# Patient Record
Sex: FEMALE | Race: WHITE | Employment: OTHER | URBAN - METROPOLITAN AREA
[De-identification: names, ages, dates, MRNs, and addresses within clinical notes are randomized per-mention and may not be internally consistent; named-entity substitution may affect disease eponyms.]

---

## 2017-03-22 ENCOUNTER — OP HISTORICAL/CONVERTED ENCOUNTER (OUTPATIENT)
Dept: OTHER | Age: 67
End: 2017-03-22

## 2017-05-05 ENCOUNTER — OP HISTORICAL/CONVERTED ENCOUNTER (OUTPATIENT)
Dept: OTHER | Age: 67
End: 2017-05-05

## 2017-10-20 ENCOUNTER — OP HISTORICAL/CONVERTED ENCOUNTER (OUTPATIENT)
Dept: OTHER | Age: 67
End: 2017-10-20

## 2017-12-28 ENCOUNTER — OP HISTORICAL/CONVERTED ENCOUNTER (OUTPATIENT)
Dept: OTHER | Age: 67
End: 2017-12-28

## 2018-01-05 ENCOUNTER — OP HISTORICAL/CONVERTED ENCOUNTER (OUTPATIENT)
Dept: OTHER | Age: 68
End: 2018-01-05

## 2018-01-18 ENCOUNTER — OP HISTORICAL/CONVERTED ENCOUNTER (OUTPATIENT)
Dept: OTHER | Age: 68
End: 2018-01-18

## 2018-01-22 ENCOUNTER — OP HISTORICAL/CONVERTED ENCOUNTER (OUTPATIENT)
Dept: OTHER | Age: 68
End: 2018-01-22

## 2018-05-10 ENCOUNTER — OP HISTORICAL/CONVERTED ENCOUNTER (OUTPATIENT)
Dept: OTHER | Age: 68
End: 2018-05-10

## 2018-05-21 ENCOUNTER — OP HISTORICAL/CONVERTED ENCOUNTER (OUTPATIENT)
Dept: OTHER | Age: 68
End: 2018-05-21

## 2018-06-21 ENCOUNTER — OP HISTORICAL/CONVERTED ENCOUNTER (OUTPATIENT)
Dept: OTHER | Age: 68
End: 2018-06-21

## 2018-06-21 LAB — AMB DEXA, EXTERNAL: NORMAL

## 2018-11-12 ENCOUNTER — OP HISTORICAL/CONVERTED ENCOUNTER (OUTPATIENT)
Dept: OTHER | Age: 68
End: 2018-11-12

## 2019-03-28 ENCOUNTER — OP HISTORICAL/CONVERTED ENCOUNTER (OUTPATIENT)
Dept: OTHER | Age: 69
End: 2019-03-28

## 2019-05-13 ENCOUNTER — OP HISTORICAL/CONVERTED ENCOUNTER (OUTPATIENT)
Dept: OTHER | Age: 69
End: 2019-05-13

## 2019-05-21 LAB
A/G RATIO, EXTERNAL: 2
ALBUMIN, EXTERNAL: 4.3
ALK PHOS, EXTERNAL: 106
ANION GAP BLD CALC-SCNC: NORMAL MMOL/L
BILI DIRECT, EXTERNAL: NORMAL
BILI TOTAL, EXTERNAL: 0.3
BUN BLD-MCNC: 9 MG/DL
BUN/CREATININE RATIO, EXTERNAL: NORMAL
CALCIUM, EXTERNAL: 11.1
CALCIUM, ION, EXTERNAL: NORMAL
CHLORIDE, EXTERNAL: 99
CO2, EXTERNAL: 26
CREATININE SER, EXTERNAL: 1.06
EGFR IF AFA, EXTERNAL: 61
EGFR NOT AFA, EXTERNAL: 53
GLOBULIN, EXTERNAL: 2.2
GLUCOSE SER, EXTERNAL: 103
HBA1C MFR BLD HPLC: 6.1 %
POTASSIUM, EXTERNAL: 4.8
PROTEIN TOT, EXTERNAL: 6.5
SGOT (AST), EXTERNAL: 15
SGPT (ALT), EXTERNAL: 14
SODIUM, EXTERNAL: 141

## 2019-06-12 ENCOUNTER — OP HISTORICAL/CONVERTED ENCOUNTER (OUTPATIENT)
Dept: OTHER | Age: 69
End: 2019-06-12

## 2020-02-28 ENCOUNTER — OP HISTORICAL/CONVERTED ENCOUNTER (OUTPATIENT)
Dept: OTHER | Age: 70
End: 2020-02-28

## 2020-05-26 ENCOUNTER — OP HISTORICAL/CONVERTED ENCOUNTER (OUTPATIENT)
Dept: OTHER | Age: 70
End: 2020-05-26

## 2020-05-27 ENCOUNTER — OP HISTORICAL/CONVERTED ENCOUNTER (OUTPATIENT)
Dept: OTHER | Age: 70
End: 2020-05-27

## 2020-05-27 LAB — MAMMOGRAPHY, EXTERNAL: NORMAL

## 2020-06-23 ENCOUNTER — HOSPITAL ENCOUNTER (OUTPATIENT)
Dept: PREADMISSION TESTING | Age: 70
Discharge: HOME | End: 2020-06-23
Payer: MEDICARE

## 2020-06-23 VITALS
RESPIRATION RATE: 18 BRPM | TEMPERATURE: 98.2 F | DIASTOLIC BLOOD PRESSURE: 57 MMHG | BODY MASS INDEX: 38.95 KG/M2 | WEIGHT: 228.13 LBS | SYSTOLIC BLOOD PRESSURE: 130 MMHG | HEIGHT: 64 IN

## 2020-06-23 LAB
ABO + RH BLD: NORMAL
ANION GAP SERPL CALC-SCNC: 6 MMOL/L (ref 5–15)
APPEARANCE UR: CLEAR
ATRIAL RATE: 80 BPM
BACTERIA URNS QL MICRO: NEGATIVE /HPF
BILIRUB UR QL: NEGATIVE
BLOOD GROUP ANTIBODIES SERPL: NORMAL
BUN SERPL-MCNC: 15 MG/DL (ref 6–20)
BUN/CREAT SERPL: 13 (ref 12–20)
CALCIUM SERPL-MCNC: 10.2 MG/DL (ref 8.5–10.1)
CALCULATED P AXIS, ECG09: 49 DEGREES
CALCULATED R AXIS, ECG10: -5 DEGREES
CALCULATED T AXIS, ECG11: 32 DEGREES
CHLORIDE SERPL-SCNC: 101 MMOL/L (ref 97–108)
CO2 SERPL-SCNC: 28 MMOL/L (ref 21–32)
COLOR UR: NORMAL
CREAT SERPL-MCNC: 1.15 MG/DL (ref 0.55–1.02)
DIAGNOSIS, 93000: NORMAL
EPITH CASTS URNS QL MICRO: NORMAL /LPF
ERYTHROCYTE [DISTWIDTH] IN BLOOD BY AUTOMATED COUNT: 14.2 % (ref 11.5–14.5)
EST. AVERAGE GLUCOSE BLD GHB EST-MCNC: 134 MG/DL
GLUCOSE SERPL-MCNC: 92 MG/DL (ref 65–100)
GLUCOSE UR STRIP.AUTO-MCNC: NEGATIVE MG/DL
HBA1C MFR BLD: 6.3 % (ref 4–5.6)
HCT VFR BLD AUTO: 40.1 % (ref 35–47)
HGB BLD-MCNC: 12.9 G/DL (ref 11.5–16)
HGB UR QL STRIP: NEGATIVE
HYALINE CASTS URNS QL MICRO: NORMAL /LPF (ref 0–5)
INR PPP: 1 (ref 0.9–1.1)
KETONES UR QL STRIP.AUTO: NEGATIVE MG/DL
LEUKOCYTE ESTERASE UR QL STRIP.AUTO: NEGATIVE
MCH RBC QN AUTO: 28.8 PG (ref 26–34)
MCHC RBC AUTO-ENTMCNC: 32.2 G/DL (ref 30–36.5)
MCV RBC AUTO: 89.5 FL (ref 80–99)
NITRITE UR QL STRIP.AUTO: NEGATIVE
NRBC # BLD: 0 K/UL (ref 0–0.01)
NRBC BLD-RTO: 0 PER 100 WBC
P-R INTERVAL, ECG05: 162 MS
PH UR STRIP: 6 [PH] (ref 5–8)
PLATELET # BLD AUTO: 382 K/UL (ref 150–400)
PMV BLD AUTO: 10.3 FL (ref 8.9–12.9)
POTASSIUM SERPL-SCNC: 5.1 MMOL/L (ref 3.5–5.1)
PROT UR STRIP-MCNC: NEGATIVE MG/DL
PROTHROMBIN TIME: 10.6 SEC (ref 9–11.1)
Q-T INTERVAL, ECG07: 372 MS
QRS DURATION, ECG06: 82 MS
QTC CALCULATION (BEZET), ECG08: 429 MS
RBC # BLD AUTO: 4.48 M/UL (ref 3.8–5.2)
RBC #/AREA URNS HPF: NORMAL /HPF (ref 0–5)
SODIUM SERPL-SCNC: 135 MMOL/L (ref 136–145)
SP GR UR REFRACTOMETRY: 1 (ref 1–1.03)
SPECIMEN EXP DATE BLD: NORMAL
UA: UC IF INDICATED,UAUC: NORMAL
UROBILINOGEN UR QL STRIP.AUTO: 0.2 EU/DL (ref 0.2–1)
VENTRICULAR RATE, ECG03: 80 BPM
WBC # BLD AUTO: 10 K/UL (ref 3.6–11)
WBC URNS QL MICRO: NORMAL /HPF (ref 0–4)

## 2020-06-23 PROCEDURE — 36415 COLL VENOUS BLD VENIPUNCTURE: CPT

## 2020-06-23 PROCEDURE — 85027 COMPLETE CBC AUTOMATED: CPT

## 2020-06-23 PROCEDURE — 83036 HEMOGLOBIN GLYCOSYLATED A1C: CPT

## 2020-06-23 PROCEDURE — 86900 BLOOD TYPING SEROLOGIC ABO: CPT

## 2020-06-23 PROCEDURE — 80048 BASIC METABOLIC PNL TOTAL CA: CPT

## 2020-06-23 PROCEDURE — 81001 URINALYSIS AUTO W/SCOPE: CPT

## 2020-06-23 PROCEDURE — 85610 PROTHROMBIN TIME: CPT

## 2020-06-23 PROCEDURE — 93005 ELECTROCARDIOGRAM TRACING: CPT

## 2020-06-23 RX ORDER — TRAMADOL HYDROCHLORIDE 50 MG/1
50 TABLET ORAL
COMMUNITY
End: 2020-07-07

## 2020-06-23 RX ORDER — ZINC GLUCONATE 10 MG
250 LOZENGE ORAL
COMMUNITY
End: 2021-05-20

## 2020-06-23 RX ORDER — LEVOTHYROXINE SODIUM 100 UG/1
100 TABLET ORAL
COMMUNITY
End: 2021-05-28 | Stop reason: SDUPTHER

## 2020-06-23 RX ORDER — UMECLIDINIUM 62.5 UG/1
1 AEROSOL, POWDER ORAL DAILY
COMMUNITY
End: 2021-05-20 | Stop reason: ALTCHOICE

## 2020-06-23 RX ORDER — SITAGLIPTIN AND METFORMIN HYDROCHLORIDE 500; 50 MG/1; MG/1
1 TABLET, FILM COATED ORAL 2 TIMES DAILY WITH MEALS
COMMUNITY
End: 2020-09-24 | Stop reason: SDUPTHER

## 2020-06-23 RX ORDER — TRAZODONE HYDROCHLORIDE 100 MG/1
100 TABLET ORAL
COMMUNITY
End: 2021-04-23 | Stop reason: SDUPTHER

## 2020-06-23 RX ORDER — BUDESONIDE AND FORMOTEROL FUMARATE DIHYDRATE 160; 4.5 UG/1; UG/1
2 AEROSOL RESPIRATORY (INHALATION) 2 TIMES DAILY
COMMUNITY
End: 2021-05-20 | Stop reason: ALTCHOICE

## 2020-06-23 RX ORDER — ROSUVASTATIN CALCIUM 20 MG/1
20 TABLET, COATED ORAL
COMMUNITY
End: 2021-05-28 | Stop reason: SDUPTHER

## 2020-06-23 RX ORDER — CHOLECALCIFEROL TAB 125 MCG (5000 UNIT) 125 MCG
5000 TAB ORAL DAILY
COMMUNITY

## 2020-06-23 RX ORDER — ANASTROZOLE 1 MG/1
1 TABLET ORAL DAILY
COMMUNITY

## 2020-06-23 RX ORDER — VITAMIN E 268 MG
400 CAPSULE ORAL DAILY
COMMUNITY

## 2020-06-23 RX ORDER — PANTOPRAZOLE SODIUM 40 MG/1
40 TABLET, DELAYED RELEASE ORAL DAILY
COMMUNITY
End: 2020-08-21 | Stop reason: SDUPTHER

## 2020-06-23 RX ORDER — OXYCODONE AND ACETAMINOPHEN 5; 325 MG/1; MG/1
1 TABLET ORAL
COMMUNITY
End: 2020-07-07

## 2020-06-23 RX ORDER — MELOXICAM 15 MG/1
15 TABLET ORAL DAILY
COMMUNITY
End: 2020-08-21 | Stop reason: SDUPTHER

## 2020-06-23 RX ORDER — FUROSEMIDE 20 MG/1
20 TABLET ORAL DAILY
COMMUNITY
End: 2021-01-08 | Stop reason: SDUPTHER

## 2020-06-23 RX ORDER — VENLAFAXINE 75 MG/1
75 TABLET ORAL DAILY
COMMUNITY
End: 2022-05-18 | Stop reason: ALTCHOICE

## 2020-06-23 RX ORDER — ASPIRIN 81 MG/1
81 TABLET ORAL DAILY
COMMUNITY

## 2020-06-23 NOTE — PERIOP NOTES
PRE-OPERATIVE INSTRUCTIONS REVIEWED WITH PATIENT. GIVEN TIME TO ASK QUESTIONS     PATIENT GIVEN 2-BOTTLE OF CHG SOAP. REVIEWED   PATIENT GIVEN SSI INFECTION FAQ SHEET.   MRSA / MSSA TREATMENT INSTRUCTION SHEET        INSTRUCTIONS GIVEN AND REVIEWED ON NEW ADMISSION PROCESS AND COVID

## 2020-06-24 LAB
BACTERIA SPEC CULT: NORMAL
BACTERIA SPEC CULT: NORMAL
SERVICE CMNT-IMP: NORMAL

## 2020-06-24 NOTE — PERIOP NOTES
12/24/20 @ 11:40 am - Called Dr. Catia White (PCP office [phone]) and spoke with Raffy Figueroa. Raffy Figueroa provided fax #: [phone] to fax patient's most recent labs & EKG.  Fax complete at 11:44 am.

## 2020-07-01 PROBLEM — M75.122 NONTRAUMATIC COMPLETE TEAR OF LEFT ROTATOR CUFF: Status: ACTIVE | Noted: 2020-07-01

## 2020-07-01 NOTE — H&P
Date of Surgery Update:  Gail Montesinos was seen and examined. Past Medical History:   Diagnosis Date    Arthritis     Asthma     Cancer ([facility] [address]     BREAST CANCER RIGHT BREAST LUMPECTOMY     Chronic pain     COPD (chronic obstructive pulmonary disease) (HCC)     Diabetes (HCC)     GERD (gastroesophageal reflux disease)     Sleep apnea     Thyroid disease 1980    REMOVED      Prior to Admission Medications   Prescriptions Last Dose Informant Patient Reported? Taking? Lactobacillus rhamnosus GG (CULTURELLE PO)   Yes No   Sig: Take 1 Tab by mouth daily. SITagliptin-metFORMIN (Janumet)  mg per tablet   Yes No   Sig: Take 1 Tab by mouth two (2) times daily (with meals). anastrozole (ARIMIDEX) 1 mg tablet 7/6/2020 at Unknown time  Yes Yes   Sig: Take 1 mg by mouth daily. aspirin delayed-release 81 mg tablet 6/30/2020  Yes No   Sig: Take 81 mg by mouth daily. budesonide-formoteroL (Symbicort) 160-4.5 mcg/actuation HFAA 7/6/2020 at Unknown time  Yes Yes   Sig: Take 2 Puffs by inhalation two (2) times a day. cholecalciferol (Vitamin D3) (5000 Units/125 mcg) tab tablet 6/7/2020 at Unknown time  Yes Yes   Sig: Take 5,000 Units by mouth daily. furosemide (LASIX) 20 mg tablet 7/6/2020 at Unknown time  Yes Yes   Sig: Take 20 mg by mouth daily. levothyroxine (SYNTHROID) 100 mcg tablet 7/6/2020 at Unknown time  Yes Yes   Sig: Take 100 mcg by mouth Daily (before breakfast). magnesium 250 mg tab 6/30/2020  Yes No   Sig: Take 250 mg by mouth.   meloxicam (MOBIC) 15 mg tablet 6/30/2020  Yes No   Sig: Take 15 mg by mouth daily. omega-3 fatty acids/dha/epa (MEGARED PLANT-OMEGA-3 PO) 6/30/2020  Yes No   Sig: Take 800 mg by mouth daily. oxyCODONE-acetaminophen (PERCOCET) 5-325 mg per tablet 6/30/2020 at Unknown time  Yes Yes   Sig: Take 1 Tab by mouth every four (4) hours as needed for Pain.   pantoprazole (PROTONIX) 40 mg tablet 7/6/2020 at Unknown time  Yes Yes   Sig: Take 40 mg by mouth daily. rosuvastatin (CRESTOR) 20 mg tablet 7/6/2020 at Unknown time  Yes Yes   Sig: Take 20 mg by mouth nightly. traMADoL (ULTRAM) 50 mg tablet 7/6/2020 at Unknown time  Yes Yes   Sig: Take 50 mg by mouth every six (6) hours as needed for Pain. traZODone (DESYREL) 100 mg tablet 6/30/2020 at Unknown time  Yes Yes   Sig: Take 100 mg by mouth nightly. umeclidinium (Incruse Ellipta) 62.5 mcg/actuation inhaler 7/6/2020 at Unknown time  Yes Yes   Sig: Take 1 Puff by inhalation daily. venlafaxine (EFFEXOR) 75 mg tablet Unknown at Unknown time  Yes No   Sig: Take 75 mg by mouth daily. vitamin E (AQUA GEMS) 400 unit capsule 6/7/2020 at Unknown time  Yes Yes   Sig: Take 400 Units by mouth daily. Facility-Administered Medications: None      Allergy to: Other medication  Physical Examination: General appearance - alert, well appearing, and in no distress  Chest - clear to auscultation, no wheezes, rales or rhonchi, symmetric air entry  Heart - normal rate and regular rhythm  Abdomen - soft, nontender, nondistended, no masses or organomegaly  History and physical has been reviewed. The patient has been examined. There have been no significant clinical changes since the completion of the originally dated History and Physical.    Signed By: Katherine Howard PA-C     July 7, 2020 9:01 AM             PCP: Brian Lott, DO  There is no problem list on file for this patient. Subjective:   Chief Complaint: Pain of the Left Shoulder and Pain of the Right Shoulder    HPI: Chacha Chan is a 68 y.o. female who presents today for evaluation and treatment of her left shoulder pain. She reports lateral left shoulder pain that radiates into her left elbow. She reports a history of metastatic disease. She notes she is currently taking medication for her endocrine function which dramatically increased her shoulder pain. She notes the pain now radiates across her neck and into her right shoulder.  She reports that the pain keeps her awake at night. She says she has tried Tramadol with no significant relief and oxycodone when her pain is severe with relief. She notes she had an MRI ordered which showed osteoarthritis of her left shoulder. She denies the MRI showed a full-thickness rotator cuff tear. She denies trying steroid injections to her shoulder in the past.      Objective:      There were no vitals filed for this visit. There is no height or weight on file to calculate BMI.     Constitutional:  No acute distress. Well nourished. Well developed. Eyes:  Sclera are nonicteric. Respiratory:  No labored breathing. Cardiovascular:  No marked edema. Skin:  No marked skin ulcers. Neurological:  No marked sensory loss noted. Psychiatric: Alert and oriented x3.     Musculoskeletal : Left Shoulder: She has good range of motion of the neck. She has good passive ROM with significant pain on motion particularly past 90 degrees with forward flexion and abduction. She has good active ROM with significant pain on motion particularly past 90 degrees with forward flexion and abduction. She has no weakness with rotator cuff strength testing. She has good internal and external rotation. She has a positive impingement sign. She has a negative Spurling sign. Skin healthy and in tact. Neurovascularly in tact.          Radiographs:     Order: XR SHOULDER 2+ VW LEFT - Indication: Rotator cuff impingement   syndrome of left shoulder        X-ray Shoulder Left 2+ Views     Result Date: 6/5/2020  AP, Outlet, Gras[place].      Impression: 3-view x-rays of the left shoulder shows no significant degenerative changes of the glenohumeral joint. No high riding humeral head observed. Assessment:      1. Nontraumatic complete tear of left rotator cuff    2. Rotator cuff impingement syndrome of left shoulder    3. Primary osteoarthritis of left shoulder      Plan:      I reviewed the MRI ordered of the left shoulder with the patient.  She has moderate glenohumeral joint osteoarthritis and a 50-75% partial thickness tear of the supraspinatus. I reviewed x-rays ordered of the left shoulder with the patient today. She has no significant degenerative changes of the glenohumeral joint. I discussed the diagnosis of a near full-thickness rotator cuff tear with the patient as well as treatment options. We discussed rotator cuff repair surgery vs continued conservative management as well as the pros and cons of each. I explained to the patient there is a high likelihood her tear will progress to a full-thickness rotator cuff tear. While there is not absolute indication for surgery at this point in time, I recommend she proceed with surgery based on this. We discussed rotator cuff repair surgery at length including post-op recovery and expected outcomes. We also discussed risks and complications of the procedure including failure of the repair, DVT, PE, and infection. After a lengthy discussion, the patient wishes to proceed with surgery. We will schedule surgery at her convenience. Follow-up for scheduled surgery.         Orders Placed This Encounter    X-ray shoulder left 2+ views      Return for Scheduled surgery.      Medical documentation was entered by Woo santana, Medical Scribe for Dr. Harrison Form. 6/5/2020  10:41 AM  IRoderick MD, personally, performed the services described in this documentation, as scribed in my presence, and is accurate and complete.

## 2020-07-03 ENCOUNTER — HOSPITAL ENCOUNTER (OUTPATIENT)
Dept: PREADMISSION TESTING | Age: 70
Discharge: HOME | End: 2020-07-03
Payer: MEDICARE

## 2020-07-03 DIAGNOSIS — Z20.822 ENCOUNTER FOR LABORATORY TESTING FOR COVID-19 VIRUS: ICD-10-CM

## 2020-07-03 PROCEDURE — 87635 SARS-COV-2 COVID-19 AMP PRB: CPT

## 2020-07-04 LAB — SARS-COV-2, COV2NT: NOT DETECTED

## 2020-07-06 ENCOUNTER — ANESTHESIA EVENT (OUTPATIENT)
Dept: MEDSURG UNIT | Age: 70
End: 2020-07-06
Payer: MEDICARE

## 2020-07-07 ENCOUNTER — ANESTHESIA (OUTPATIENT)
Dept: MEDSURG UNIT | Age: 70
End: 2020-07-07
Payer: MEDICARE

## 2020-07-07 ENCOUNTER — HOSPITAL ENCOUNTER (OUTPATIENT)
Age: 70
Setting detail: OUTPATIENT SURGERY
Discharge: HOME | End: 2020-07-07
Attending: ORTHOPAEDIC SURGERY | Admitting: ORTHOPAEDIC SURGERY
Payer: MEDICARE

## 2020-07-07 VITALS
WEIGHT: 227.07 LBS | TEMPERATURE: 98.4 F | HEART RATE: 93 BPM | OXYGEN SATURATION: 98 % | RESPIRATION RATE: 19 BRPM | DIASTOLIC BLOOD PRESSURE: 72 MMHG | BODY MASS INDEX: 38.98 KG/M2 | SYSTOLIC BLOOD PRESSURE: 124 MMHG

## 2020-07-07 DIAGNOSIS — M75.122 NONTRAUMATIC COMPLETE TEAR OF LEFT ROTATOR CUFF: Primary | ICD-10-CM

## 2020-07-07 LAB
GLUCOSE BLD STRIP.AUTO-MCNC: 131 MG/DL (ref 65–100)
GLUCOSE BLD STRIP.AUTO-MCNC: 152 MG/DL (ref 65–100)
SERVICE CMNT-IMP: ABNORMAL
SERVICE CMNT-IMP: ABNORMAL

## 2020-07-07 PROCEDURE — 77030002922 HC SUT FBRWRE ARTH -B: Performed by: ORTHOPAEDIC SURGERY

## 2020-07-07 PROCEDURE — C1713 ANCHOR/SCREW BN/BN,TIS/BN: HCPCS | Performed by: ORTHOPAEDIC SURGERY

## 2020-07-07 PROCEDURE — 77030019908 HC STETH ESOPH SIMS -A: Performed by: ANESTHESIOLOGY

## 2020-07-07 PROCEDURE — 74011250636 HC RX REV CODE- 250/636: Performed by: NURSE ANESTHETIST, CERTIFIED REGISTERED

## 2020-07-07 PROCEDURE — 82962 GLUCOSE BLOOD TEST: CPT

## 2020-07-07 PROCEDURE — 74011250636 HC RX REV CODE- 250/636: Performed by: PHYSICIAN ASSISTANT

## 2020-07-07 PROCEDURE — 77030002919 HC SUT FBRTAPE ARTH -B: Performed by: ORTHOPAEDIC SURGERY

## 2020-07-07 PROCEDURE — 77030031139 HC SUT VCRL2 J&J -A: Performed by: ORTHOPAEDIC SURGERY

## 2020-07-07 PROCEDURE — 77030026438 HC STYL ET INTUB CARD -A: Performed by: ANESTHESIOLOGY

## 2020-07-07 PROCEDURE — 77030018835 HC SOL IRR LR ICUM -A: Performed by: ORTHOPAEDIC SURGERY

## 2020-07-07 PROCEDURE — 76210000036 HC AMBSU PH I REC 1.5 TO 2 HR: Performed by: ORTHOPAEDIC SURGERY

## 2020-07-07 PROCEDURE — 77030016678 HC BUR SHV4 S&N -B: Performed by: ORTHOPAEDIC SURGERY

## 2020-07-07 PROCEDURE — 76030000003 HC AMB SURG OR TIME 1.5 TO 2: Performed by: ORTHOPAEDIC SURGERY

## 2020-07-07 PROCEDURE — 74011250636 HC RX REV CODE- 250/636: Performed by: ORTHOPAEDIC SURGERY

## 2020-07-07 PROCEDURE — 77030006884 HC BLD SHV INCIS S&N -B: Performed by: ORTHOPAEDIC SURGERY

## 2020-07-07 PROCEDURE — 77030010507 HC ADH SKN DERMBND J&J -B: Performed by: ORTHOPAEDIC SURGERY

## 2020-07-07 PROCEDURE — 74011250636 HC RX REV CODE- 250/636: Performed by: ANESTHESIOLOGY

## 2020-07-07 PROCEDURE — 77030002916 HC SUT ETHLN J&J -A: Performed by: ORTHOPAEDIC SURGERY

## 2020-07-07 PROCEDURE — 76060000063 HC AMB SURG ANES 1.5 TO 2 HR: Performed by: ORTHOPAEDIC SURGERY

## 2020-07-07 PROCEDURE — 77030002933 HC SUT MCRYL J&J -A: Performed by: ORTHOPAEDIC SURGERY

## 2020-07-07 PROCEDURE — 74011250637 HC RX REV CODE- 250/637: Performed by: NURSE ANESTHETIST, CERTIFIED REGISTERED

## 2020-07-07 PROCEDURE — 77030020023 HC PRB ARTH ABLAT S&N -C: Performed by: ORTHOPAEDIC SURGERY

## 2020-07-07 PROCEDURE — 77030040361 HC SLV COMPR DVT MDII -B: Performed by: ORTHOPAEDIC SURGERY

## 2020-07-07 PROCEDURE — 77030008753 HC TU IRR IN/OUT FLO S&N -B: Performed by: ORTHOPAEDIC SURGERY

## 2020-07-07 PROCEDURE — 77030008684 HC TU ET CUF COVD -B: Performed by: ANESTHESIOLOGY

## 2020-07-07 PROCEDURE — 74011000250 HC RX REV CODE- 250: Performed by: NURSE ANESTHETIST, CERTIFIED REGISTERED

## 2020-07-07 DEVICE — BIO-COMP SWVLK C, CLD 4.75X19.1MM
Type: IMPLANTABLE DEVICE | Site: SHOULDER | Status: FUNCTIONAL
Brand: ARTHREX®

## 2020-07-07 RX ORDER — MIDAZOLAM HYDROCHLORIDE 1 MG/ML
1 INJECTION, SOLUTION INTRAMUSCULAR; INTRAVENOUS AS NEEDED
Status: DISCONTINUED | OUTPATIENT
Start: 2020-07-07 | End: 2020-07-07 | Stop reason: HOSPADM

## 2020-07-07 RX ORDER — SODIUM CHLORIDE, SODIUM LACTATE, POTASSIUM CHLORIDE, CALCIUM CHLORIDE 600; 310; 30; 20 MG/100ML; MG/100ML; MG/100ML; MG/100ML
100 INJECTION, SOLUTION INTRAVENOUS CONTINUOUS
Status: DISCONTINUED | OUTPATIENT
Start: 2020-07-07 | End: 2020-07-07 | Stop reason: HOSPADM

## 2020-07-07 RX ORDER — SODIUM CHLORIDE 0.9 % (FLUSH) 0.9 %
5-40 SYRINGE (ML) INJECTION AS NEEDED
Status: DISCONTINUED | OUTPATIENT
Start: 2020-07-07 | End: 2020-07-07 | Stop reason: HOSPADM

## 2020-07-07 RX ORDER — CEFAZOLIN SODIUM/WATER 2 G/20 ML
2 SYRINGE (ML) INTRAVENOUS ONCE
Status: COMPLETED | OUTPATIENT
Start: 2020-07-07 | End: 2020-07-07

## 2020-07-07 RX ORDER — SODIUM CHLORIDE 0.9 % (FLUSH) 0.9 %
5-40 SYRINGE (ML) INJECTION EVERY 8 HOURS
Status: DISCONTINUED | OUTPATIENT
Start: 2020-07-07 | End: 2020-07-07 | Stop reason: HOSPADM

## 2020-07-07 RX ORDER — OXYCODONE HYDROCHLORIDE 5 MG/1
5 TABLET ORAL
Qty: 40 TAB | Refills: 0 | Status: SHIPPED | OUTPATIENT
Start: 2020-07-07 | End: 2020-07-14

## 2020-07-07 RX ORDER — DEXMEDETOMIDINE HYDROCHLORIDE 100 UG/ML
INJECTION, SOLUTION INTRAVENOUS AS NEEDED
Status: DISCONTINUED | OUTPATIENT
Start: 2020-07-07 | End: 2020-07-07 | Stop reason: HOSPADM

## 2020-07-07 RX ORDER — PHENYLEPHRINE HCL IN 0.9% NACL 0.4MG/10ML
SYRINGE (ML) INTRAVENOUS AS NEEDED
Status: DISCONTINUED | OUTPATIENT
Start: 2020-07-07 | End: 2020-07-07 | Stop reason: HOSPADM

## 2020-07-07 RX ORDER — ONDANSETRON 2 MG/ML
INJECTION INTRAMUSCULAR; INTRAVENOUS AS NEEDED
Status: DISCONTINUED | OUTPATIENT
Start: 2020-07-07 | End: 2020-07-07 | Stop reason: HOSPADM

## 2020-07-07 RX ORDER — LIDOCAINE HYDROCHLORIDE 10 MG/ML
0.1 INJECTION, SOLUTION EPIDURAL; INFILTRATION; INTRACAUDAL; PERINEURAL AS NEEDED
Status: DISCONTINUED | OUTPATIENT
Start: 2020-07-07 | End: 2020-07-07 | Stop reason: HOSPADM

## 2020-07-07 RX ORDER — SUCCINYLCHOLINE CHLORIDE 20 MG/ML
INJECTION INTRAMUSCULAR; INTRAVENOUS AS NEEDED
Status: DISCONTINUED | OUTPATIENT
Start: 2020-07-07 | End: 2020-07-07 | Stop reason: HOSPADM

## 2020-07-07 RX ORDER — GLYCOPYRROLATE 0.2 MG/ML
INJECTION INTRAMUSCULAR; INTRAVENOUS AS NEEDED
Status: DISCONTINUED | OUTPATIENT
Start: 2020-07-07 | End: 2020-07-07 | Stop reason: HOSPADM

## 2020-07-07 RX ORDER — ROPIVACAINE HYDROCHLORIDE 5 MG/ML
INJECTION, SOLUTION EPIDURAL; INFILTRATION; PERINEURAL
Status: COMPLETED | OUTPATIENT
Start: 2020-07-07 | End: 2020-07-07

## 2020-07-07 RX ORDER — ROPIVACAINE HYDROCHLORIDE 5 MG/ML
150 INJECTION, SOLUTION EPIDURAL; INFILTRATION; PERINEURAL AS NEEDED
Status: DISCONTINUED | OUTPATIENT
Start: 2020-07-07 | End: 2020-07-07 | Stop reason: HOSPADM

## 2020-07-07 RX ORDER — HYDROMORPHONE HYDROCHLORIDE 1 MG/ML
0.2 INJECTION, SOLUTION INTRAMUSCULAR; INTRAVENOUS; SUBCUTANEOUS
Status: DISCONTINUED | OUTPATIENT
Start: 2020-07-07 | End: 2020-07-07 | Stop reason: HOSPADM

## 2020-07-07 RX ORDER — SODIUM CHLORIDE, SODIUM LACTATE, POTASSIUM CHLORIDE, CALCIUM CHLORIDE 600; 310; 30; 20 MG/100ML; MG/100ML; MG/100ML; MG/100ML
INJECTION, SOLUTION INTRAVENOUS
Status: DISCONTINUED | OUTPATIENT
Start: 2020-07-07 | End: 2020-07-07 | Stop reason: HOSPADM

## 2020-07-07 RX ORDER — ALBUTEROL SULFATE 90 UG/1
AEROSOL, METERED RESPIRATORY (INHALATION) AS NEEDED
Status: DISCONTINUED | OUTPATIENT
Start: 2020-07-07 | End: 2020-07-07 | Stop reason: HOSPADM

## 2020-07-07 RX ORDER — MIDAZOLAM HYDROCHLORIDE 1 MG/ML
0.5 INJECTION, SOLUTION INTRAMUSCULAR; INTRAVENOUS
Status: DISCONTINUED | OUTPATIENT
Start: 2020-07-07 | End: 2020-07-07 | Stop reason: HOSPADM

## 2020-07-07 RX ORDER — FENTANYL CITRATE 50 UG/ML
25 INJECTION, SOLUTION INTRAMUSCULAR; INTRAVENOUS
Status: DISCONTINUED | OUTPATIENT
Start: 2020-07-07 | End: 2020-07-07 | Stop reason: HOSPADM

## 2020-07-07 RX ORDER — PROPOFOL 10 MG/ML
INJECTION, EMULSION INTRAVENOUS AS NEEDED
Status: DISCONTINUED | OUTPATIENT
Start: 2020-07-07 | End: 2020-07-07 | Stop reason: HOSPADM

## 2020-07-07 RX ORDER — DIPHENHYDRAMINE HYDROCHLORIDE 50 MG/ML
12.5 INJECTION, SOLUTION INTRAMUSCULAR; INTRAVENOUS AS NEEDED
Status: DISCONTINUED | OUTPATIENT
Start: 2020-07-07 | End: 2020-07-07 | Stop reason: HOSPADM

## 2020-07-07 RX ORDER — LIDOCAINE HYDROCHLORIDE 20 MG/ML
INJECTION, SOLUTION EPIDURAL; INFILTRATION; INTRACAUDAL; PERINEURAL AS NEEDED
Status: DISCONTINUED | OUTPATIENT
Start: 2020-07-07 | End: 2020-07-07 | Stop reason: HOSPADM

## 2020-07-07 RX ORDER — DEXAMETHASONE SODIUM PHOSPHATE 4 MG/ML
INJECTION, SOLUTION INTRA-ARTICULAR; INTRALESIONAL; INTRAMUSCULAR; INTRAVENOUS; SOFT TISSUE AS NEEDED
Status: DISCONTINUED | OUTPATIENT
Start: 2020-07-07 | End: 2020-07-07 | Stop reason: HOSPADM

## 2020-07-07 RX ORDER — FENTANYL CITRATE 50 UG/ML
50 INJECTION, SOLUTION INTRAMUSCULAR; INTRAVENOUS AS NEEDED
Status: DISCONTINUED | OUTPATIENT
Start: 2020-07-07 | End: 2020-07-07 | Stop reason: HOSPADM

## 2020-07-07 RX ORDER — ROCURONIUM BROMIDE 10 MG/ML
INJECTION, SOLUTION INTRAVENOUS AS NEEDED
Status: DISCONTINUED | OUTPATIENT
Start: 2020-07-07 | End: 2020-07-07 | Stop reason: HOSPADM

## 2020-07-07 RX ORDER — FENTANYL CITRATE 50 UG/ML
INJECTION, SOLUTION INTRAMUSCULAR; INTRAVENOUS AS NEEDED
Status: DISCONTINUED | OUTPATIENT
Start: 2020-07-07 | End: 2020-07-07 | Stop reason: HOSPADM

## 2020-07-07 RX ADMIN — FENTANYL CITRATE 25 MCG: 50 INJECTION, SOLUTION INTRAMUSCULAR; INTRAVENOUS at 11:25

## 2020-07-07 RX ADMIN — ALBUTEROL SULFATE 3 PUFF: 90 AEROSOL, METERED RESPIRATORY (INHALATION) at 11:44

## 2020-07-07 RX ADMIN — FENTANYL CITRATE 25 MCG: 50 INJECTION, SOLUTION INTRAMUSCULAR; INTRAVENOUS at 10:47

## 2020-07-07 RX ADMIN — ROPIVACAINE HYDROCHLORIDE 30 ML: 5 INJECTION, SOLUTION EPIDURAL; INFILTRATION; PERINEURAL at 10:22

## 2020-07-07 RX ADMIN — ROCURONIUM BROMIDE 10 MG: 10 SOLUTION INTRAVENOUS at 09:59

## 2020-07-07 RX ADMIN — PHENYLEPHRINE HYDROCHLORIDE 50 MCG/MIN: 10 INJECTION INTRAVENOUS at 10:14

## 2020-07-07 RX ADMIN — GLYCOPYRROLATE 0.2 MG: 0.2 INJECTION, SOLUTION INTRAMUSCULAR; INTRAVENOUS at 10:34

## 2020-07-07 RX ADMIN — SODIUM CHLORIDE, POTASSIUM CHLORIDE, SODIUM LACTATE AND CALCIUM CHLORIDE: 600; 310; 30; 20 INJECTION, SOLUTION INTRAVENOUS at 09:45

## 2020-07-07 RX ADMIN — LIDOCAINE HYDROCHLORIDE 100 MG: 20 INJECTION, SOLUTION EPIDURAL; INFILTRATION; INTRACAUDAL; PERINEURAL at 09:59

## 2020-07-07 RX ADMIN — Medication 80 MCG: at 10:59

## 2020-07-07 RX ADMIN — DEXAMETHASONE SODIUM PHOSPHATE 4 MG: 4 INJECTION, SOLUTION INTRAMUSCULAR; INTRAVENOUS at 10:04

## 2020-07-07 RX ADMIN — MIDAZOLAM 1.5 MG: 1 INJECTION INTRAMUSCULAR; INTRAVENOUS at 09:26

## 2020-07-07 RX ADMIN — DEXMEDETOMIDINE HYDROCHLORIDE 15 MCG: 100 INJECTION, SOLUTION, CONCENTRATE INTRAVENOUS at 09:59

## 2020-07-07 RX ADMIN — PROPOFOL 100 MG: 10 INJECTION, EMULSION INTRAVENOUS at 09:59

## 2020-07-07 RX ADMIN — Medication 80 MCG: at 11:02

## 2020-07-07 RX ADMIN — ONDANSETRON HYDROCHLORIDE 4 MG: 2 INJECTION, SOLUTION INTRAMUSCULAR; INTRAVENOUS at 10:04

## 2020-07-07 RX ADMIN — PROPOFOL 50 MG: 10 INJECTION, EMULSION INTRAVENOUS at 10:00

## 2020-07-07 RX ADMIN — ROCURONIUM BROMIDE 10 MG: 10 SOLUTION INTRAVENOUS at 10:00

## 2020-07-07 RX ADMIN — ROPIVACAINE HYDROCHLORIDE 150 MG: 5 INJECTION, SOLUTION EPIDURAL; INFILTRATION; PERINEURAL at 09:27

## 2020-07-07 RX ADMIN — SUCCINYLCHOLINE CHLORIDE 160 MG: 20 INJECTION, SOLUTION INTRAMUSCULAR; INTRAVENOUS at 09:59

## 2020-07-07 RX ADMIN — Medication 2 G: at 10:07

## 2020-07-07 NOTE — ANESTHESIA POSTPROCEDURE EVALUATION
Procedure(s):  LEFT SHOULDER ARTHROSCOPIC ACROMIOPLASTY, BICEPS TENOTOMY, MINI OPEN ROTATOR CUFF TEAR REPAIR. general    Anesthesia Post Evaluation      Multimodal analgesia: multimodal analgesia used between 6 hours prior to anesthesia start to PACU discharge  Patient location during evaluation: bedside  Patient participation: waiting for patient participation  Level of consciousness: awake  Pain management: adequate  Airway patency: patent  Anesthetic complications: no  Cardiovascular status: acceptable  Respiratory status: unassisted  Hydration status: acceptable  Comments: Post-Anesthesia Evaluation and Assessment    I have evaluated the patient and they are ready for PACU discharge. Patient: Desire Choi MRN: [id number]  SSN: [id number]   YOB: 1950  Age: 68 y.o. Sex: female      Cardiovascular Function/Vital Signs  /62   Pulse 88   Temp 36.9 °C (98.4 °F)   Resp 14   Wt 103 kg (227 lb 1.2 oz)   SpO2 95%   BMI 38.98 kg/m²     Patient is status post General anesthesia for Procedure(s):  LEFT SHOULDER ARTHROSCOPIC ACROMIOPLASTY, BICEPS TENOTOMY, MINI OPEN ROTATOR CUFF TEAR REPAIR. Nausea/Vomiting: None    Postoperative hydration reviewed and adequate. Pain:  Pain Scale 1: Numeric (0 - 10) (07/07/20 1200)  Pain Intensity 1: 0 (07/07/20 1200)   Managed    Neurological Status:   Neuro (WDL): Within Defined Limits (07/07/20 0858)   At baseline    Mental Status, Level of Consciousness: Alert and  oriented to person, place, and time    Pulmonary Status:   O2 Device: 02 face tent (07/07/20 1210)   Adequate oxygenation and airway patent    Complications related to anesthesia: None    Post-anesthesia assessment completed.  No concerns    Signed By: Jono Luque MD    July 7, 2020                   INITIAL Post-op Vital signs:   Vitals Value Taken Time   /56 7/7/2020 12:30 PM   Temp 36.9 °C (98.4 °F) 7/7/2020 11:54 AM   Pulse 93 7/7/2020 12:40 PM   Resp 21 7/7/2020 12:40 PM SpO2 91 % 7/7/2020 12:40 PM   Vitals shown include unvalidated device data.

## 2020-07-07 NOTE — PERIOP NOTES
MD Dominga Reynaga at bedside. Per MD put patient on bipap/cpap for 2 hours prior to discharging patient.

## 2020-07-07 NOTE — PERIOP NOTES
Discharge instructions reviewed with patient's  via telephone. Opportunity for questions and clarification provided. Patient's  verbalized understanding of discharge instructions and had no additional questions or concerns. Patient's recovery status provided.

## 2020-07-07 NOTE — ANESTHESIA PROCEDURE NOTES
Peripheral Block    Performed by: Chloé Garcia MD  Authorized by: Chloé Garcia MD       Pre-procedure: Indications: at surgeon's request and post-op pain management    Preanesthetic Checklist: patient identified, risks and benefits discussed, site marked, timeout performed, anesthesia consent given and patient being monitored      Block Type:   Block Type:   Interscalene  Laterality:  Left  Monitoring:  Standard ASA monitoring, continuous pulse ox, frequent vital sign checks, heart rate, responsive to questions and oxygen  Injection Technique:  Single shot  Procedures: ultrasound guided    Patient Position: supine  Prep: chlorhexidine    Location:  Interscalene  Needle Type:  Stimuplex  Needle Gauge:  22 G  Needle Localization:  Ultrasound guidance    Assessment:  Number of attempts:  1  Injection Assessment:  Incremental injection every 5 mL, local visualized surrounding nerve on ultrasound, negative aspiration for blood, no paresthesia, negative aspiration for CSF and ultrasound image on chart  Patient tolerance:  Patient tolerated the procedure well with no immediate complications

## 2020-07-07 NOTE — ANESTHESIA PREPROCEDURE EVALUATION
Relevant Problems   No relevant active problems       Anesthetic History   No history of anesthetic complications            Review of Systems / Medical History  Patient summary reviewed, nursing notes reviewed and pertinent labs reviewed    Pulmonary    COPD    Sleep apnea    Asthma        Neuro/Psych   Within defined limits           Cardiovascular  Within defined limits                     GI/Hepatic/Renal     GERD           Endo/Other    Diabetes  Hypothyroidism  Arthritis     Other Findings              Physical Exam    Airway  Mallampati: II  TM Distance: > 6 cm  Neck ROM: normal range of motion   Mouth opening: Normal     Cardiovascular  Regular rate and rhythm,  S1 and S2 normal,  no murmur, click, rub, or gallop             Dental  No notable dental hx       Pulmonary  Breath sounds clear to auscultation               Abdominal  GI exam deferred       Other Findings            Anesthetic Plan    ASA: 3  Anesthesia type: general      Post-op pain plan if not by surgeon: peripheral nerve block single    Induction: Intravenous  Anesthetic plan and risks discussed with: Patient

## 2020-07-07 NOTE — ROUTINE PROCESS
Patient: Jose M More MRN: [id number]  SSN: [id number]   YOB: 1950  Age: 68 y.o. Sex: female     Patient is status post Procedure(s):  LEFT SHOULDER ARTHROSCOPIC ACROMIOPLASTY, BICEPS TENOTOMY, MINI OPEN ROTATOR CUFF TEAR REPAIR. Surgeon(s) and Role:     * Ze Chowdhury MD - Primary    Local/Dose/Irrigation:  SEE MAR                  Peripheral IV 07/07/20 Right Wrist (Active)   Site Assessment Clean, dry, & intact 7/7/2020  8:56 AM   Phlebitis Assessment 0 7/7/2020  8:56 AM   Dressing Status Clean, dry, & intact 7/7/2020  8:56 AM   Dressing Type Transparent 7/7/2020  8:56 AM   Hub Color/Line Status Pink; Infusing 7/7/2020  8:56 AM            Airway - Endotracheal Tube 07/07/20 Oral (Active)                   Dressing/Packing:  Wound Shoulder Left-Dressing Type: 4 x 4;ABD pad;Special tape (comment)(hypafix tape) (07/07/20 1025)    Splint/Cast: Wound Shoulder Left-Splint Type/Material: Shoulder Immobilizer]    Other:

## 2020-07-07 NOTE — BRIEF OP NOTE
Brief Postoperative Note    Patient: Karli Mckeon  YOB: 1950  MRN: [id number]    Date of Procedure: 7/7/2020     Pre-Op Diagnosis: ROTATOR CUFF TEAR, BICEPS TENDONITIS    Post-Op Diagnosis: Same as preoperative diagnosis. Procedure(s):  LEFT SHOULDER ARTHROSCOPIC ACROMIOPLASTY, BICEPS TENOTOMY, MINI OPEN ROTATOR CUFF TEAR REPAIR    Surgeon(s):  Liz Florence MD    Surgical Assistant: Physician Assistant: Evin Lui PA-C    Anesthesia: General     Estimated Blood Loss (mL): Minimal    Complications: None    Specimens: * No specimens in log *     Implants:   Implant Name Type Inv.  Item Serial No.  Lot No. LRB No. Used Action   ANCHOR C SWIVELOCK 4.75MM - SNA  ANCHOR C SWIVELOCK 4.75MM NA ARTHREX [id number] Left 1 Implanted   ANCHOR C SWIVELOCK 4.75MM - SNA  ANCHOR C SWIVELOCK 4.75MM NA ARTHREX [id number] Left 3 Implanted       Drains: * No LDAs found *    Findings: full thickness tear of supraspinatous, biceps tendonitis    Electronically Signed by Nyasia De La Cruz MD on 7/7/2020 at 11:26 AM

## 2020-07-07 NOTE — PERIOP NOTES
MD Hugo Hurtado at bedside. Patient alert and oriented, O2 sats 94% on O2 face tent 50%, breath sounds clear bilaterally.  Per MD ok to cancel bipap

## 2020-07-07 NOTE — DISCHARGE INSTRUCTIONS
[facility]    Upper Extremity Surgery  Discharge Instructions  JUNE Mckinney MD    Please take the time to review the following instructions before you leave the hospital/surgery center and use them as guidelines during your recovery from surgery. If you have any questions, you may contact my office at [phone]. Wound Care/Dressing Changes    __X___ You may change your dressing as needed beginning on the 3rd day after surgery (Friday 7/10/2020). When the dressing is removed, you may wash the area, including the sutures or staples, with soap and water. If the wound is still draining at that point, you may cover the incision with gauze and an ACE wrap. It is not necessary to apply antibiotic ointment to the incision. Sutures or staples will be removed at Dr. Donny Ferro office during your post-operative visit.    _____ Do not remove your dressings or get them wet. Showering/Bathing    __X___ Nu Slider may shower only. Your dressing may be removed for showering. You may get your incisions wet in the shower. Do not vigorously scrub the area where your incisions are. Apply a clean, dry dressing after gently drying the area of your incisions. Don't take a tub bath, get into a swimming pool or Jacuzzi until the incisions are completely healed. That is, don't soak your incisions under water.    _____ Don't get your incisions wet until the staples are removed. After the staples are removed, you may shower as instructed above.    _____ Don't remove your dressings or get them wet until you are seen at your follow-up appointment with Dr. Harley Matias. You will be given further instructions at that time. Exercises:    _____ Nu Slider may remove your sling as tolerated. Wear the sling as needed for comfort. There are no restrictions as far as movement of your joints is concerned. You may begin moving your joints as tolerated through normal range of motion.     __X___ Keep your arm in the sling at all times except when showering, changing your clothes, or performing your pendulum exercises. Keep your arm at your side. Don't move it away from your body. __X___ Perform the pendulum exercises you see below. Begin these exercises today. Perform twice daily for 5 minutes. Ice/Elevation:    __X___ Continue ice consistently for the next 48 hours as needed for pain and swelling. You should ice your shoulder at least three times each day for one week in cycles of ice on for twenty minutes, ice off for twenty minutes. Repeat for a total of two hours each time. Diet:    __X___ Ania Stagers may advance to your regular diet as tolerated. Increase your clear liquid intake for the next 2 to 3 days. Medication:    __X___ Ania Stagers will be given a prescription pain medicine when you are discharged from the hospital/surgery center. Take the medication on an as-needed basis according to the directions on the prescription bottle. Possible side effects of this medication include dizziness, headache, nausea, vomitting, constipation, and urinary retention. If you experience any of these side effects, call the office so that we may assist you in relieving them. Stop the use of pain medications if you develop excessive itching, a rash, shortness of breath, or difficulty swallowing. If these symptoms are severe or are not relieved by stopping the medication, you should seek immediate medical attention. Refills of pain medication are authorized during office hours ONLY (8am-5pm, M-F). __X___ Ania Stagers may resume the medications you were taking prior to surgery. Pain medication may potentiate the effects of any anti-depressant medication you are taking. If you have any questions about possible interactions between your regular medications and the pain medication, you should consult your medical doctor who prescribes your regular medications.     __X___ Ania Stagers may use Mobic daily after surgery. This can be used along with your pain medication. **YOUR PAIN MEDICATION HAS BEEN ELECTRONICALLY SENT TO YOUR PHARMACY ON FILE**      Follow-up with Dr. Facundo Grayson in _TWO WEEKS__, please call to schedule your appointment [phone].    ______________________________________________________________________    Anesthesia Discharge Instructions    After general anesthesia or intervenous sedation, for 24 hours or while taking prescription Narcotics:  · Limit your activities  · Do not drive or operate hazardous machinery  · If you have not urinated within 8 hours after discharge, please contact your surgeon on call. · Do not make important personal or business decisions  · Do not drink alcoholic beverages    Report the following to your surgeon:  · Excessive pain, swelling, redness or odor of or around the surgical area  · Temperature over 100.5 degrees  · Nausea and vomiting lasting longer than 4 hours or if unable to take medication  · Any signs of decreased circulation or nerve impairment to extremity:  Change in color, persistent numbness, tingling, coldness or increased pain. · Any questions  If you experience any of the following symptoms as noted above, please follow up with Dr. Facundo Grayson. What to do at Home:  Recommended Activity: See surgical instructions above from Dr. Facundo Grayson. Recommended Diet: Resume regular diet as tolerated. Nothing heavy, greasy, fatty, or fried. Please make sure you have food on your stomach prior to taking narcotic pain medication. *  Please give a list of your current medications to your Primary Care Provider. *  Please update this list whenever your medications are discontinued, doses are changed, or new medications (including over-the-counter products) are added. *  Please carry medication information at all times in case of emergency situations.     Community Education:    These are general instructions for a healthy lifestyle:    No smoking/ No tobacco products/ Avoid exposure to second hand smoke. Surgeon General's Warning:  Quitting smoking now greatly reduces serious risk to your health. Obesity, smoking, and sedentary lifestyle greatly increases your risk for illness. A healthy diet, regular physical exercise & weight monitoring are important for maintaining a healthy lifestyle    You may be retaining fluid if you have a history of heart failure or if you experience any of the following symptoms:  Weight gain of 3 pounds or more overnight or 5 pounds in a week, increased swelling in our hands or feet or shortness of breath while lying flat in bed. Please call your doctor as soon as you notice any of these symptoms; do not wait until your next office visit. The discharge information has been reviewed with the patient and caregiver. The patient and caregiver verbalized understanding. Discharge medications reviewed with the patient and caregiver and appropriate educational materials and side effects teaching were provided.   ___________________________________________________________________________________________________________________________________

## 2020-07-07 NOTE — PERIOP NOTES
Patient's vital signs within normal limits. Patient denies post-operative pain. Patient tolerating PO intake, denies nausea. Peripheral IV removed with no signs of infiltration. Patient discharged by RN via wheelchair to 's care/car and prior home environment from Phase 1 area.

## 2020-08-21 ENCOUNTER — TELEPHONE (OUTPATIENT)
Dept: FAMILY MEDICINE CLINIC | Age: 70
End: 2020-08-21

## 2020-08-21 DIAGNOSIS — K21.9 GASTROESOPHAGEAL REFLUX DISEASE WITHOUT ESOPHAGITIS: Primary | ICD-10-CM

## 2020-08-21 DIAGNOSIS — M19.90 ARTHRITIS: ICD-10-CM

## 2020-08-21 RX ORDER — MELOXICAM 15 MG/1
15 TABLET ORAL DAILY
Qty: 90 TAB | Refills: 3 | Status: SHIPPED | OUTPATIENT
Start: 2020-08-21 | End: 2020-09-24 | Stop reason: SDUPTHER

## 2020-08-21 RX ORDER — PANTOPRAZOLE SODIUM 40 MG/1
40 TABLET, DELAYED RELEASE ORAL DAILY
Qty: 90 TAB | Refills: 3 | Status: SHIPPED | OUTPATIENT
Start: 2020-08-21 | End: 2021-09-13

## 2020-08-21 NOTE — TELEPHONE ENCOUNTER
1. Gastroesophageal reflux disease without esophagitis    - pantoprazole (PROTONIX) 40 mg tablet; Take 1 Tab by mouth daily. Dispense: 90 Tab; Refill: 3    2. Arthritis    - meloxicam (MOBIC) 15 mg tablet; Take 1 Tab by mouth daily. Dispense: 90 Tab;  Refill: 3

## 2020-08-21 NOTE — TELEPHONE ENCOUNTER
Pt left message. States that she is not able to get an appt until later September. Needs refills for Pantoprazole 40 mg and Meloxicam 15 mg.

## 2020-09-22 VITALS
WEIGHT: 228 LBS | BODY MASS INDEX: 38.93 KG/M2 | TEMPERATURE: 98.6 F | SYSTOLIC BLOOD PRESSURE: 144 MMHG | DIASTOLIC BLOOD PRESSURE: 62 MMHG | HEART RATE: 97 BPM | HEIGHT: 64 IN | OXYGEN SATURATION: 94 %

## 2020-09-22 PROBLEM — F32.A DEPRESSIVE DISORDER: Status: ACTIVE | Noted: 2020-09-22

## 2020-09-22 PROBLEM — E11.9 TYPE 2 DIABETES MELLITUS WITHOUT COMPLICATION (HCC): Status: ACTIVE | Noted: 2020-09-22

## 2020-09-22 PROBLEM — M19.90 OSTEOARTHRITIS: Status: ACTIVE | Noted: 2020-09-22

## 2020-09-22 PROBLEM — C50.919 MALIGNANT TUMOR OF BREAST (HCC): Status: ACTIVE | Noted: 2020-09-22

## 2020-09-22 PROBLEM — E03.9 HYPOTHYROIDISM: Status: ACTIVE | Noted: 2020-09-22

## 2020-09-22 PROBLEM — I10 ESSENTIAL HYPERTENSION: Status: ACTIVE | Noted: 2020-09-22

## 2020-09-22 PROBLEM — E78.00 PURE HYPERCHOLESTEROLEMIA: Status: ACTIVE | Noted: 2020-09-22

## 2020-09-22 RX ORDER — FAMOTIDINE 20 MG/1
20 TABLET, FILM COATED ORAL 2 TIMES DAILY
COMMUNITY
End: 2021-05-20

## 2020-09-22 RX ORDER — PAROXETINE 10 MG/1
TABLET, FILM COATED ORAL DAILY
COMMUNITY
End: 2021-05-20

## 2020-09-22 RX ORDER — DEXTROMETHORPHAN HYDROBROMIDE, GUAIFENESIN 5; 100 MG/5ML; MG/5ML
650 LIQUID ORAL EVERY 8 HOURS
COMMUNITY
End: 2021-05-20 | Stop reason: ALTCHOICE

## 2020-09-22 RX ORDER — ALBUTEROL SULFATE 90 UG/1
2 AEROSOL, METERED RESPIRATORY (INHALATION)
COMMUNITY
End: 2021-05-20 | Stop reason: ALTCHOICE

## 2020-09-22 RX ORDER — TRAMADOL HYDROCHLORIDE 50 MG/1
50 TABLET ORAL
COMMUNITY
End: 2020-09-24 | Stop reason: SDUPTHER

## 2020-09-24 ENCOUNTER — OFFICE VISIT (OUTPATIENT)
Dept: FAMILY MEDICINE CLINIC | Age: 70
End: 2020-09-24
Payer: MEDICARE

## 2020-09-24 VITALS
TEMPERATURE: 96 F | BODY MASS INDEX: 38.58 KG/M2 | HEART RATE: 87 BPM | SYSTOLIC BLOOD PRESSURE: 153 MMHG | WEIGHT: 226 LBS | HEIGHT: 64 IN | OXYGEN SATURATION: 97 % | DIASTOLIC BLOOD PRESSURE: 76 MMHG

## 2020-09-24 DIAGNOSIS — Z13.31 SCREENING FOR DEPRESSION: ICD-10-CM

## 2020-09-24 DIAGNOSIS — E03.9 ACQUIRED HYPOTHYROIDISM: ICD-10-CM

## 2020-09-24 DIAGNOSIS — J44.9 CHRONIC OBSTRUCTIVE PULMONARY DISEASE, UNSPECIFIED COPD TYPE (HCC): ICD-10-CM

## 2020-09-24 DIAGNOSIS — K21.9 GASTROESOPHAGEAL REFLUX DISEASE WITHOUT ESOPHAGITIS: ICD-10-CM

## 2020-09-24 DIAGNOSIS — E11.21 TYPE 2 DIABETES WITH NEPHROPATHY (HCC): ICD-10-CM

## 2020-09-24 DIAGNOSIS — Z13.39 SCREENING FOR ALCOHOLISM: ICD-10-CM

## 2020-09-24 DIAGNOSIS — M75.122 NONTRAUMATIC COMPLETE TEAR OF LEFT ROTATOR CUFF: ICD-10-CM

## 2020-09-24 DIAGNOSIS — C50.911 MALIGNANT NEOPLASM OF RIGHT FEMALE BREAST, UNSPECIFIED ESTROGEN RECEPTOR STATUS, UNSPECIFIED SITE OF BREAST (HCC): ICD-10-CM

## 2020-09-24 DIAGNOSIS — M19.90 ARTHRITIS: ICD-10-CM

## 2020-09-24 DIAGNOSIS — I10 ESSENTIAL HYPERTENSION: Primary | ICD-10-CM

## 2020-09-24 DIAGNOSIS — Z00.00 MEDICARE ANNUAL WELLNESS VISIT, SUBSEQUENT: ICD-10-CM

## 2020-09-24 DIAGNOSIS — E78.00 PURE HYPERCHOLESTEROLEMIA: ICD-10-CM

## 2020-09-24 DIAGNOSIS — M15.9 PRIMARY OSTEOARTHRITIS INVOLVING MULTIPLE JOINTS: ICD-10-CM

## 2020-09-24 PROBLEM — E66.01 SEVERE OBESITY (HCC): Status: ACTIVE | Noted: 2020-09-24

## 2020-09-24 PROCEDURE — G9717 DOC PT DX DEP/BP F/U NT REQ: HCPCS | Performed by: FAMILY MEDICINE

## 2020-09-24 PROCEDURE — G0439 PPPS, SUBSEQ VISIT: HCPCS | Performed by: FAMILY MEDICINE

## 2020-09-24 PROCEDURE — G8427 DOCREV CUR MEDS BY ELIG CLIN: HCPCS | Performed by: FAMILY MEDICINE

## 2020-09-24 PROCEDURE — G8399 PT W/DXA RESULTS DOCUMENT: HCPCS | Performed by: FAMILY MEDICINE

## 2020-09-24 PROCEDURE — G8536 NO DOC ELDER MAL SCRN: HCPCS | Performed by: FAMILY MEDICINE

## 2020-09-24 PROCEDURE — G0444 DEPRESSION SCREEN ANNUAL: HCPCS | Performed by: FAMILY MEDICINE

## 2020-09-24 PROCEDURE — G8419 CALC BMI OUT NRM PARAM NOF/U: HCPCS | Performed by: FAMILY MEDICINE

## 2020-09-24 PROCEDURE — G9899 SCRN MAM PERF RSLTS DOC: HCPCS | Performed by: FAMILY MEDICINE

## 2020-09-24 PROCEDURE — 99213 OFFICE O/P EST LOW 20 MIN: CPT | Performed by: FAMILY MEDICINE

## 2020-09-24 PROCEDURE — 1101F PT FALLS ASSESS-DOCD LE1/YR: CPT | Performed by: FAMILY MEDICINE

## 2020-09-24 PROCEDURE — 3044F HG A1C LEVEL LT 7.0%: CPT | Performed by: FAMILY MEDICINE

## 2020-09-24 RX ORDER — MELOXICAM 15 MG/1
15 TABLET ORAL DAILY
Qty: 90 TAB | Refills: 3 | Status: SHIPPED | OUTPATIENT
Start: 2020-09-24 | End: 2021-06-14

## 2020-09-24 RX ORDER — SITAGLIPTIN AND METFORMIN HYDROCHLORIDE 500; 50 MG/1; MG/1
1 TABLET, FILM COATED ORAL 2 TIMES DAILY WITH MEALS
Qty: 180 TAB | Refills: 3 | Status: SHIPPED | OUTPATIENT
Start: 2020-09-24 | End: 2021-05-20 | Stop reason: ALTCHOICE

## 2020-09-24 RX ORDER — TRAMADOL HYDROCHLORIDE 50 MG/1
50 TABLET ORAL
Qty: 120 TAB | Refills: 4 | Status: SHIPPED | OUTPATIENT
Start: 2020-09-24 | End: 2020-10-24

## 2020-09-24 NOTE — PATIENT INSTRUCTIONS
Medicare Wellness Visit, Female     The best way to live healthy is to have a lifestyle where you eat a well-balanced diet, exercise regularly, limit alcohol use, and quit all forms of tobacco/nicotine, if applicable. Regular preventive services are another way to keep healthy. Preventive services (vaccines, screening tests, monitoring & exams) can help personalize your care plan, which helps you manage your own care. Screening tests can find health problems at the earliest stages, when they are easiest to treat. [facility] follows the current, evidence-based guidelines published by the [facility] ([facility]) when recommending preventive services for our patients. Because we follow these guidelines, sometimes recommendations change over time as research supports it. (For example, mammograms used to be recommended annually. Even though Medicare will still pay for an annual mammogram, the newer guidelines recommend a mammogram every two years for women of average risk). Of course, you and your doctor may decide to screen more often for some diseases, based on your risk and your co-morbidities (chronic disease you are already diagnosed with). Preventive services for you include:  - Medicare offers their members a free annual wellness visit, which is time for you and your primary care provider to discuss and plan for your preventive service needs. Take advantage of this benefit every year!  -All adults over the age of 72 should receive the recommended pneumonia vaccines. Current USPSTF guidelines recommend a series of two vaccines for the best pneumonia protection.   -All adults should have a flu vaccine yearly and a tetanus vaccine every 10 years.   -All adults age 48 and older should receive the shingles vaccines (series of two vaccines).       -All adults age 38-68 who are overweight should have a diabetes screening test once every three years.   -All adults born between [birth year] and 1967 should be screened once for Hepatitis C.  -Other screening tests and preventive services for persons with diabetes include: an eye exam to screen for diabetic retinopathy, a kidney function test, a foot exam, and stricter control over your cholesterol.   -Cardiovascular screening for adults with routine risk involves an electrocardiogram (ECG) at intervals determined by your doctor.   -Colorectal cancer screenings should be done for adults age 54-65 with no increased risk factors for colorectal cancer. There are a number of acceptable methods of screening for this type of cancer. Each test has its own benefits and drawbacks. Discuss with your doctor what is most appropriate for you during your annual wellness visit. The different tests include: colonoscopy (considered the best screening method), a fecal occult blood test, a fecal DNA test, and sigmoidoscopy.    -A bone mass density test is recommended when a woman turns 65 to screen for osteoporosis. This test is only recommended one time, as a screening. Some providers will use this same test as a disease monitoring tool if you already have osteoporosis. -Breast cancer screenings are recommended every other year for women of normal risk, age 54-69.  -Cervical cancer screenings for women over age 72 are only recommended with certain risk factors.      Here is a list of your current Health Maintenance items (your personalized list of preventive services) with a due date:  Health Maintenance Due   Topic Date Due    Hepatitis C Test  1950    Diabetic Foot Care  12/08/1958    Albumin Urine Test  12/08/1958    Eye Exam  12/08/1958    Cholesterol Test   12/08/1958    DTaP/Tdap/Td  (1 - Tdap) 12/08/1969    Shingles Vaccine (1 of 2) 12/08/1998    Mammogram  12/08/1998    Colon Cancer Stool Test  12/08/1998    Glaucoma Screening   12/08/2013    Bone Mineral Density   12/08/2013    Pneumococcal Vaccine (1 of 1 - PPSV23) 12/08/2013    Annual Well Visit  02/17/2020    Yearly Flu Vaccine (1) 09/01/2020     General Health and concerns:  HEART HEALTHY DIET:  A heart healthy diet is one that is low in cholesterol (less than 300 mg daily), fat (less than 80 g daily) . You should also minimize carbohydrates / sugars (less amounts of breads, pastas, potato and potato products and sugary foods/snacks, cookies, cakes, etc) . Try to eat whole wheat/multigrain breads and pastas and eat more vegetables. Cook with olive oil (or no oil) and grill, bake, broil or boil foods. Less red meat and more chicken , fish and lean cuts of beef (limited). 9566-3935 calories per day is sufficient [id number] is acceptable for weight loss. EXERCISE:  You should do exercise 3-5 days per week (minimum) to include increasing your heart rate for 30 to 45 minutes. At least a pace of a brisk walk should do that. This build up your heart and lung endurance and muscles and helps many function of the body. OTHER:      Routine Health maintenance: You need to get a yearly follow up/physical exam to review, discuss age and gender appropriate exams, labs, vaccines and screening tests. This includes cardiovascular health risk, cancer screens and other tim related topics. Medications-take all medications as directed. Please do not stop unless you talk to your doctor or health care provider first. Report any problems immediately. Referrals: if you have been given a referral, please call the office if you do not hear from provider in one week. You may make the appointment yourself. Please keep all appointments with specialists and ask them to send their notes, thoughts, recommendations to us , as your PCP. Imaging/Labs:  Be sure to get these images in a timely manner. IF your test must be scheduled, let us know if you need help getting this done and if you do not hear from that provider in a week , call us or them.   BE SURE to call the office if you do not hear regarding the results in one week after the test is performed Image or lab). It is our intention to inform you of the results ALWAYS, even if normal you should get a notification (Call, portal message). PLEASE juliana if you do not get the results. PLEASE follow all recommendations and call/come in /ask questions if you do not understand of if problems develop after or in between visits. Failure to comply with recommended health care advise could result in serious health consequences. Thank you for choosing our practice and please let us know how we can help you feel better and stay well!

## 2020-09-24 NOTE — PROGRESS NOTES
ID:  Susan Newell , 68 y.o., female  CC:   Follow up diabetes, copd, htn and wellness visit    HPI:   Eloisa Chapman comes in for a follow up and wellness visit. She has had a lot going on since her last visit in February. She had a rotator cuff repair that fortunately has went smoothly. Saw her ortho for the last time with a good report. She also had a colonoscopy in June and showed 2 small polyps and they were benign. She is not sure of her next one but will follow up with  Surgeon for that. Mammo done in May was good, has history of breast cancer. Needs pantoprazole refilled and has no hematochezia or melena. Sugars are \"good\". Tramadol she still takes for shoulder pain, knee pain, back pain. No dizziness, drowsiness some constipation. PMHX:      Patient Active Problem List   Diagnosis Code    Nontraumatic complete tear of left rotator cuff M75.122    GERD (gastroesophageal reflux disease) K21.9    Arthritis M19.90    Depressive disorder F32.9    Essential hypertension I10    Hypothyroidism E03.9    Malignant tumor of breast ([facility] [address] C50.919    Osteoarthritis M19.90    Pure hypercholesterolemia E78.00    Type 2 diabetes mellitus without complication ([facility] [address] M76.8    Severe obesity (HCC) E66.01       ALLERGIES:     Allergies   Allergen Reactions    Ampicillin Unknown (comments)    Erythromycin Base Unknown (comments)    Other Medication Other (comments)     TUBERCULOSIS  PT GETS CELLULITIS            Parafon Forte Dsc [Chlorzoxazone] Unknown (comments)       MEDICATIONS:     Current Outpatient Medications:     traMADoL (ULTRAM) 50 mg tablet, Take 50 mg by mouth every six (6) hours as needed for Pain., Disp: , Rfl:     albuterol (Ventolin HFA) 90 mcg/actuation inhaler, Take 2 Puffs by inhalation every four (4) hours as needed for Wheezing., Disp: , Rfl:     meloxicam (MOBIC) 15 mg tablet, Take 1 Tab by mouth daily. , Disp: 90 Tab, Rfl: 3    pantoprazole (PROTONIX) 40 mg tablet, Take 1 Tab by mouth daily., Disp: 90 Tab, Rfl: 3    traZODone (DESYREL) 100 mg tablet, Take 100 mg by mouth four (4) times daily as needed. , Disp: , Rfl:     venlafaxine (EFFEXOR) 75 mg tablet, Take 75 mg by mouth daily. , Disp: , Rfl:     anastrozole (ARIMIDEX) 1 mg tablet, Take 1 mg by mouth daily. , Disp: , Rfl:     SITagliptin-metFORMIN (Janumet)  mg per tablet, Take 1 Tab by mouth two (2) times daily (with meals). , Disp: , Rfl:     rosuvastatin (CRESTOR) 20 mg tablet, Take 20 mg by mouth nightly., Disp: , Rfl:     levothyroxine (SYNTHROID) 100 mcg tablet, Take 100 mcg by mouth Daily (before breakfast). , Disp: , Rfl:     furosemide (LASIX) 20 mg tablet, Take 20 mg by mouth daily. , Disp: , Rfl:     aspirin delayed-release 81 mg tablet, Take 81 mg by mouth daily. , Disp: , Rfl:     omega-3 fatty acids/dha/epa (MEGARED PLANT-OMEGA-3 PO), Take 800 mg by mouth daily. , Disp: , Rfl:     vitamin E (AQUA GEMS) 400 unit capsule, Take 400 Units by mouth daily. , Disp: , Rfl:     cholecalciferol (Vitamin D3) (5000 Units/125 mcg) tab tablet, Take 5,000 Units by mouth daily. , Disp: , Rfl:     budesonide-formoteroL (Symbicort) 160-4.5 mcg/actuation HFAA, Take 2 Puffs by inhalation two (2) times a day., Disp: , Rfl:     umeclidinium (Incruse Ellipta) 62.5 mcg/actuation inhaler, Take 1 Puff by inhalation daily. , Disp: , Rfl:     famotidine (PEPCID) 20 mg tablet, Take 20 mg by mouth two (2) times a day., Disp: , Rfl:     MULTIVITAMIN PO, Take  by mouth., Disp: , Rfl:     PARoxetine (PAXIL) 10 mg tablet, Take  by mouth daily. , Disp: , Rfl:     tiotropium bromide (Spiriva Respimat) 1.25 mcg/actuation inhaler, Take 1 Puff by inhalation daily. , Disp: , Rfl:     acetaminophen (Tylenol Arthritis Pain) 650 mg TbER, Take 650 mg by mouth every eight (8) hours. 2 tablets daily, Disp: , Rfl:     magnesium 250 mg tab, Take 250 mg by mouth., Disp: , Rfl:     Lactobacillus rhamnosus GG (CULTURELLE PO), Take 1 Tab by mouth daily. , Disp: , Rfl: SOCIAL:   Social History     Tobacco Use    Smoking status: Current Every Day Smoker     Packs/day: 1.00     Years: 50.00     Pack years: 50.00    Smokeless tobacco: Never Used   Substance Use Topics    Alcohol use: Not Currently    Drug use: Never       SURGERIES:     Past Surgical History:   Procedure Laterality Date    BREAST SURGERY PROCEDURE UNLISTED Right 02/2016    LUMPECTOMY     HX CHOLECYSTECTOMY  2013    HX COLONOSCOPY  10/20/2017    HX COLONOSCOPY  2014    HX COLONOSCOPY  2007    HX HEENT      HX HYMENECTOMY      HX ORTHOPAEDIC Left 07/07/2020    left rotator cuff    NM MASTECTOMY, PARTIAL Right 2016        FAMILY HX:       Family History   Problem Relation Age of Onset    Thyroid Disease Mother     Heart Disease Mother     Hypertension Mother     Heart Attack Mother     Cancer Father         SPINE     Other Father         COMMITTED SUICIDE     Diabetes Brother     Heart Disease Brother     Diabetes Sister     Cancer Sister     Diabetes Brother     Heart Disease Brother     Diabetes Brother     Cancer Brother     Lung Disease Brother     Anesth Problems Neg Hx        Review of systems:     I personally collected this information from the patient , available records and family members present-[email or web address]  Review of Systems   Constitutional: Positive for malaise/fatigue. Negative for chills, diaphoresis, fever and weight loss. HENT: Negative for congestion, ear pain, hearing loss, nosebleeds, sinus pain, sore throat and tinnitus. Eyes: Negative for blurred vision, double vision, photophobia and pain. Respiratory: Negative for cough, sputum production and shortness of breath. Cardiovascular: Negative for chest pain, palpitations, orthopnea, claudication, leg swelling and PND. Gastrointestinal: Negative for abdominal pain, blood in stool, constipation, diarrhea, heartburn, melena, nausea and vomiting. Genitourinary: Negative for dysuria, frequency and urgency. Musculoskeletal: Positive for back pain, joint pain and myalgias. Negative for falls and neck pain. Skin: Negative for itching and rash. Neurological: Positive for dizziness and headaches. Negative for tingling, tremors, sensory change and focal weakness. Psychiatric/Behavioral: Positive for depression. Negative for suicidal ideas. The patient is nervous/anxious and has insomnia. Vitals:     Visit Vitals  BP (!) 153/76 (BP 1 Location: Left arm, BP Patient Position: Sitting)   Pulse 87   Temp (!) 96 °F (35.6 °C) (Temporal)   Ht 5' 4\" (1.626 m)   Wt 226 lb (102.5 kg)   SpO2 97%   BMI 38.79 kg/m²           PHYSICAL:     Physical Exam  Vitals signs reviewed. Constitutional:       General: She is not in acute distress. Appearance: Normal appearance. She is obese. She is not ill-appearing. HENT:      Right Ear: Tympanic membrane, ear canal and external ear normal.      Left Ear: Tympanic membrane and ear canal normal.      Nose: No congestion. Mouth/Throat:      Mouth: Mucous membranes are moist.      Pharynx: No oropharyngeal exudate or posterior oropharyngeal erythema. Eyes:      General: No scleral icterus. Extraocular Movements: Extraocular movements intact. Pupils: Pupils are equal, round, and reactive to light. Neck:      Musculoskeletal: No neck rigidity or muscular tenderness. Cardiovascular:      Rate and Rhythm: Normal rate and regular rhythm. Heart sounds: No friction rub. No gallop. Pulmonary:      Effort: Pulmonary effort is normal.      Breath sounds: No wheezing, rhonchi or rales. Abdominal:      General: Bowel sounds are normal. There is no distension. Palpations: Abdomen is soft. There is no mass. Musculoskeletal:         General: Swelling, tenderness and deformity present. Right lower leg: No edema. Left lower leg: No edema. Lymphadenopathy:      Cervical: No cervical adenopathy. Skin:     Coloration: Skin is not jaundiced. Findings: No erythema or rash. Neurological:      General: No focal deficit present. Mental Status: She is alert and oriented to person, place, and time. Cranial Nerves: No cranial nerve deficit. Sensory: No sensory deficit. Gait: Gait (Ambulation status: ) normal.   Psychiatric:         Mood and Affect: Mood normal.         Behavior: Behavior normal.         Thought Content: Thought content normal.         Judgment: Judgment normal.         ASSESSMENT/PLAN:      1. Medicare annual wellness visit, subsequent    Nursing staff wellness visit note reviewed. Advanced directives were discussed with the patient and information was given if appropriate. Tobacco use, alcohol screening, weight and body mass index, level of physical activity, nutrition, fall risk screenings were done. We also reviewed and discussed vaccinations. Those were ordered if indicated and patient requested. We discussed mammography, Pap smear and pelvic exam as well as bone density testing. Those were ordered if indicated. We discussed colon cancer screening, eye exam, depression screening, mental status/cognition and pain levels. Those items addressed with the patient were ordered this visit if indicated. Mentation is in medical nursing staff note and my office visit. Reviewed all information as noted. 2. Arthritis    - meloxicam (MOBIC) 15 mg tablet; Take 1 Tab by mouth daily. Dispense: 90 Tab; Refill: 3  - traMADoL (ULTRAM) 50 mg tablet; Take 1 Tab by mouth every six (6) hours as needed for Pain for up to 30 days. Max Daily Amount: 200 mg. Dispense: 120 Tab; Refill: 4    3. Screening for depression    - DEPRESSION SCREEN ANNUAL    4. Screening for alcoholism    5. Essential hypertension    - CBC WITH AUTOMATED DIFF  - URINALYSIS W/ RFLX MICROSCOPIC  - METABOLIC PANEL, COMPREHENSIVE    6. Type 2 diabetes with nephropathy (HCC)    - SITagliptin-metFORMIN (Janumet)  mg per tablet;  Take 1 Tab by mouth two (2) times daily (with meals). Dispense: 180 Tab; Refill: 3  - LIPID PANEL  - HEMOGLOBIN A1C WITH EAG  - MICROALBUMIN, UR, RAND W/ MICROALB/CREAT RATIO    7. Chronic obstructive pulmonary disease, unspecified COPD type ([facility] [address]      8. Gastroesophageal reflux disease without esophagitis  Needed a prescription for the pantoprazole which was done also. 9. Primary osteoarthritis involving multiple joints    Anti-inflammatories as indicated. She tries to use sparingly to avoid gastrointestinal and renal issues. 10. Malignant neoplasm of right female breast, unspecified estrogen receptor status, unspecified site of breast [facility])    New see oncologist/surgeon and routine mammography. 11. Pure hypercholesterolemia      12. 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 13. Acquired hypothyroidism    - TSH 3RD GENERATION          Discussion:    The patient and I discussed the following items/issues; Each diagnosis listed for today's visit including medications, treatment, testing such as labs, imagine, referrals and when to call regarding results and appointments. Reminded patient to keep any and all appointments with specialists, labs, imaging. Reminded patient to make sure we get copies of any specialists care, labs and imaging. Reminded patient to call of come by the office if there are any concerns, questions , comments or problems. The patient verbalized understanding of the care plan and all questions were answered to the patient's satisfaction prior to leaving the office. The patient was told that failure to comply with recommended testing could result in abnormal health consequences. The patient was instructed to have yearly routine health maintenance including but not limited to age appropriate vaccines, testing, screening exams. The patient actively participated in medical decision making.         FOLLOW UP:   Patient knows to keep any and all future visits scheduled unless told otherwise. Patient knows to call, come back if any concerns, questions, comments or problems arise. Follow-up and Dispositions    · Return for Follow up blood pressure, diabetes, thyroid, lipids, oa. This visit may have been completed , in part, with voice recognition software as well as typing and may have syntax errors despite editing.       Thomas Yung, DO

## 2020-09-24 NOTE — PROGRESS NOTES
This is the Subsequent Medicare Annual Wellness Exam, performed 12 months or more after the Initial AWV or the last Subsequent AWV    I have reviewed the patient's medical history in detail and updated the computerized patient record. History     Patient Active Problem List   Diagnosis Code    Nontraumatic complete tear of left rotator cuff M75.122    GERD (gastroesophageal reflux disease) K21.9    Arthritis M19.90    Depressive disorder F32.9    Essential hypertension I10    Hypothyroidism E03.9    Malignant tumor of breast ([address] C50.919    Osteoarthritis M19.90    Pure hypercholesterolemia E78.00    Type 2 diabetes mellitus without complication ([address] J53.4     Past Medical History:   Diagnosis Date    Arthritis     Asthma     Cancer ([address]     BREAST CANCER RIGHT BREAST LUMPECTOMY     Chronic pain     COPD (chronic obstructive pulmonary disease) ([address]     Depressive disorder 9/22/2020    Diabetes ([address]     Essential hypertension 9/22/2020    GERD (gastroesophageal reflux disease)     Hypothyroidism 9/22/2020    Malignant tumor of breast ([address] 9/22/2020    Osteoarthritis 9/22/2020    Pure hypercholesterolemia 9/22/2020    Sleep apnea     Thyroid disease 1980    REMOVED     Type 2 diabetes mellitus without complication ([address] 4/90/7494      Past Surgical History:   Procedure Laterality Date    BREAST SURGERY PROCEDURE UNLISTED Right 02/2016    LUMPECTOMY     HX CHOLECYSTECTOMY  2013    HX COLONOSCOPY  10/20/2017    HX COLONOSCOPY  2014    HX COLONOSCOPY  2007    HX HEENT      HX HYMENECTOMY      HX ORTHOPAEDIC Left 07/07/2020    left rotator cuff    [place] MASTECTOMY, PARTIAL Right 2016     Current Outpatient Medications   Medication Sig Dispense Refill    traMADoL (ULTRAM) 50 mg tablet Take 50 mg by mouth every six (6) hours as needed for Pain.  albuterol (Ventolin HFA) 90 mcg/actuation inhaler Take 2 Puffs by inhalation every four (4) hours as needed for Wheezing.       meloxicam (MOBIC) 15 mg tablet Take 1 Tab by mouth daily. 90 Tab 3    pantoprazole (PROTONIX) 40 mg tablet Take 1 Tab by mouth daily. 90 Tab 3    traZODone (DESYREL) 100 mg tablet Take 100 mg by mouth four (4) times daily as needed.  venlafaxine (EFFEXOR) 75 mg tablet Take 75 mg by mouth daily.  anastrozole (ARIMIDEX) 1 mg tablet Take 1 mg by mouth daily.  SITagliptin-metFORMIN (Janumet)  mg per tablet Take 1 Tab by mouth two (2) times daily (with meals).  rosuvastatin (CRESTOR) 20 mg tablet Take 20 mg by mouth nightly.  levothyroxine (SYNTHROID) 100 mcg tablet Take 100 mcg by mouth Daily (before breakfast).  furosemide (LASIX) 20 mg tablet Take 20 mg by mouth daily.  aspirin delayed-release 81 mg tablet Take 81 mg by mouth daily.  omega-3 fatty acids/dha/epa (MEGARED PLANT-OMEGA-3 PO) Take 800 mg by mouth daily.  vitamin E (AQUA GEMS) 400 unit capsule Take 400 Units by mouth daily.  cholecalciferol (Vitamin D3) (5000 Units/125 mcg) tab tablet Take 5,000 Units by mouth daily.  budesonide-formoteroL (Symbicort) 160-4.5 mcg/actuation HFAA Take 2 Puffs by inhalation two (2) times a day.  umeclidinium (Incruse Ellipta) 62.5 mcg/actuation inhaler Take 1 Puff by inhalation daily.  famotidine (PEPCID) 20 mg tablet Take 20 mg by mouth two (2) times a day.  MULTIVITAMIN PO Take  by mouth.  PARoxetine (PAXIL) 10 mg tablet Take  by mouth daily.  tiotropium bromide (Spiriva Respimat) 1.25 mcg/actuation inhaler Take 1 Puff by inhalation daily.  acetaminophen (Tylenol Arthritis Pain) 650 mg TbER Take 650 mg by mouth every eight (8) hours. 2 tablets daily      magnesium 250 mg tab Take 250 mg by mouth.  Lactobacillus rhamnosus GG (CULTURELLE PO) Take 1 Tab by mouth daily.        Allergies   Allergen Reactions    Ampicillin Unknown (comments)    Erythromycin Base Unknown (comments)    Other Medication Other (comments) TUBERCULOSIS  PT GETS CELLULITIS            Parafon Forte Dsc [Chlorzoxazone] Unknown (comments)       Family History   Problem Relation Age of Onset    Thyroid Disease Mother     Heart Disease Mother     Hypertension Mother     Heart Attack Mother     Cancer Father         SPINE     Other Father         COMMITTED SUICIDE     Diabetes Brother     Heart Disease Brother     Diabetes Sister     Cancer Sister     Diabetes Brother     Heart Disease Brother     Diabetes Brother     Cancer Brother     Lung Disease Brother     Anesth Problems Neg Hx      Social History     Tobacco Use    Smoking status: Current Every Day Smoker     Packs/day: 1.00     Years: 50.00     Pack years: 50.00    Smokeless tobacco: Never Used   Substance Use Topics    Alcohol use: Not Currently       Depression Risk Factor Screening:   No flowsheet data found. Alcohol Risk Screen   Do you average more than 1 drink per night or more than 7 drinks a week:  No    On any one occasion in the past three months have you have had more than 3 drinks containing alcohol:  No        Functional Ability and Level of Safety:   Hearing: Whisper Test done with normal results. Activities of Daily Living: The home contains: handrails  Patient does total self care     Ambulation: with no difficulty     Fall Risk:  Fall Risk Assessment, last 12 mths 9/24/2020   Able to walk? Yes   Fall in past 12 months? No     Abuse Screen:  Patient is not abused       Cognitive Screening   Has your family/caregiver stated any concerns about your memory: no     Cognitive Screening: Normal - Clock Drawing Test    Patient Care Team   Patient Care Team:  Jacinda Mcintyre DO as PCP - General (Family Medicine)  Jacinda Mcintyre DO as PCP - RE[facility]IL[facility]ATION [facility]    Assessment/Plan   Education and counseling provided:  Are appropriate based on today's review and evaluation    Diagnoses and all orders for this visit:    1. Arthritis    2.  Medicare annual wellness visit, subsequent    3. Screening for depression  -     DEPRESSION SCREEN ANNUAL    4. Screening for alcoholism    NO alcohol use    Health Maintenance Due   Topic Date Due    Hepatitis C Screening  1950    Foot Exam Q1  12/08/1958    MICROALBUMIN Q1  12/08/1958    Eye Exam Retinal or Dilated  12/08/1958    Lipid Screen  12/08/1958    DTaP/Tdap/Td series (1 - Tdap) 12/08/1969    Shingrix Vaccine Age 50> (1 of 2) 12/08/1998    Breast Cancer Screen Mammogram  12/08/1998    FOBT Q1Y Age 50-75  12/08/1998    GLAUCOMA SCREENING Q2Y  12/08/2013    Bone Densitometry (Dexa) Screening  12/08/2013    Pneumococcal 65+ years (1 of 1 - PPSV23) 12/08/2013    Medicare Yearly Exam  02/17/2020    Flu Vaccine (1) 09/01/2020       This has been reviewed and discussed with the patient, face to face by me personally. Please see my note for full exam and details.      Brant Pineda,

## 2020-10-02 ENCOUNTER — HOSPITAL ENCOUNTER (OUTPATIENT)
Dept: CT IMAGING | Age: 70
Discharge: HOME | End: 2020-10-02
Attending: INTERNAL MEDICINE
Payer: MEDICARE

## 2020-10-02 DIAGNOSIS — R91.1 LUNG NODULE: ICD-10-CM

## 2020-10-02 PROCEDURE — 71250 CT THORAX DX C-: CPT

## 2020-10-16 LAB
ALBUMIN SERPL-MCNC: 4.5 G/DL (ref 3.7–4.7)
ALBUMIN/CREAT UR: 22 MG/G CREAT (ref 0–29)
ALBUMIN/GLOB SERPL: 2 {RATIO} (ref 1.2–2.2)
ALP SERPL-CCNC: 132 IU/L (ref 39–117)
ALT SERPL-CCNC: 8 IU/L (ref 0–32)
APPEARANCE UR: CLEAR
AST SERPL-CCNC: 11 IU/L (ref 0–40)
BACTERIA #/AREA URNS HPF: NORMAL /[HPF]
BASOPHILS # BLD AUTO: 0.1 X10E3/UL (ref 0–0.2)
BASOPHILS NFR BLD AUTO: 1 %
BILIRUB SERPL-MCNC: 0.4 MG/DL (ref 0–1.2)
BILIRUB UR QL STRIP: NEGATIVE
BUN SERPL-MCNC: 13 MG/DL (ref 8–27)
BUN/CREAT SERPL: 11 (ref 12–28)
CALCIUM SERPL-MCNC: 11 MG/DL (ref 8.7–10.3)
CASTS URNS QL MICRO: NORMAL /LPF
CHLORIDE SERPL-SCNC: 103 MMOL/L (ref 96–106)
CHOLEST SERPL-MCNC: 169 MG/DL (ref 100–199)
CO2 SERPL-SCNC: 25 MMOL/L (ref 20–29)
COLOR UR: YELLOW
CREAT SERPL-MCNC: 1.14 MG/DL (ref 0.57–1)
CREAT UR-MCNC: 42 MG/DL
EOSINOPHIL # BLD AUTO: 0.2 X10E3/UL (ref 0–0.4)
EOSINOPHIL NFR BLD AUTO: 2 %
EPI CELLS #/AREA URNS HPF: NORMAL /HPF (ref 0–10)
ERYTHROCYTE [DISTWIDTH] IN BLOOD BY AUTOMATED COUNT: 14.4 % (ref 11.7–15.4)
EST. AVERAGE GLUCOSE BLD GHB EST-MCNC: 137 MG/DL
GLOBULIN SER CALC-MCNC: 2.3 G/DL (ref 1.5–4.5)
GLUCOSE SERPL-MCNC: 117 MG/DL (ref 65–99)
GLUCOSE UR QL: NEGATIVE
HBA1C MFR BLD: 6.4 % (ref 4.8–5.6)
HCT VFR BLD AUTO: 41.7 % (ref 34–46.6)
HDLC SERPL-MCNC: 44 MG/DL
HGB BLD-MCNC: 13.9 G/DL (ref 11.1–15.9)
HGB UR QL STRIP: NEGATIVE
IMM GRANULOCYTES # BLD AUTO: 0 X10E3/UL (ref 0–0.1)
IMM GRANULOCYTES NFR BLD AUTO: 0 %
KETONES UR QL STRIP: NEGATIVE
LDLC SERPL CALC-MCNC: 87 MG/DL (ref 0–99)
LEUKOCYTE ESTERASE UR QL STRIP: ABNORMAL
LYMPHOCYTES # BLD AUTO: 2.1 X10E3/UL (ref 0.7–3.1)
LYMPHOCYTES NFR BLD AUTO: 22 %
MCH RBC QN AUTO: 29.2 PG (ref 26.6–33)
MCHC RBC AUTO-ENTMCNC: 33.3 G/DL (ref 31.5–35.7)
MCV RBC AUTO: 88 FL (ref 79–97)
MICRO URNS: ABNORMAL
MICROALBUMIN UR-MCNC: 9.2 UG/ML
MONOCYTES # BLD AUTO: 1 X10E3/UL (ref 0.1–0.9)
MONOCYTES NFR BLD AUTO: 11 %
MUCOUS THREADS URNS QL MICRO: PRESENT
NEUTROPHILS # BLD AUTO: 6.2 X10E3/UL (ref 1.4–7)
NEUTROPHILS NFR BLD AUTO: 64 %
NITRITE UR QL STRIP: NEGATIVE
PH UR STRIP: 6 [PH] (ref 5–7.5)
PLATELET # BLD AUTO: 376 X10E3/UL (ref 150–450)
POTASSIUM SERPL-SCNC: 4.9 MMOL/L (ref 3.5–5.2)
PROT SERPL-MCNC: 6.8 G/DL (ref 6–8.5)
PROT UR QL STRIP: NEGATIVE
RBC # BLD AUTO: 4.76 X10E6/UL (ref 3.77–5.28)
RBC #/AREA URNS HPF: NORMAL /HPF (ref 0–2)
SODIUM SERPL-SCNC: 141 MMOL/L (ref 134–144)
SP GR UR: 1.01 (ref 1–1.03)
TRIGL SERPL-MCNC: 228 MG/DL (ref 0–149)
TSH SERPL DL<=0.005 MIU/L-ACNC: 4.38 UIU/ML (ref 0.45–4.5)
UROBILINOGEN UR STRIP-MCNC: 0.2 MG/DL (ref 0.2–1)
VLDLC SERPL CALC-MCNC: 38 MG/DL (ref 5–40)
WBC # BLD AUTO: 9.6 X10E3/UL (ref 3.4–10.8)
WBC #/AREA URNS HPF: NORMAL /HPF (ref 0–5)

## 2020-10-19 NOTE — PROGRESS NOTES
Thyroid is normal  Count is normal without anemia or infection  Cholesterol is good. The triglycerides (fat) is a little elevated so just watch the amount of fat intake in diet  Hemoglobin A1c is not bad at 6.4%, stable  Urinalysis is normal  Microalbumin is normal  Liver Function is normal.  Kidney function is stable with a very slightly elevated creatinine. Continue to stay hydrated.

## 2020-10-24 PROBLEM — Z00.00 MEDICARE ANNUAL WELLNESS VISIT, SUBSEQUENT: Status: RESOLVED | Noted: 2020-09-24 | Resolved: 2020-10-24

## 2020-12-30 RX ORDER — TRAZODONE HYDROCHLORIDE 100 MG/1
100 TABLET ORAL
OUTPATIENT
Start: 2020-12-30

## 2021-01-04 NOTE — TELEPHONE ENCOUNTER
Dose is likley one at hs not 4 tiomes daily Follow-up with your doctor. Increase your dose of Metformin to 500 mg 3 times a day for 2 weeks. Then go to 1000 mg in the morning and 500 mg in the evening for 2 weeks. Then increase the dose to 1000 mg twice a day. Return the emergency department if your symptoms worsen.

## 2021-01-08 DIAGNOSIS — I10 ESSENTIAL HYPERTENSION: Primary | ICD-10-CM

## 2021-01-11 RX ORDER — FUROSEMIDE 20 MG/1
20 TABLET ORAL DAILY
Qty: 90 TAB | Refills: 2 | Status: SHIPPED | OUTPATIENT
Start: 2021-01-11 | End: 2021-10-12

## 2021-03-30 RX ORDER — TRIAMCINOLONE ACETONIDE 1 MG/G
CREAM TOPICAL 2 TIMES DAILY
Qty: 45 G | Refills: 5 | Status: SHIPPED | OUTPATIENT
Start: 2021-03-30 | End: 2021-05-20 | Stop reason: ALTCHOICE

## 2021-03-30 RX ORDER — TRIAMCINOLONE ACETONIDE 1 MG/G
CREAM TOPICAL 2 TIMES DAILY
COMMUNITY
End: 2021-03-30 | Stop reason: SDUPTHER

## 2021-04-23 ENCOUNTER — VIRTUAL VISIT (OUTPATIENT)
Dept: FAMILY MEDICINE CLINIC | Age: 71
End: 2021-04-23
Payer: MEDICARE

## 2021-04-23 DIAGNOSIS — E11.21 TYPE 2 DIABETES WITH NEPHROPATHY (HCC): ICD-10-CM

## 2021-04-23 DIAGNOSIS — R19.7 DIARRHEA, UNSPECIFIED TYPE: ICD-10-CM

## 2021-04-23 DIAGNOSIS — M19.90 ARTHRITIS: ICD-10-CM

## 2021-04-23 DIAGNOSIS — C50.911 MALIGNANT NEOPLASM OF RIGHT FEMALE BREAST, UNSPECIFIED ESTROGEN RECEPTOR STATUS, UNSPECIFIED SITE OF BREAST (HCC): ICD-10-CM

## 2021-04-23 DIAGNOSIS — K21.9 GASTROESOPHAGEAL REFLUX DISEASE WITHOUT ESOPHAGITIS: ICD-10-CM

## 2021-04-23 DIAGNOSIS — F51.01 PRIMARY INSOMNIA: Primary | ICD-10-CM

## 2021-04-23 DIAGNOSIS — E03.9 ACQUIRED HYPOTHYROIDISM: ICD-10-CM

## 2021-04-23 DIAGNOSIS — E66.01 SEVERE OBESITY (HCC): ICD-10-CM

## 2021-04-23 DIAGNOSIS — J44.9 CHRONIC OBSTRUCTIVE PULMONARY DISEASE, UNSPECIFIED COPD TYPE (HCC): ICD-10-CM

## 2021-04-23 PROCEDURE — 99442 PR PHYS/QHP TELEPHONE EVALUATION 11-20 MIN: CPT | Performed by: FAMILY MEDICINE

## 2021-04-23 RX ORDER — TRAZODONE HYDROCHLORIDE 100 MG/1
100 TABLET ORAL
Qty: 90 TAB | Refills: 3 | Status: SHIPPED | OUTPATIENT
Start: 2021-04-23 | End: 2022-04-09 | Stop reason: SDUPTHER

## 2021-04-23 NOTE — PATIENT INSTRUCTIONS
     
Routine Health maintenance: You need to get a yearly follow up/physical exam to review, discuss age and gender appropriate exams, labs, vaccines and screening tests. This includes cardiovascular health risk, cancer screens and other tim related topics. Medications-take all medications as directed. Please do not stop unless you talk to your doctor or health care provider first. Report any problems immediately. Referrals: if you have been given a referral, please call the office if you do not hear from provider in one week. You may make the appointment yourself. Please keep all appointments with specialists and ask them to send their notes, thoughts, recommendations to us , as your PCP. Imaging/Labs:  Be sure to get these images in a timely manner. IF your test must be scheduled, let us know if you need help getting this done and if you do not hear from that provider in a week , call us or them.   BE SURE to call the office if you do not hear regarding the results in one week after the test is performed Image or lab). It is our intention to inform you of the results ALWAYS, even if normal you should get a notification (Call, portal message). PLEASE juliana if you do not get the results. PLEASE follow all recommendations and call/come in /ask questions if you do not understand of if problems develop after or in between visits. Failure to comply with recommended health care advise could result in serious health consequences. You have had a virtual visit today using technology that allows real-time interaction between you and the physician. It does NOT replace personal, face to face , in person visits with a health care provider. Please note that certain diagnoses and advised comes from knowledge of medical conditions and depends HEAVILY upon information you provide the physician. IF there are any concerns or you do not improve you should be seen in person, face to face by a healthcare provider. Thank you for choosing our practice and please let us know how we can help you feel better and stay well!

## 2021-04-23 NOTE — PROGRESS NOTES
Identifying Data:  Niko Unger  65 y.o. female     Due to current [facility] [address] asks for a telephone assessment today,  04/23/21    she understands this is a billable charge and that they may receive a bill depending on type of insurance. Patient agrees to proceed. CC:  Chief Complaint   Patient presents with    Diabetes        HPI:   has a past medical history of Arthritis, Asthma, Cancer ([address] Chronic pain, COPD (chronic obstructive pulmonary disease) ([address] Depressive disorder (9/22/2020), Diabetes ([address] Essential hypertension (9/22/2020), GERD (gastroesophageal reflux disease), Hypothyroidism (9/22/2020), Malignant tumor of breast ([facility] (9/22/2020), Osteoarthritis (9/22/2020), Pure hypercholesterolemia (9/22/2020), Sleep apnea, Thyroid disease (1980), and Type 2 diabetes mellitus without complication ([facility] [address] (9/15/5451). she asks for a call regarding diarrhea. She has had it for about 4 she says that is is the same thing that happened when she had her thyroid out of parameter last year. She was sent to a gastroenterologist who agreed and they did not do a colonoscopy. Up to that point she had diarrhea and afterwards it resolved and it has been about a year and a half since resolution. She has no blood, mucus or pus. She does have a little bit of abdominal pain and cramping that is relieved by defecation. She does have diabetes type 2 and is on Janumet twice daily. She says her sugars are Demarcus Megan has had surgery on her knees and rarely takes the tramadol now. Interestingly, she has knots on her arms bilaterally about the size of a pea in her upper shoulder area. Certainly, they are not painful but she is wondering what they are. Unfortunately, we cannot see her today and we have to do this telephonic assessment at her request.  Also, she is concerned because she has not had her tramadol in about a year.   She says that about the time Covid started last year she was trying to get refills and that she did not could not get through and she thought that I had stopped that because the pharmacy told her that there is several request to's which were denied. We did somehow send in a 50 mg dose stating 1 or 2 tablets orally at bedtime. I do not even see that result and it certainly could be on the old medical record. Interestingly, there is a prescription for tramadol on this program that says trazodone 100 mg 4 times daily as needed which is certainly not proper or appropriate and is incorrect. My surmise is that the pharmacy sent that back and perhaps I or medical staff thought it was a duplicate. Nonetheless she says she tried several times to get through and even told nursing staff. There is no record of such conversations. It does allow her to rest and improves her quality of life. I have personally reviewed the patients PAST MEDICAL HISTORY, MEDICATIONS, ALLERGIES, SOCIAL HISTORY, SURGICAL HISTORY and FAMILY HISTORY which is listed in Epic and updated as indicated. REVIEW OF SYSTEMS:  Review of Systems   Constitutional: Positive for malaise/fatigue. Negative for chills, diaphoresis, fever and weight loss. HENT: Negative for congestion, ear pain, hearing loss, nosebleeds, sinus pain, sore throat and tinnitus. Eyes: Negative for blurred vision, double vision, photophobia and pain. Respiratory: Negative for cough, sputum production and shortness of breath. Cardiovascular: Negative for chest pain, palpitations, orthopnea, claudication, leg swelling and PND. Gastrointestinal: Positive for diarrhea. Negative for abdominal pain, blood in stool, constipation, heartburn, melena, nausea and vomiting. Genitourinary: Negative for dysuria, frequency and urgency. Musculoskeletal: Positive for back pain, joint pain and myalgias. Negative for falls and neck pain. Skin: Negative for itching and rash.    Neurological: Negative for dizziness, tingling, tremors, sensory change, focal weakness and headaches. Psychiatric/Behavioral: Positive for depression. Negative for suicidal ideas. The patient has insomnia. The patient is not nervous/anxious. PHYSICAL:  NO physical today as this is a telephonic assessment. ASSESSMENT AND PLAN:   Discussion (regarding today's visit with Peggy Cherry); Misunderstanding about the trazodone will be investigated. However, I will send her in 100 mg to take every day at bedtime. It is not a controlled substance and there is no reason why she cannot take it. I can see no mention of why it was stopped if it was stopped I see no mention of. Also I will get some blood work checking her thyroid as well as her routine lab work for diabetes hypertension and dyslipidemia. I would like to see her in the office in 4 weeks to see how she is doing with everything. If his diarrhea continues past that point should there be any changes we will try to get her thyroid assessed and in the correct parameters but a gastroenterology consultation may be appropriate. She did not want anything for the diarrhea presently because it was not so bad but she did not want to keep her going. Should this change she will let me know. Regarding the knots I told her I would just have to see and examine them face-to-face. ICD-10-CM ICD-9-CM    1. Primary insomnia  F51.01 307.42 traZODone (DESYREL) 100 mg tablet   2. Chronic obstructive pulmonary disease, unspecified COPD type ([facility]  J44.9 496    3. Type 2 diabetes with nephropathy (HCC)  E11.21 250.40 LIPID PANEL     516.58 METABOLIC PANEL, COMPREHENSIVE      HEMOGLOBIN A1C WITH EAG   4. Severe obesity ([facility]  E66.01 278.01    5. Malignant neoplasm of right female breast, unspecified estrogen receptor status, unspecified site of breast ([facility]  C50.911 174.9    6. Arthritis  M19.90 716.90    7. Acquired hypothyroidism  E03.9 244.9 TSH 3RD GENERATION   8.  Gastroesophageal reflux disease without esophagitis K21.9 530.81 CBC WITH AUTOMATED DIFF   9. Diarrhea, unspecified type  R19.7 787.91      WE reviewed each diagnosis listed for today's visit including medications, treatment, testing such as labs, imagine, referrals and when to call regarding results and appointments. Reminded patient to keep any and all appointments with specialists, labs, imaging. Reminded patient to make sure we get copies of any specialists care, labs and imaging. Reminded patient to call of come by the office if there are any concerns, questions , comments or problems. The patient verbalized understanding of the care plan and all questions were answered to the patient's satisfaction prior to leaving the office. The patient was told that failure to comply with recommended testing could result in abnormal health consequences. The patient was instructed to have yearly routine health maintenance including but not limited to age appropriate vaccines, testing, screening exams. ALL questions were answered to her satisfaction before leaving the office. Follow-up and Dispositions    · Return in about 4 weeks (around 5/21/2021), or Follow up diarrhea. This assessment has been completed telephonically Due to the COVID-19 Pandemic and the patient realizes it does NOT replace in persons, face to face visit with a healthcare provider.  Time spent on assessment was : 16 minutes      Mary Campbell, DO

## 2021-04-29 LAB
ALBUMIN SERPL-MCNC: 4.1 G/DL (ref 3.7–4.7)
ALBUMIN/GLOB SERPL: 2.1 {RATIO} (ref 1.2–2.2)
ALP SERPL-CCNC: 109 IU/L (ref 39–117)
ALT SERPL-CCNC: 14 IU/L (ref 0–32)
AST SERPL-CCNC: 20 IU/L (ref 0–40)
BASOPHILS # BLD AUTO: 0.1 X10E3/UL (ref 0–0.2)
BASOPHILS NFR BLD AUTO: 1 %
BILIRUB SERPL-MCNC: <0.2 MG/DL (ref 0–1.2)
BUN SERPL-MCNC: 12 MG/DL (ref 8–27)
BUN/CREAT SERPL: 10 (ref 12–28)
CALCIUM SERPL-MCNC: 10.4 MG/DL (ref 8.7–10.3)
CHLORIDE SERPL-SCNC: 102 MMOL/L (ref 96–106)
CHOLEST SERPL-MCNC: 122 MG/DL (ref 100–199)
CO2 SERPL-SCNC: 20 MMOL/L (ref 20–29)
CREAT SERPL-MCNC: 1.26 MG/DL (ref 0.57–1)
EOSINOPHIL # BLD AUTO: 0.1 X10E3/UL (ref 0–0.4)
EOSINOPHIL NFR BLD AUTO: 2 %
ERYTHROCYTE [DISTWIDTH] IN BLOOD BY AUTOMATED COUNT: 13 % (ref 11.7–15.4)
EST. AVERAGE GLUCOSE BLD GHB EST-MCNC: 120 MG/DL
GLOBULIN SER CALC-MCNC: 2 G/DL (ref 1.5–4.5)
GLUCOSE SERPL-MCNC: 78 MG/DL (ref 65–99)
HBA1C MFR BLD: 5.8 % (ref 4.8–5.6)
HCT VFR BLD AUTO: 38 % (ref 34–46.6)
HDLC SERPL-MCNC: 39 MG/DL
HGB BLD-MCNC: 12.3 G/DL (ref 11.1–15.9)
IMM GRANULOCYTES # BLD AUTO: 0 X10E3/UL (ref 0–0.1)
IMM GRANULOCYTES NFR BLD AUTO: 0 %
LDLC SERPL CALC-MCNC: 43 MG/DL (ref 0–99)
LYMPHOCYTES # BLD AUTO: 2.6 X10E3/UL (ref 0.7–3.1)
LYMPHOCYTES NFR BLD AUTO: 33 %
MCH RBC QN AUTO: 30.2 PG (ref 26.6–33)
MCHC RBC AUTO-ENTMCNC: 32.4 G/DL (ref 31.5–35.7)
MCV RBC AUTO: 93 FL (ref 79–97)
MONOCYTES # BLD AUTO: 0.9 X10E3/UL (ref 0.1–0.9)
MONOCYTES NFR BLD AUTO: 11 %
NEUTROPHILS # BLD AUTO: 4.2 X10E3/UL (ref 1.4–7)
NEUTROPHILS NFR BLD AUTO: 53 %
PLATELET # BLD AUTO: 321 X10E3/UL (ref 150–450)
POTASSIUM SERPL-SCNC: 4.2 MMOL/L (ref 3.5–5.2)
PROT SERPL-MCNC: 6.1 G/DL (ref 6–8.5)
RBC # BLD AUTO: 4.07 X10E6/UL (ref 3.77–5.28)
SODIUM SERPL-SCNC: 137 MMOL/L (ref 134–144)
TRIGL SERPL-MCNC: 262 MG/DL (ref 0–149)
TSH SERPL DL<=0.005 MIU/L-ACNC: 3.14 UIU/ML (ref 0.45–4.5)
VLDLC SERPL CALC-MCNC: 40 MG/DL (ref 5–40)
WBC # BLD AUTO: 7.9 X10E3/UL (ref 3.4–10.8)

## 2021-05-03 NOTE — PROGRESS NOTES
Blood count is normal without anemia or infection. Total cholesterol is great. The triglycerides are 262 and should be less than 150 and the HDL or good cholesterol is low so limiting the fried, greasy, breaded food should help. The kidney function is indicating a little progression in the chronic kidney disease range. May be time to consult with the nephrologist, not severe but do not want to be. A1c is actually great. Thyroid is totally normal so that may not be the cause of the diarrhea. Not sure if it could be the Januvia met. However, do we need to go back to gastroenterology? Let me know if you decide to go we will send you.

## 2021-05-20 ENCOUNTER — OFFICE VISIT (OUTPATIENT)
Dept: FAMILY MEDICINE CLINIC | Age: 71
End: 2021-05-20
Payer: MEDICARE

## 2021-05-20 ENCOUNTER — TRANSCRIBE ORDER (OUTPATIENT)
Dept: SCHEDULING | Age: 71
End: 2021-05-20

## 2021-05-20 VITALS
OXYGEN SATURATION: 97 % | BODY MASS INDEX: 36.37 KG/M2 | HEIGHT: 64 IN | HEART RATE: 78 BPM | SYSTOLIC BLOOD PRESSURE: 153 MMHG | WEIGHT: 213 LBS | TEMPERATURE: 97.7 F | DIASTOLIC BLOOD PRESSURE: 98 MMHG

## 2021-05-20 DIAGNOSIS — I10 ESSENTIAL HYPERTENSION: ICD-10-CM

## 2021-05-20 DIAGNOSIS — R19.7 DIARRHEA, UNSPECIFIED TYPE: ICD-10-CM

## 2021-05-20 DIAGNOSIS — Z85.3 HISTORY OF RIGHT BREAST CANCER: Primary | ICD-10-CM

## 2021-05-20 DIAGNOSIS — E11.21 TYPE 2 DIABETES WITH NEPHROPATHY (HCC): ICD-10-CM

## 2021-05-20 DIAGNOSIS — E03.9 ACQUIRED HYPOTHYROIDISM: ICD-10-CM

## 2021-05-20 DIAGNOSIS — J44.9 CHRONIC OBSTRUCTIVE PULMONARY DISEASE, UNSPECIFIED COPD TYPE (HCC): Primary | ICD-10-CM

## 2021-05-20 DIAGNOSIS — Z98.890 HISTORY OF LUMPECTOMY: ICD-10-CM

## 2021-05-20 DIAGNOSIS — M19.90 ARTHRITIS: ICD-10-CM

## 2021-05-20 PROCEDURE — 2022F DILAT RTA XM EVC RTNOPTHY: CPT | Performed by: FAMILY MEDICINE

## 2021-05-20 PROCEDURE — 3017F COLORECTAL CA SCREEN DOC REV: CPT | Performed by: FAMILY MEDICINE

## 2021-05-20 PROCEDURE — G8427 DOCREV CUR MEDS BY ELIG CLIN: HCPCS | Performed by: FAMILY MEDICINE

## 2021-05-20 PROCEDURE — 1101F PT FALLS ASSESS-DOCD LE1/YR: CPT | Performed by: FAMILY MEDICINE

## 2021-05-20 PROCEDURE — G9717 DOC PT DX DEP/BP F/U NT REQ: HCPCS | Performed by: FAMILY MEDICINE

## 2021-05-20 PROCEDURE — G8753 SYS BP > OR = 140: HCPCS | Performed by: FAMILY MEDICINE

## 2021-05-20 PROCEDURE — 99214 OFFICE O/P EST MOD 30 MIN: CPT | Performed by: FAMILY MEDICINE

## 2021-05-20 PROCEDURE — G9899 SCRN MAM PERF RSLTS DOC: HCPCS | Performed by: FAMILY MEDICINE

## 2021-05-20 PROCEDURE — 3044F HG A1C LEVEL LT 7.0%: CPT | Performed by: FAMILY MEDICINE

## 2021-05-20 PROCEDURE — G8399 PT W/DXA RESULTS DOCUMENT: HCPCS | Performed by: FAMILY MEDICINE

## 2021-05-20 PROCEDURE — 1090F PRES/ABSN URINE INCON ASSESS: CPT | Performed by: FAMILY MEDICINE

## 2021-05-20 PROCEDURE — G8754 DIAS BP LESS 90: HCPCS | Performed by: FAMILY MEDICINE

## 2021-05-20 PROCEDURE — G8417 CALC BMI ABV UP PARAM F/U: HCPCS | Performed by: FAMILY MEDICINE

## 2021-05-20 PROCEDURE — G8536 NO DOC ELDER MAL SCRN: HCPCS | Performed by: FAMILY MEDICINE

## 2021-05-20 RX ORDER — TRAMADOL HYDROCHLORIDE 50 MG/1
50 TABLET ORAL
COMMUNITY
End: 2022-05-18 | Stop reason: ALTCHOICE

## 2021-05-20 RX ORDER — BUDESONIDE, GLYCOPYRROLATE, AND FORMOTEROL FUMARATE 160; 9; 4.8 UG/1; UG/1; UG/1
AEROSOL, METERED RESPIRATORY (INHALATION)
COMMUNITY
Start: 2021-04-21 | End: 2021-11-18 | Stop reason: ALTCHOICE

## 2021-05-20 RX ORDER — OXYCODONE AND ACETAMINOPHEN 5; 325 MG/1; MG/1
TABLET ORAL
COMMUNITY
End: 2021-05-20 | Stop reason: ALTCHOICE

## 2021-05-20 NOTE — PROGRESS NOTES
IDENTIFYING INFORMATION:      Serafin Velasco , 65 y.o., female  [address],  [address] [facility]     Medical Record Number: [id number]          CHIEF COMPLAINT:     Chief Complaint   Patient presents with    Follow-up    Diarrhea     c/o loose stools for months.  Other     c/o knots that come and go on bilateral upper arms. HISTORY OF PRESENT ILLNESS:    Serafin Velasco is a 65 y.o. female  has a past medical history of Arthritis, Asthma, Cancer ([address] Chronic pain, COPD (chronic obstructive pulmonary disease) ([address] Depressive disorder (9/22/2020), Diabetes ([address] Essential hypertension (9/22/2020), GERD (gastroesophageal reflux disease), Hypothyroidism (9/22/2020), Malignant tumor of breast ([address] (9/22/2020), Osteoarthritis (9/22/2020), Pure hypercholesterolemia (9/22/2020), Sleep apnea, Thyroid disease (1980), and Type 2 diabetes mellitus without complication ([address] (2/60/3622). .  she comes in for follow up  Still having loose stools. Thought thyroid may be out of parameter and she has had normal TSH this time last time it was hyper thyroid. Colonoscopy in June 2020 was normal and has LDCT lings yearly and mammograms are up to date.        PAST MEDICAL HISTORY:     Past Medical History:   Diagnosis Date    Arthritis     Asthma     Cancer ([address]     BREAST CANCER RIGHT BREAST LUMPECTOMY     Chronic pain     COPD (chronic obstructive pulmonary disease) ([address]     Depressive disorder 9/22/2020    Diabetes ([address]     Essential hypertension 9/22/2020    GERD (gastroesophageal reflux disease)     Hypothyroidism 9/22/2020    Malignant tumor of breast ([address] 9/22/2020    Osteoarthritis 9/22/2020    Pure hypercholesterolemia 9/22/2020    Sleep apnea     Thyroid disease 1980    REMOVED     Type 2 diabetes mellitus without complication ([address] 0/54/6436       MEDICATIONS:     Current Outpatient Medications on File Prior to Visit   Medication Sig Dispense Refill    Xer[facility] Document [facility] Hospital[facility] 160-9-4.8 mcg/actuation HFAA INHALE 2 PUFFS BY MOUTH TWICE DAILY.  traMADoL (ULTRAM) 50 mg tablet Take 50 mg by mouth every six (6) hours as needed for Pain.  oxyCODONE-acetaminophen (PERCOCET) 5-325 mg per tablet Take  by mouth every six (6) hours as needed for Pain.  traZODone (DESYREL) 100 mg tablet Take 1 Tab by mouth nightly. 90 Tab 3    furosemide (LASIX) 20 mg tablet Take 1 Tab by mouth daily. 90 Tab 2    meloxicam (MOBIC) 15 mg tablet Take 1 Tab by mouth daily. 90 Tab 3    SITagliptin-metFORMIN (Janumet)  mg per tablet Take 1 Tab by mouth two (2) times daily (with meals). 180 Tab 3    pantoprazole (PROTONIX) 40 mg tablet Take 1 Tab by mouth daily. 90 Tab 3    venlafaxine (EFFEXOR) 75 mg tablet Take 75 mg by mouth daily.  anastrozole (ARIMIDEX) 1 mg tablet Take 1 mg by mouth daily.  rosuvastatin (CRESTOR) 20 mg tablet Take 20 mg by mouth nightly.  levothyroxine (SYNTHROID) 100 mcg tablet Take 100 mcg by mouth Daily (before breakfast).  aspirin delayed-release 81 mg tablet Take 81 mg by mouth daily.  omega-3 fatty acids/dha/epa (MEGARED PLANT-OMEGA-3 PO) Take 800 mg by mouth daily.  vitamin E (AQUA GEMS) 400 unit capsule Take 400 Units by mouth daily.  cholecalciferol (Vitamin D3) (5000 Units/125 mcg) tab tablet Take 5,000 Units by mouth daily.  Lactobacillus rhamnosus GG (CULTURELLE PO) Take 1 Tab by mouth daily.  [DISCONTINUED] triamcinolone acetonide (KENALOG) 0.1 % topical cream Apply  to affected area two (2) times a day. use thin layer (Patient not taking: Reported on 5/20/2021) 45 g 5    [DISCONTINUED] famotidine (PEPCID) 20 mg tablet Take 20 mg by mouth two (2) times a day.  [DISCONTINUED] MULTIVITAMIN PO Take  by mouth. (Patient not taking: Reported on 5/20/2021)      [DISCONTINUED] PARoxetine (PAXIL) 10 mg tablet Take  by mouth daily.       [DISCONTINUED] tiotropium bromide (Spiriva Respimat) 1.25 mcg/actuation inhaler Take 1 Puff by inhalation daily. (Patient not taking: Reported on 5/20/2021)      [DISCONTINUED] acetaminophen (Tylenol Arthritis Pain) 650 mg TbER Take 650 mg by mouth every eight (8) hours. 2 tablets daily (Patient not taking: Reported on 5/20/2021)      [DISCONTINUED] albuterol (Ventolin HFA) 90 mcg/actuation inhaler Take 2 Puffs by inhalation every four (4) hours as needed for Wheezing. (Patient not taking: Reported on 5/20/2021)      [DISCONTINUED] magnesium 250 mg tab Take 250 mg by mouth.  [DISCONTINUED] budesonide-formoteroL (Symbicort) 160-4.5 mcg/actuation HFAA Take 2 Puffs by inhalation two (2) times a day. (Patient not taking: Reported on 5/20/2021)      [DISCONTINUED] umeclidinium (Incruse Ellipta) 62.5 mcg/actuation inhaler Take 1 Puff by inhalation daily. (Patient not taking: Reported on 5/20/2021)       No current facility-administered medications on file prior to visit.        ALLERGIES:    Allergies   Allergen Reactions    Ampicillin Unknown (comments)    Erythromycin Base Unknown (comments)    Other Medication Other (comments)     TUBERCULOSIS  PT GETS CELLULITIS            Parafon Forte Dsc [Chlorzoxazone] Unknown (comments)         SOCIAL HISTORY:     Social History     Tobacco Use    Smoking status: Current Every Day Smoker     Packs/day: 1.00     Years: 50.00     Pack years: 50.00    Smokeless tobacco: Never Used   Substance Use Topics    Alcohol use: Not Currently    Drug use: Never       SURGICAL HISTORY:    Past Surgical History:   Procedure Laterality Date    HX CHOLECYSTECTOMY  2013    HX COLONOSCOPY  10/20/2017    HX COLONOSCOPY  2014    HX COLONOSCOPY  2007    HX HEENT      HX HYMENECTOMY      HX ORTHOPAEDIC Left 07/07/2020    left rotator cuff    NE BREAST SURGERY PROCEDURE UNLISTED Right 02/2016    LUMPECTOMY     NE MASTECTOMY, PARTIAL Right 2016        FAMILY HISTORY:    Family History   Problem Relation Age of Onset    Thyroid Disease Mother     Heart Disease Mother     Hypertension Mother     Heart Attack Mother     Cancer Father         SPINE     Other Father         COMMITTED SUICIDE     Diabetes Brother     Heart Disease Brother     Diabetes Sister     Cancer Sister     Diabetes Brother     Heart Disease Brother     Diabetes Brother     Cancer Brother     Lung Disease Brother     Anesth Problems Neg Hx          REVIEW OF SYSTEMS:    I personally collected this information from all available source present (patient/others in room and records available) -[email or web address]    Review of Systems   Constitutional: Negative for chills, diaphoresis, fever, malaise/fatigue and weight loss. HENT: Negative for congestion, ear pain, hearing loss, nosebleeds, sinus pain, sore throat and tinnitus. Eyes: Negative for blurred vision, double vision, photophobia and pain. Respiratory: Negative for cough, sputum production and shortness of breath. Cardiovascular: Negative for chest pain, palpitations, orthopnea, claudication, leg swelling and PND. Gastrointestinal: Positive for diarrhea. Negative for abdominal pain, blood in stool, constipation, heartburn, melena, nausea and vomiting. Genitourinary: Negative for dysuria, frequency and urgency. Musculoskeletal: Positive for back pain and joint pain. Negative for falls, myalgias and neck pain. Skin: Negative for itching and rash. Neurological: Negative for dizziness, tingling, tremors, sensory change, focal weakness and headaches. Psychiatric/Behavioral: Negative for depression and suicidal ideas. The patient is not nervous/anxious and does not have insomnia.           PHYSICAL EXAMINATION:    Vital Signs:    Visit Vitals  BP (!) 153/98 (BP 1 Location: Left upper arm, BP Patient Position: Sitting)   Pulse 78   Temp 97.7 °F (36.5 °C) (Temporal)   Ht 5' 4\" (1.626 m)   Wt 213 lb (96.6 kg)   SpO2 97%   BMI 36.56 kg/m²         Wt Readings from Last 3 Encounters:   05/20/21 213 lb (96.6 kg)   09/24/20 226 lb (102.5 kg)   02/25/20 228 lb (103.4 kg)     BP Readings from Last 3 Encounters:   05/20/21 (!) 153/98   09/24/20 (!) 153/76   02/25/20 (!) 144/62         Physical Exam  Vitals reviewed. Constitutional:       General: She is not in acute distress. Appearance: She is obese. She is not ill-appearing. Eyes:      General: No scleral icterus. Extraocular Movements: Extraocular movements intact. Conjunctiva/sclera: Conjunctivae normal.      Pupils: Pupils are equal, round, and reactive to light. Neck:      Vascular: No carotid bruit. Cardiovascular:      Rate and Rhythm: Normal rate and regular rhythm. Pulses: Normal pulses. Heart sounds: No murmur heard. No friction rub. No gallop. Pulmonary:      Effort: Pulmonary effort is normal.      Breath sounds: Normal breath sounds. No wheezing, rhonchi or rales. Abdominal:      General: There is no distension. Palpations: Abdomen is soft. There is no mass. Tenderness: There is no abdominal tenderness. There is no guarding. Musculoskeletal:         General: Tenderness and deformity present. No swelling or signs of injury. Cervical back: No rigidity. No muscular tenderness. Right lower leg: No edema. Left lower leg: No edema. Comments: Arthritic hands and knees and hips and shoulders   Lymphadenopathy:      Cervical: No cervical adenopathy. Skin:     General: Skin is warm and dry. Capillary Refill: Capillary refill takes less than 2 seconds. Coloration: Skin is not jaundiced. Findings: No bruising, erythema or rash. Neurological:      General: No focal deficit present. Mental Status: She is alert and oriented to person, place, and time. Cranial Nerves: No cranial nerve deficit. Sensory: No sensory deficit. Motor: No weakness.       Coordination: Coordination normal.      Gait: Gait normal.      Deep Tendon Reflexes: Reflexes normal.   Psychiatric:         Mood and Affect: Mood normal.         Behavior: Behavior normal.         Thought Content: Thought content normal.         Judgment: Judgment normal.           ASSESSMENT/PLAN:      Discussion (regarding today's visit with Lee Ann Huang); Patient did well and stopped the Metformin altogether. Since her sugars have been so good and her A1c was 5.8 we will do that. We will just do the Januvia 100 mg with supper  We will see her in 3 months for follow-up and lab work. ICD-10-CM ICD-9-CM    1. Chronic obstructive pulmonary disease, unspecified COPD type ([facility]  J44.9 496    2. Type 2 diabetes with nephropathy (HCC)  E11.21 250.40 SITagliptin (JANUVIA) 100 mg tablet     583.81    3. Arthritis  M19.90 716.90    4. Acquired hypothyroidism  E03.9 244.9    5. Diarrhea, unspecified type  R19.7 787.91    6. Essential hypertension  I10 401.9        WE reviewed each diagnosis listed for today's visit including medications, treatment, testing such as labs, imagine, referrals and when to call regarding results and appointments. Reminded patient to keep any and all appointments with specialists, labs, imaging. Reminded patient to make sure we get copies of any specialists care, labs and imaging. Reminded patient to call of come by the office if there are any concerns, questions , comments or problems. The patient verbalized understanding of the care plan and all questions were answered to the patient's satisfaction prior to leaving the office. The patient was told that failure to comply with recommended testing could result in abnormal health consequences. The patient was instructed to have yearly routine health maintenance including but not limited to age appropriate vaccines, testing, screening exams. ALL questions were answered to her satisfaction before leaving the office. The patient actively participated in medical decision making.         FOLLOW UP:     Patient knows to keep any and all future visits scheduled unless told otherwise. Patient knows to call, come back if any concerns, questions, comments or problems arise. Follow-up and Dispositions    · Return in about 3 months (around 8/20/2021) for follow up diabetes bp, weight loose stools. Noel Morales,     This visit was reviewed and signed electronically. It was been completed with voice recognition software and hand typing. It may have syntax and spelling errors despite editing.

## 2021-05-20 NOTE — PROGRESS NOTES
1. Have you been to the ER, urgent care clinic since your last visit? Hospitalized since your last visit? No    2. Have you seen or consulted any other health care providers outside of the [address] [place] [address] since your last visit? Include any pap smears or colon screening. Dr. Georgia Reilly, Dr. Richelle Griffiths, and cancer dr(Dr. Farzaneh Frank)    Chief Complaint   Patient presents with    Follow-up    Diarrhea     c/o loose stools for months.  Other     c/o knots that come and go on bilateral upper arms.       Visit Vitals  BP (!) 150/73 (BP 1 Location: Left upper arm, BP Patient Position: Sitting)   Pulse 74   Temp 97.7 °F (36.5 °C) (Temporal)   Ht 5' 4\" (1.626 m)   Wt 213 lb (96.6 kg)   SpO2 97%   BMI 36.56 kg/m²

## 2021-05-20 NOTE — PATIENT INSTRUCTIONS
General Health and concerns:  HEART HEALTHY DIET:  A heart healthy diet is one that is low in cholesterol (less than 300 mg daily), fat (less than 80 g daily) . You should also minimize carbohydrates / sugars (less amounts of breads, pastas, potato and potato products and sugary foods/snacks, cookies, cakes, etc) . Try to eat whole wheat/multigrain breads and pastas and eat more vegetables. Cook with olive oil (or no oil) and grill, bake, broil or boil foods. Less red meat and more chicken , fish and lean cuts of beef (limited). 6738-9726 calories per day is sufficient 4668-3612 is acceptable for weight loss. EXERCISE:  You should do exercise 3-5 days per week (minimum) to include increasing your heart rate for 30 to 45 minutes. At least a pace of a brisk walk should do that. This build up your heart and lung endurance and muscles and helps many function of the body. OTHER:        Routine Health maintenance: You need to get a yearly follow up/physical exam to review, discuss age and gender appropriate exams, labs, vaccines and screening tests. This includes cardiovascular health risk, cancer screens and other tim related topics. Medications-Take all medications as directed. Please do not stop unless you talk to your doctor or health care provider first. Report any problems immediately. Referrals: if you have been given a referral, please call the office if you do not hear from provider in one week. You may make the appointment yourself. Please keep all appointments with specialists and ask them to send their notes, thoughts, recommendations to us , as your PCP. Imaging/Labs:  Be sure to get these images in a timely manner. IF your test must be scheduled, let us know if you need help getting this done and if you do not hear from that provider in a week , call us or them.   BE SURE to call the office if you do not hear regarding the results in one week after the test is performed Image or lab). It is our intention to inform you of the results ALWAYS, even if normal you should get a notification (Call, portal message). PLEASE juliana if you do not get the results. PLEASE follow all recommendations and call/come in /ask questions if you do not understand of if problems develop after or in between visits. Failure to comply with recommended health care advise could result in serious health consequences. Thank you for choosing our practice and please let us know how we can help you feel better and stay well!

## 2021-06-03 RX ORDER — LEVOTHYROXINE SODIUM 100 UG/1
100 TABLET ORAL
Qty: 90 TABLET | Refills: 1 | Status: SHIPPED | OUTPATIENT
Start: 2021-06-03 | End: 2021-12-20

## 2021-06-03 RX ORDER — ROSUVASTATIN CALCIUM 20 MG/1
20 TABLET, COATED ORAL
Qty: 90 TABLET | Refills: 1 | Status: SHIPPED | OUTPATIENT
Start: 2021-06-03 | End: 2021-12-13

## 2021-06-14 DIAGNOSIS — M19.90 ARTHRITIS: ICD-10-CM

## 2021-06-14 RX ORDER — MELOXICAM 15 MG/1
TABLET ORAL
Qty: 90 TABLET | Refills: 3 | Status: SHIPPED | OUTPATIENT
Start: 2021-06-14 | End: 2022-08-02

## 2021-06-28 ENCOUNTER — OFFICE VISIT (OUTPATIENT)
Dept: FAMILY MEDICINE CLINIC | Age: 71
End: 2021-06-28
Payer: MEDICARE

## 2021-06-28 VITALS
BODY MASS INDEX: 37.22 KG/M2 | HEART RATE: 87 BPM | WEIGHT: 218 LBS | SYSTOLIC BLOOD PRESSURE: 134 MMHG | DIASTOLIC BLOOD PRESSURE: 81 MMHG | OXYGEN SATURATION: 97 % | HEIGHT: 64 IN | TEMPERATURE: 96.9 F

## 2021-06-28 DIAGNOSIS — E03.9 ACQUIRED HYPOTHYROIDISM: ICD-10-CM

## 2021-06-28 DIAGNOSIS — E11.21 TYPE 2 DIABETES WITH NEPHROPATHY (HCC): ICD-10-CM

## 2021-06-28 DIAGNOSIS — R22.33 MASS OF BOTH UPPER EXTREMITIES: ICD-10-CM

## 2021-06-28 DIAGNOSIS — J44.9 CHRONIC OBSTRUCTIVE PULMONARY DISEASE, UNSPECIFIED COPD TYPE (HCC): ICD-10-CM

## 2021-06-28 DIAGNOSIS — M19.90 ARTHRITIS: ICD-10-CM

## 2021-06-28 DIAGNOSIS — M70.21 OLECRANON BURSITIS OF RIGHT ELBOW: Primary | ICD-10-CM

## 2021-06-28 PROCEDURE — 2022F DILAT RTA XM EVC RTNOPTHY: CPT | Performed by: FAMILY MEDICINE

## 2021-06-28 PROCEDURE — G8536 NO DOC ELDER MAL SCRN: HCPCS | Performed by: FAMILY MEDICINE

## 2021-06-28 PROCEDURE — G9717 DOC PT DX DEP/BP F/U NT REQ: HCPCS | Performed by: FAMILY MEDICINE

## 2021-06-28 PROCEDURE — 3017F COLORECTAL CA SCREEN DOC REV: CPT | Performed by: FAMILY MEDICINE

## 2021-06-28 PROCEDURE — 99214 OFFICE O/P EST MOD 30 MIN: CPT | Performed by: FAMILY MEDICINE

## 2021-06-28 PROCEDURE — G8754 DIAS BP LESS 90: HCPCS | Performed by: FAMILY MEDICINE

## 2021-06-28 PROCEDURE — 3044F HG A1C LEVEL LT 7.0%: CPT | Performed by: FAMILY MEDICINE

## 2021-06-28 PROCEDURE — G9899 SCRN MAM PERF RSLTS DOC: HCPCS | Performed by: FAMILY MEDICINE

## 2021-06-28 PROCEDURE — 1090F PRES/ABSN URINE INCON ASSESS: CPT | Performed by: FAMILY MEDICINE

## 2021-06-28 PROCEDURE — 1101F PT FALLS ASSESS-DOCD LE1/YR: CPT | Performed by: FAMILY MEDICINE

## 2021-06-28 PROCEDURE — G8427 DOCREV CUR MEDS BY ELIG CLIN: HCPCS | Performed by: FAMILY MEDICINE

## 2021-06-28 PROCEDURE — G8752 SYS BP LESS 140: HCPCS | Performed by: FAMILY MEDICINE

## 2021-06-28 PROCEDURE — G8417 CALC BMI ABV UP PARAM F/U: HCPCS | Performed by: FAMILY MEDICINE

## 2021-06-28 PROCEDURE — G8399 PT W/DXA RESULTS DOCUMENT: HCPCS | Performed by: FAMILY MEDICINE

## 2021-06-28 RX ORDER — METHYLPREDNISOLONE 4 MG/1
TABLET ORAL
Qty: 1 DOSE PACK | Refills: 0 | Status: SHIPPED | OUTPATIENT
Start: 2021-06-28 | End: 2021-11-18 | Stop reason: ALTCHOICE

## 2021-06-28 NOTE — PATIENT INSTRUCTIONS
General Health and concerns:  HEART HEALTHY DIET:  A heart healthy diet is one that is low in cholesterol (less than 300 mg daily), fat (less than 80 g daily) . You should also minimize carbohydrates / sugars (less amounts of breads, pastas, potato and potato products and sugary foods/snacks, cookies, cakes, etc) . Try to eat whole wheat/multigrain breads and pastas and eat more vegetables. Cook with olive oil (or no oil) and grill, bake, broil or boil foods. Less red meat and more chicken , fish and lean cuts of beef (limited). 0343-6224 calories per day is sufficient 8930-7471 is acceptable for weight loss. EXERCISE:  You should do exercise 3-5 days per week (minimum) to include increasing your heart rate for 30 to 45 minutes. At least a pace of a brisk walk should do that. This build up your heart and lung endurance and muscles and helps many function of the body. OTHER:        Routine Health maintenance: You need to get a yearly follow up/physical exam to review, discuss age and gender appropriate exams, labs, vaccines and screening tests. This includes cardiovascular health risk, cancer screens and other tim related topics. Medications-Take all medications as directed. Please do not stop unless you talk to your doctor or health care provider first. Report any problems immediately. Referrals: if you have been given a referral, please call the office if you do not hear from provider in one week. You may make the appointment yourself. Please keep all appointments with specialists and ask them to send their notes, thoughts, recommendations to us , as your PCP. Imaging/Labs:  Be sure to get these images in a timely manner. IF your test must be scheduled, let us know if you need help getting this done and if you do not hear from that provider in a week , call us or them.   BE SURE to call the office if you do not hear regarding the results in one week after the test is performed Image or lab). It is our intention to inform you of the results ALWAYS, even if normal you should get a notification (Call, portal message). PLEASE juliana if you do not get the results. PLEASE follow all recommendations and call/come in /ask questions if you do not understand of if problems develop after or in between visits. Failure to comply with recommended health care advise could result in serious health consequences. Thank you for choosing our practice and please let us know how we can help you feel better and stay well!

## 2021-06-28 NOTE — PROGRESS NOTES
IDENTIFYING INFORMATION:      Niko Unger , 65 y.o., female  [address] ,  [address] [facility]     Medical Record Number: [id number]          CHIEF COMPLAINT:     Chief Complaint   Patient presents with    Elbow Swelling     c/o fluid build up in right arm. Only has pain when she hits it on something. HISTORY OF PRESENT ILLNESS:    Niko Unger is a 65 y.o. female  has a past medical history of Arthritis, Asthma, Cancer ([facility] Chronic pain, COPD (chronic obstructive pulmonary disease) ([address] Depressive disorder (9/22/2020), Diabetes ([facility] Essential hypertension (9/22/2020), GERD (gastroesophageal reflux disease), Hypothyroidism (9/22/2020), Malignant tumor of breast ([facility] (9/22/2020), Osteoarthritis (9/22/2020), Pure hypercholesterolemia (9/22/2020), Sleep apnea, Thyroid disease (1980), and Type 2 diabetes mellitus without complication ([facility] (6/70/5659). .  she comes in for plaint of right elbow swelling. She has for about 2 weeks. Is no pain. Is no injury per se. No discoloration. She denies fever, chills, sweats, nausea, vomiting. She also has chronic intermittent back pain and arthritis. She takes tramadol for. This provider is currently not allowed to prescribe it. Otherwise, she has no major complaints. Labs have been reviewed from last visit. She had to move her elbow but she keeps bumping it though it does not hurt severely she is afraid she will damage. She has no numbness, burning, pain, weakness in the extremity. She also has these masses arms that come and go. They are firm sore but not severe she is not sure what they are. She says she forgot to mention last time there just above her elbows on both arms.     PAST MEDICAL HISTORY:     Past Medical History:   Diagnosis Date    Arthritis     Asthma     Cancer ([address]     BREAST CANCER RIGHT BREAST LUMPECTOMY     Chronic pain     COPD (chronic obstructive pulmonary disease) (HCC)     Depressive disorder 9/22/2020    Diabetes ([facility]     Essential hypertension 9/22/2020    GERD (gastroesophageal reflux disease)     Hypothyroidism 9/22/2020    Malignant tumor of breast ([facility] 9/22/2020    Osteoarthritis 9/22/2020    Pure hypercholesterolemia 9/22/2020    Sleep apnea     Thyroid disease 1980    REMOVED     Type 2 diabetes mellitus without complication ([facility] 4/67/1793       MEDICATIONS:     Current Outpatient Medications on File Prior to Visit   Medication Sig Dispense Refill    meloxicam (MOBIC) 15 mg tablet TAKE 1 TABLET BY MOUTH EVERY DAY 90 Tablet 3    levothyroxine (SYNTHROID) 100 mcg tablet Take 1 Tablet by mouth Daily (before breakfast). 90 Tablet 1    rosuvastatin (CRESTOR) 20 mg tablet Take 1 Tablet by mouth nightly. 90 Tablet 1    Breztri Aerosphere 160-9-4.8 mcg/actuation HFAA INHALE 2 PUFFS BY MOUTH TWICE DAILY.  traMADoL (ULTRAM) 50 mg tablet Take 50 mg by mouth every six (6) hours as needed for Pain.  SITagliptin (JANUVIA) 100 mg tablet Take 1 Tablet by mouth daily. Stop janumet-we are stopping metformin altogether 90 Tablet 3    traZODone (DESYREL) 100 mg tablet Take 1 Tab by mouth nightly. 90 Tab 3    furosemide (LASIX) 20 mg tablet Take 1 Tab by mouth daily. 90 Tab 2    pantoprazole (PROTONIX) 40 mg tablet Take 1 Tab by mouth daily. 90 Tab 3    venlafaxine (EFFEXOR) 75 mg tablet Take 75 mg by mouth daily.  anastrozole (ARIMIDEX) 1 mg tablet Take 1 mg by mouth daily.  aspirin delayed-release 81 mg tablet Take 81 mg by mouth daily.  omega-3 fatty acids/dha/epa (MEGARED PLANT-OMEGA-3 PO) Take 800 mg by mouth daily.  vitamin E (AQUA GEMS) 400 unit capsule Take 400 Units by mouth daily.  cholecalciferol (Vitamin D3) (5000 Units/125 mcg) tab tablet Take 5,000 Units by mouth daily.  Lactobacillus rhamnosus GG (CULTURELLE PO) Take 1 Tab by mouth daily. No current facility-administered medications on file prior to visit.        ALLERGIES:    Allergies Allergen Reactions    Ampicillin Unknown (comments)    Erythromycin Base Unknown (comments)    Other Medication Other (comments)     TUBERCULOSIS  PT GETS CELLULITIS            Parafon Forte Dsc [Chlorzoxazone] Unknown (comments)         SOCIAL HISTORY:     Social History     Tobacco Use    Smoking status: Current Every Day Smoker     Packs/day: 1.00     Years: 50.00     Pack years: 50.00    Smokeless tobacco: Never Used   Substance Use Topics    Alcohol use: Not Currently    Drug use: Never       SURGICAL HISTORY:    Past Surgical History:   Procedure Laterality Date    HX CHOLECYSTECTOMY  2013    HX COLONOSCOPY  10/20/2017    HX COLONOSCOPY  2014    HX COLONOSCOPY  2007    HX HEENT      HX HYMENECTOMY      HX ORTHOPAEDIC Left 07/07/2020    left rotator cuff    MS BREAST SURGERY PROCEDURE UNLISTED Right 02/2016    LUMPECTOMY     MS MASTECTOMY, PARTIAL Right 2016        FAMILY HISTORY:    Family History   Problem Relation Age of Onset    Thyroid Disease Mother     Heart Disease Mother     Hypertension Mother     Heart Attack Mother     Cancer Father         SPINE     Other Father         COMMITTED SUICIDE     Diabetes Brother     Heart Disease Brother     Diabetes Sister     Cancer Sister     Diabetes Brother     Heart Disease Brother     Diabetes Brother     Cancer Brother     Lung Disease Brother     Anesth Problems Neg Hx          REVIEW OF SYSTEMS:    I personally collected this information from all available source present (patient/others in room and records available) -JLEWISDO    Review of Systems   Constitutional: Positive for malaise/fatigue. Negative for chills, diaphoresis and fever. HENT: Positive for hearing loss. Negative for congestion and sore throat. Respiratory: Positive for shortness of breath. Negative for cough, sputum production, wheezing and stridor. Cardiovascular: Negative for chest pain, palpitations, orthopnea and leg swelling.    Gastrointestinal: Negative for abdominal pain, constipation, diarrhea, nausea and vomiting. Genitourinary: Negative for dysuria, frequency and urgency. Musculoskeletal: Positive for back pain, joint pain and myalgias. Knots on arm and now a large one on right elbow. No injury per se. The knots on arms comes and goes. From elbow up. Wants to know why they are there. Skin: Negative for itching and rash. Neurological: Negative for dizziness, tingling, sensory change, focal weakness and headaches. Endo/Heme/Allergies: Does not bruise/bleed easily. Psychiatric/Behavioral: Positive for depression (off and on with problems). Negative for suicidal ideas. The patient is not nervous/anxious. PHYSICAL EXAMINATION:    Vital Signs:    Visit Vitals  /81 (BP 1 Location: Left upper arm, BP Patient Position: Sitting)   Pulse 87   Temp 96.9 °F (36.1 °C) (Temporal)   Ht 5' 4\" (1.626 m)   Wt 218 lb (98.9 kg)   SpO2 97%   BMI 37.42 kg/m²         Wt Readings from Last 3 Encounters:   06/28/21 218 lb (98.9 kg)   05/20/21 213 lb (96.6 kg)   09/24/20 226 lb (102.5 kg)     BP Readings from Last 3 Encounters:   06/28/21 134/81   05/20/21 (!) 153/98   09/24/20 (!) 153/76         Physical Exam  Constitutional:       General: She is not in acute distress. Appearance: She is obese. She is not ill-appearing or toxic-appearing. Eyes:      General: No scleral icterus. Extraocular Movements: Extraocular movements intact. Conjunctiva/sclera: Conjunctivae normal.   Cardiovascular:      Rate and Rhythm: Normal rate and regular rhythm. Heart sounds: No murmur heard. No friction rub. No gallop. Pulmonary:      Effort: Pulmonary effort is normal.      Breath sounds: Normal breath sounds. No wheezing, rhonchi or rales. Abdominal:      Palpations: Abdomen is soft. Tenderness: There is no abdominal tenderness. There is no guarding.    Musculoskeletal:         General: Swelling (Right olecranon bursa is approximately golf ball size,  nontender no erythema.) present. No tenderness or deformity. Cervical back: No tenderness. Right lower leg: No edema. Left lower leg: No edema. Comments: Normal range of motion joint. Slightly crepitant knees but no tenderness today. Tight lower lumbars but again no tenderness today. In the area of the biceps triceps division there is a deeper soft tissue mass that is nontender it feels slightly flat but round about dime size. There is a slightly smaller on the left. It is firm and nonmobile. Lymphadenopathy:      Cervical: No cervical adenopathy. Skin:     Coloration: Skin is not jaundiced. Findings: No erythema or rash. Neurological:      General: No focal deficit present. Mental Status: She is alert and oriented to person, place, and time. Mental status is at baseline. Psychiatric:         Mood and Affect: Mood normal.         Behavior: Behavior normal.         Thought Content: Thought content normal.         Judgment: Judgment normal.           ASSESSMENT/PLAN:      Discussion (regarding today's visit with Chikis Winter);  · I think the patient needs some prednisone to help with the bursitis and her arthritis  · We will go ahead and refer to a dermatologist for these masses in her upper arms bilaterally. · They do not feel like lipomas or cysts but soft tissue masses. · There is no lymphadenopathy axillary, cervical, submandibular or supraclavicular. ICD-10-CM ICD-9-CM    1. Olecranon bursitis of right elbow  M70.21 726.33 methylPREDNISolone (MEDROL DOSEPACK) 4 mg tablet   2. Arthritis  M19.90 716.90 methylPREDNISolone (MEDROL DOSEPACK) 4 mg tablet   3. Chronic obstructive pulmonary disease, unspecified COPD type ([facility]  J44.9 496    4. Type 2 diabetes with nephropathy (HCC)  E11.21 250.40      583.81    5. Acquired hypothyroidism  E03.9 244.9    6.  Mass of both upper extremities  R22.33 782.2 REFERRAL TO DERMATOLOGY         WE reviewed each diagnosis listed for today's visit including medications, treatment, testing such as labs, imagine, referrals and when to call regarding results and appointments. Reminded patient to keep any and all appointments with specialists, labs, imaging. Reminded patient to make sure we get copies of any specialists care, labs and imaging. Reminded patient to call of come by the office if there are any concerns, questions , comments or problems. The patient verbalized understanding of the care plan and all questions were answered to the patient's satisfaction prior to leaving the office. The patient was told that failure to comply with recommended testing could result in abnormal health consequences. The patient was instructed to have yearly routine health maintenance including but not limited to age appropriate vaccines, testing, screening exams. ALL questions were answered to her satisfaction before leaving the office. The patient actively participated in medical decision making. FOLLOW UP:     Patient knows to keep any and all future visits scheduled unless told otherwise. Patient knows to call, come back if any concerns, questions, comments or problems arise. Follow-up and Dispositions    · Return if symptoms worsen or fail to improve. Liset Bedolla, DO    This visit was reviewed and signed electronically. It was been completed with voice recognition software and hand typing. It may have syntax and spelling errors despite editing.

## 2021-06-28 NOTE — PROGRESS NOTES
1. Have you been to the ER, urgent care clinic since your last visit? Hospitalized since your last visit? No    2. Have you seen or consulted any other health care providers outside of the [address] [place] [address] since your last visit? Include any pap smears or colon screening. No    Chief Complaint   Patient presents with    Elbow Swelling     c/o fluid build up in right arm. Only has pain when she hits it on something.       Visit Vitals  BP (!) 152/66 (BP 1 Location: Left upper arm, BP Patient Position: Sitting)   Pulse 89   Temp 96.9 °F (36.1 °C) (Temporal)   Ht 5' 4\" (1.626 m)   Wt 218 lb (98.9 kg)   SpO2 97%   BMI 37.42 kg/m²

## 2021-07-05 ENCOUNTER — TRANSCRIBE ORDER (OUTPATIENT)
Dept: SCHEDULING | Age: 71
End: 2021-07-05

## 2021-07-05 DIAGNOSIS — R91.8 LUNG MASS: ICD-10-CM

## 2021-07-05 DIAGNOSIS — R91.1 LUNG NODULE: Primary | ICD-10-CM

## 2021-07-06 ENCOUNTER — HOSPITAL ENCOUNTER (OUTPATIENT)
Dept: MAMMOGRAPHY | Age: 71
Discharge: HOME | End: 2021-07-06
Attending: SURGERY
Payer: MEDICARE

## 2021-07-06 DIAGNOSIS — Z85.3 HISTORY OF RIGHT BREAST CANCER: ICD-10-CM

## 2021-07-06 DIAGNOSIS — Z98.890 HISTORY OF LUMPECTOMY: ICD-10-CM

## 2021-07-06 PROCEDURE — 77062 BREAST TOMOSYNTHESIS BI: CPT

## 2021-08-02 ENCOUNTER — HOSPITAL ENCOUNTER (OUTPATIENT)
Dept: CT IMAGING | Age: 71
Discharge: HOME | End: 2021-08-02
Attending: INTERNAL MEDICINE
Payer: MEDICARE

## 2021-08-02 DIAGNOSIS — R91.8 LUNG MASS: ICD-10-CM

## 2021-08-02 DIAGNOSIS — R91.1 LUNG NODULE: ICD-10-CM

## 2021-08-02 PROCEDURE — 71250 CT THORAX DX C-: CPT

## 2021-08-03 PROBLEM — J44.9 COPD (CHRONIC OBSTRUCTIVE PULMONARY DISEASE) (HCC): Status: RESOLVED | Noted: 2020-09-24 | Resolved: 2021-08-03

## 2021-08-03 PROBLEM — M19.90 OSTEOARTHRITIS: Status: RESOLVED | Noted: 2020-09-22 | Resolved: 2021-08-03

## 2021-08-11 DIAGNOSIS — E11.21 TYPE 2 DIABETES WITH NEPHROPATHY (HCC): ICD-10-CM

## 2021-08-19 ENCOUNTER — OFFICE VISIT (OUTPATIENT)
Dept: FAMILY MEDICINE CLINIC | Age: 71
End: 2021-08-19
Payer: MEDICARE

## 2021-08-19 VITALS
WEIGHT: 236 LBS | SYSTOLIC BLOOD PRESSURE: 132 MMHG | HEART RATE: 90 BPM | HEIGHT: 64 IN | BODY MASS INDEX: 40.29 KG/M2 | DIASTOLIC BLOOD PRESSURE: 80 MMHG | OXYGEN SATURATION: 97 % | TEMPERATURE: 97.7 F

## 2021-08-19 DIAGNOSIS — J44.9 CHRONIC OBSTRUCTIVE PULMONARY DISEASE, UNSPECIFIED COPD TYPE (HCC): ICD-10-CM

## 2021-08-19 DIAGNOSIS — M70.21 OLECRANON BURSITIS OF RIGHT ELBOW: ICD-10-CM

## 2021-08-19 DIAGNOSIS — E11.21 TYPE 2 DIABETES WITH NEPHROPATHY (HCC): Primary | ICD-10-CM

## 2021-08-19 DIAGNOSIS — E03.9 ACQUIRED HYPOTHYROIDISM: ICD-10-CM

## 2021-08-19 DIAGNOSIS — M19.90 ARTHRITIS: ICD-10-CM

## 2021-08-19 PROCEDURE — 2022F DILAT RTA XM EVC RTNOPTHY: CPT | Performed by: FAMILY MEDICINE

## 2021-08-19 PROCEDURE — G8427 DOCREV CUR MEDS BY ELIG CLIN: HCPCS | Performed by: FAMILY MEDICINE

## 2021-08-19 PROCEDURE — G8536 NO DOC ELDER MAL SCRN: HCPCS | Performed by: FAMILY MEDICINE

## 2021-08-19 PROCEDURE — G8752 SYS BP LESS 140: HCPCS | Performed by: FAMILY MEDICINE

## 2021-08-19 PROCEDURE — 1101F PT FALLS ASSESS-DOCD LE1/YR: CPT | Performed by: FAMILY MEDICINE

## 2021-08-19 PROCEDURE — G9899 SCRN MAM PERF RSLTS DOC: HCPCS | Performed by: FAMILY MEDICINE

## 2021-08-19 PROCEDURE — 3017F COLORECTAL CA SCREEN DOC REV: CPT | Performed by: FAMILY MEDICINE

## 2021-08-19 PROCEDURE — G9717 DOC PT DX DEP/BP F/U NT REQ: HCPCS | Performed by: FAMILY MEDICINE

## 2021-08-19 PROCEDURE — 99213 OFFICE O/P EST LOW 20 MIN: CPT | Performed by: FAMILY MEDICINE

## 2021-08-19 PROCEDURE — G8754 DIAS BP LESS 90: HCPCS | Performed by: FAMILY MEDICINE

## 2021-08-19 PROCEDURE — 3044F HG A1C LEVEL LT 7.0%: CPT | Performed by: FAMILY MEDICINE

## 2021-08-19 PROCEDURE — 1090F PRES/ABSN URINE INCON ASSESS: CPT | Performed by: FAMILY MEDICINE

## 2021-08-19 PROCEDURE — G8417 CALC BMI ABV UP PARAM F/U: HCPCS | Performed by: FAMILY MEDICINE

## 2021-08-19 PROCEDURE — G8399 PT W/DXA RESULTS DOCUMENT: HCPCS | Performed by: FAMILY MEDICINE

## 2021-08-19 RX ORDER — FLUTICASONE PROPIONATE AND SALMETEROL 50; 250 UG/1; UG/1
POWDER RESPIRATORY (INHALATION)
COMMUNITY
Start: 2021-08-13 | End: 2021-11-18 | Stop reason: ALTCHOICE

## 2021-08-19 RX ORDER — METHYLPREDNISOLONE 4 MG/1
TABLET ORAL
Qty: 1 DOSE PACK | Refills: 0 | Status: SHIPPED | OUTPATIENT
Start: 2021-08-19 | End: 2021-11-18 | Stop reason: ALTCHOICE

## 2021-08-19 NOTE — PROGRESS NOTES
IDENTIFYING INFORMATION:      Gilma Tijerina , 65 y.o., female  [address],  [address] [facility]     Medical Record Number: [id number]          CHIEF COMPLAINT:     Chief Complaint   Patient presents with    Diabetes    Hypertension         HISTORY OF PRESENT ILLNESS:    Gilma Tijerina is a 65 y.o. female  has a past medical history of Arthritis, Asthma, Cancer ([address] Chronic pain, COPD (chronic obstructive pulmonary disease) ([address] Depressive disorder (9/22/2020), Diabetes ([address] Essential hypertension (9/22/2020), GERD (gastroesophageal reflux disease), Hypothyroidism (9/22/2020), Malignant tumor of breast ([address] (9/22/2020), Osteoarthritis (9/22/2020), Pure hypercholesterolemia (9/22/2020), Sleep apnea, Thyroid disease (1980), and Type 2 diabetes mellitus without complication ([address] (2/08/1779). .  she comes in for 1 month follow up and has been doing better but not best.  Has noted some shrinking with first course of prednisone, a medrol dose pack. Has diabetes and Gin Roberts is helping stopped metformin due to diarrhea. Still has her regular arthritis pain, knees, right mostly. NOchest pain, short of breath, edema. NO fevers, chills,s weats.       PAST MEDICAL HISTORY:     Past Medical History:   Diagnosis Date    Arthritis     Asthma     Cancer ([address]     BREAST CANCER RIGHT BREAST LUMPECTOMY     Chronic pain     COPD (chronic obstructive pulmonary disease) ([address]     Depressive disorder 9/22/2020    Diabetes ([address]     Essential hypertension 9/22/2020    GERD (gastroesophageal reflux disease)     Hypothyroidism 9/22/2020    Malignant tumor of breast ([address] 9/22/2020    Osteoarthritis 9/22/2020    Pure hypercholesterolemia 9/22/2020    Sleep apnea     Thyroid disease 1980    REMOVED     Type 2 diabetes mellitus without complication ([address] 7/88/9793       MEDICATIONS:     Current Outpatient Medications on File Prior to Visit   Medication Sig Dispense Refill    Advair Diskus 250-50 mcg/dose diskus inhaler INHALE 1 PUFF BY MOUTH TWICE DAILY      SITagliptin (JANUVIA) 100 mg tablet Take 1 Tablet by mouth daily. Stop janumet-we are stopping metformin altogether 90 Tablet 3    meloxicam (MOBIC) 15 mg tablet TAKE 1 TABLET BY MOUTH EVERY DAY 90 Tablet 3    levothyroxine (SYNTHROID) 100 mcg tablet Take 1 Tablet by mouth Daily (before breakfast). 90 Tablet 1    rosuvastatin (CRESTOR) 20 mg tablet Take 1 Tablet by mouth nightly. 90 Tablet 1    Breztri Aerosphere 160-9-4.8 mcg/actuation HFAA INHALE 2 PUFFS BY MOUTH TWICE DAILY.  traMADoL (ULTRAM) 50 mg tablet Take 50 mg by mouth every six (6) hours as needed for Pain.  traZODone (DESYREL) 100 mg tablet Take 1 Tab by mouth nightly. 90 Tab 3    furosemide (LASIX) 20 mg tablet Take 1 Tab by mouth daily. 90 Tab 2    pantoprazole (PROTONIX) 40 mg tablet Take 1 Tab by mouth daily. 90 Tab 3    venlafaxine (EFFEXOR) 75 mg tablet Take 75 mg by mouth daily.  anastrozole (ARIMIDEX) 1 mg tablet Take 1 mg by mouth daily.  aspirin delayed-release 81 mg tablet Take 81 mg by mouth daily.  omega-3 fatty acids/dha/epa (MEGARED PLANT-OMEGA-3 PO) Take 800 mg by mouth daily.  vitamin E (AQUA GEMS) 400 unit capsule Take 400 Units by mouth daily.  cholecalciferol (Vitamin D3) (5000 Units/125 mcg) tab tablet Take 5,000 Units by mouth daily.  Lactobacillus rhamnosus GG (CULTURELLE PO) Take 1 Tab by mouth daily.  methylPREDNISolone (MEDROL DOSEPACK) 4 mg tablet One dose pack orally as directed until gone (Patient not taking: Reported on 8/19/2021) 1 Dose Pack 0     No current facility-administered medications on file prior to visit.        ALLERGIES:    Allergies   Allergen Reactions    Ampicillin Unknown (comments)    Erythromycin Base Unknown (comments)    Other Medication Other (comments)     TUBERCULOSIS  PT GETS CELLULITIS            Parafon Forte Dsc [Chlorzoxazone] Unknown (comments)         SOCIAL HISTORY: Social History     Tobacco Use    Smoking status: Current Every Day Smoker     Packs/day: 1.00     Years: 50.00     Pack years: 50.00    Smokeless tobacco: Never Used   Substance Use Topics    Alcohol use: Not Currently    Drug use: Never       SURGICAL HISTORY:    Past Surgical History:   Procedure Laterality Date    HX CHOLECYSTECTOMY  2013    HX COLONOSCOPY  10/20/2017    HX COLONOSCOPY  2014    HX COLONOSCOPY  2007    HX HEENT      HX HYMENECTOMY      HX ORTHOPAEDIC Left 07/07/2020    left rotator cuff    [place] BREAST SURGERY PROCEDURE UNLISTED Right 02/2016    LUMPECTOMY     VT MASTECTOMY, PARTIAL Right 2016        FAMILY HISTORY:    Family History   Problem Relation Age of Onset    Thyroid Disease Mother     Heart Disease Mother     Hypertension Mother     Heart Attack Mother     Cancer Father         SPINE     Other Father         COMMITTED SUICIDE     Diabetes Brother     Heart Disease Brother     Diabetes Sister     Cancer Sister     Diabetes Brother     Heart Disease Brother     Diabetes Brother     Cancer Brother     Lung Disease Brother     Anesth Problems Neg Hx          REVIEW OF SYSTEMS:    I personally collected this information from all available source present (patient/others in room and records available) -JLEWISDO    Review of Systems   Constitutional: Negative for chills, diaphoresis and fever. Respiratory: Positive for cough (off and on). Negative for shortness of breath and wheezing. Cardiovascular: Negative for chest pain and palpitations. Gastrointestinal: Negative for abdominal pain, constipation, diarrhea, heartburn, nausea and vomiting. Genitourinary: Negative for dysuria and urgency. Musculoskeletal: Positive for back pain, joint pain and myalgias. Negative for neck pain. Skin: Negative for itching and rash.            PHYSICAL EXAMINATION:    Vital Signs:    Visit Vitals  /80 (BP 1 Location: Left upper arm, BP Patient Position: Sitting) Pulse 90   Temp 97.7 °F (36.5 °C) (Temporal)   Ht 5' 4\" (1.626 m)   Wt 236 lb (107 kg)   SpO2 97%   BMI 40.51 kg/m²         Wt Readings from Last 3 Encounters:   08/19/21 236 lb (107 kg)   06/28/21 218 lb (98.9 kg)   05/20/21 213 lb (96.6 kg)     BP Readings from Last 3 Encounters:   08/19/21 132/80   06/28/21 134/81   05/20/21 (!) 153/98         Physical Exam  Constitutional:       Appearance: She is obese. She is not ill-appearing or toxic-appearing. Eyes:      General: No scleral icterus. Extraocular Movements: Extraocular movements intact. Conjunctiva/sclera: Conjunctivae normal.   Cardiovascular:      Rate and Rhythm: Normal rate and regular rhythm. Heart sounds: No murmur heard. No friction rub. No gallop. Pulmonary:      Effort: Pulmonary effort is normal.      Breath sounds: Normal breath sounds. No wheezing, rhonchi or rales. Abdominal:      Palpations: Abdomen is soft. Tenderness: There is no abdominal tenderness. There is no guarding. Musculoskeletal:         General: Swelling and deformity present. Cervical back: No tenderness. Right lower leg: No edema. Left lower leg: No edema. Comments: Right olecrenon bursa is swollen, looks swollen, NO erythema and no tenderness  Knee swollen right, painful to palpation laterally and crepitant ROM   Lymphadenopathy:      Cervical: No cervical adenopathy. Skin:     Coloration: Skin is not jaundiced. Findings: No erythema or rash. Neurological:      General: No focal deficit present. Mental Status: She is alert and oriented to person, place, and time. Mental status is at baseline. Psychiatric:         Mood and Affect: Mood normal.         Behavior: Behavior normal.         Thought Content: Thought content normal.         Judgment: Judgment normal.           ASSESSMENT/PLAN:      Discussion (regarding today's visit with Ned Alvares);    Bursitis may respond to another course of methylprednisolone   Could continue to monitor and see orthopedics   Could actually drain with a larger gauge needle   Patient opted to do the prednisone Dosepak and follow-up and treat as indicated   Routine health maintenance/labs in 3 months. ICD-10-CM ICD-9-CM    1. Type 2 diabetes with nephropathy (HCC)  E11.21 250.40      583.81    2. Olecranon bursitis of right elbow  M70.21 726.33 methylPREDNISolone (MEDROL DOSEPACK) 4 mg tablet   3. Arthritis  M19.90 716.90 methylPREDNISolone (MEDROL DOSEPACK) 4 mg tablet   4. Chronic obstructive pulmonary disease, unspecified COPD type ([facility]  J44.9 496    5. Acquired hypothyroidism  E03.9 244.9         WE reviewed medications, treatment, testing such as labs, imagine, referrals and when to call regarding results and appointments.  Reminded patient to keep any and all appointments with specialists, labs, imaging.  Reminded patient to make sure we get copies of any specialists care, labs and imaging.  Reminded patient to call of come by the office if there are any concerns, questions , comments or problems.  The patient verbalized understanding of the care plan and all questions were answered to the patient's satisfaction prior to leaving the office.  The patient was told that failure to comply with recommended testing could result in abnormal health consequences.  The patient was instructed to have yearly routine health maintenance including but not limited to age appropriate vaccines, testing, screening exams.  ALL questions were answered to her satisfaction before leaving the office. The patient actively participated in medical decision making. FOLLOW UP:     Patient knows to keep any and all future visits scheduled unless told otherwise. Patient knows to call, come back if any concerns, questions, comments or problems arise.      Follow-up and Dispositions    · Return in about 3 months (around 11/19/2021) for follow up arthritis, olecrenon bursitis, arhtritis, copd, diabetes and needs labs. Fareed Harrell DO    This visit was reviewed and signed electronically. It was been completed with voice recognition software and hand typing. It may have syntax and spelling errors despite editing.

## 2021-08-19 NOTE — PROGRESS NOTES
1. Have you been to the ER, urgent care clinic since your last visit? Hospitalized since your last visit? No    2. Have you seen or consulted any other health care providers outside of the [address] [place] [address] since your last visit? Include any pap smears or colon screening.  Dermatologist    Chief Complaint   Patient presents with    Diabetes    Hypertension     Visit Vitals  /80 (BP 1 Location: Left upper arm, BP Patient Position: Sitting)   Pulse 90   Temp 97.7 °F (36.5 °C) (Temporal)   Ht 5' 4\" (1.626 m)   Wt 236 lb (107 kg)   SpO2 97%   BMI 40.51 kg/m²

## 2021-08-19 NOTE — PATIENT INSTRUCTIONS
General Health and concerns:  HEART HEALTHY DIET:  A heart healthy diet is one that is low in cholesterol (less than 300 mg daily), fat (less than 80 g daily) . You should also minimize carbohydrates / sugars (less amounts of breads, pastas, potato and potato products and sugary foods/snacks, cookies, cakes, etc) . Try to eat whole wheat/multigrain breads and pastas and eat more vegetables. Cook with olive oil (or no oil) and grill, bake, broil or boil foods. Less red meat and more chicken , fish and lean cuts of beef (limited). 8364-5485 calories per day is sufficient 9477-3687 is acceptable for weight loss. EXERCISE:  You should do exercise 3-5 days per week (minimum) to include increasing your heart rate for 30 to 45 minutes. At least a pace of a brisk walk should do that. This build up your heart and lung endurance and muscles and helps many function of the body. OTHER:    IF your condition(s) do not improve, get worse and/or if any concerns arise, please call or come by the office. Routine Health maintenance: You need to get a yearly follow up/physical exam to review, discuss age and gender appropriate exams, labs, vaccines and screening tests. This includes cardiovascular health risk, cancer screens and other tim related topics. Medications-Take all medications as directed. Please do not stop unless you talk to your doctor or health care provider first. Report any problems immediately. Referrals: if you have been given a referral, please call the office if you do not hear from provider in one week. You may make the appointment yourself. Please keep all appointments with specialists and ask them to send their notes, thoughts, recommendations to us , as your PCP. KEEP all upcoming appointments with our office UNLESS otherwise and specifically told not to.     CHECK your diagnosis/problem list for today and that orders and prescriptions are what we discussed as well as MAKE sure all information is accurate and has been discussed to your satisfaction. PLEASE make sure all your questions have been answered and feel free to call or come back should any concerns arise. Imaging/Labs:  Be sure to get these images in a timely manner. IF your test must be scheduled, let us know if you need help getting this done and if you do not hear from that provider in a week , call us or them. BE SURE to call the office if you do not hear regarding the results in one week after the test is performed Image or lab). It is our intention to inform you of the results ALWAYS, even if normal you should get a notification (Call, portal message). PLEASE juliana if you do not get the results. PLEASE follow all recommendations and call/come in /ask questions if you do not understand of if problems develop after or in between visits. Failure to comply with recommended health care advise could result in serious health consequences. Thank you for choosing our practice and please let us know how we can help you feel better and stay well!

## 2021-09-12 DIAGNOSIS — K21.9 GASTROESOPHAGEAL REFLUX DISEASE WITHOUT ESOPHAGITIS: ICD-10-CM

## 2021-09-13 RX ORDER — PANTOPRAZOLE SODIUM 40 MG/1
TABLET, DELAYED RELEASE ORAL
Qty: 90 TABLET | Refills: 3 | Status: SHIPPED | OUTPATIENT
Start: 2021-09-13 | End: 2022-09-07 | Stop reason: SDUPTHER

## 2021-10-12 DIAGNOSIS — I10 ESSENTIAL HYPERTENSION: ICD-10-CM

## 2021-10-12 RX ORDER — FUROSEMIDE 20 MG/1
TABLET ORAL
Qty: 90 TABLET | Refills: 2 | Status: SHIPPED | OUTPATIENT
Start: 2021-10-12 | End: 2021-11-18 | Stop reason: ALTCHOICE

## 2021-11-18 ENCOUNTER — OFFICE VISIT (OUTPATIENT)
Dept: FAMILY MEDICINE CLINIC | Age: 71
End: 2021-11-18
Payer: MEDICARE

## 2021-11-18 VITALS
DIASTOLIC BLOOD PRESSURE: 70 MMHG | WEIGHT: 250.4 LBS | HEART RATE: 91 BPM | SYSTOLIC BLOOD PRESSURE: 142 MMHG | TEMPERATURE: 98.2 F | HEIGHT: 64 IN | OXYGEN SATURATION: 95 % | BODY MASS INDEX: 42.75 KG/M2

## 2021-11-18 DIAGNOSIS — J44.9 CHRONIC OBSTRUCTIVE PULMONARY DISEASE, UNSPECIFIED COPD TYPE (HCC): ICD-10-CM

## 2021-11-18 DIAGNOSIS — E03.9 ACQUIRED HYPOTHYROIDISM: ICD-10-CM

## 2021-11-18 DIAGNOSIS — I10 ESSENTIAL HYPERTENSION: Primary | ICD-10-CM

## 2021-11-18 DIAGNOSIS — M19.90 ARTHRITIS: ICD-10-CM

## 2021-11-18 DIAGNOSIS — E11.21 TYPE 2 DIABETES WITH NEPHROPATHY (HCC): ICD-10-CM

## 2021-11-18 DIAGNOSIS — K21.9 GASTROESOPHAGEAL REFLUX DISEASE WITHOUT ESOPHAGITIS: ICD-10-CM

## 2021-11-18 PROCEDURE — G8417 CALC BMI ABV UP PARAM F/U: HCPCS | Performed by: FAMILY MEDICINE

## 2021-11-18 PROCEDURE — 3017F COLORECTAL CA SCREEN DOC REV: CPT | Performed by: FAMILY MEDICINE

## 2021-11-18 PROCEDURE — 99214 OFFICE O/P EST MOD 30 MIN: CPT | Performed by: FAMILY MEDICINE

## 2021-11-18 PROCEDURE — G9899 SCRN MAM PERF RSLTS DOC: HCPCS | Performed by: FAMILY MEDICINE

## 2021-11-18 PROCEDURE — 2022F DILAT RTA XM EVC RTNOPTHY: CPT | Performed by: FAMILY MEDICINE

## 2021-11-18 PROCEDURE — 1090F PRES/ABSN URINE INCON ASSESS: CPT | Performed by: FAMILY MEDICINE

## 2021-11-18 PROCEDURE — G8536 NO DOC ELDER MAL SCRN: HCPCS | Performed by: FAMILY MEDICINE

## 2021-11-18 PROCEDURE — G8427 DOCREV CUR MEDS BY ELIG CLIN: HCPCS | Performed by: FAMILY MEDICINE

## 2021-11-18 PROCEDURE — G8754 DIAS BP LESS 90: HCPCS | Performed by: FAMILY MEDICINE

## 2021-11-18 PROCEDURE — G9717 DOC PT DX DEP/BP F/U NT REQ: HCPCS | Performed by: FAMILY MEDICINE

## 2021-11-18 PROCEDURE — G8399 PT W/DXA RESULTS DOCUMENT: HCPCS | Performed by: FAMILY MEDICINE

## 2021-11-18 PROCEDURE — G8753 SYS BP > OR = 140: HCPCS | Performed by: FAMILY MEDICINE

## 2021-11-18 PROCEDURE — 1101F PT FALLS ASSESS-DOCD LE1/YR: CPT | Performed by: FAMILY MEDICINE

## 2021-11-18 PROCEDURE — 3044F HG A1C LEVEL LT 7.0%: CPT | Performed by: FAMILY MEDICINE

## 2021-11-18 RX ORDER — FUROSEMIDE 40 MG/1
TABLET ORAL
Qty: 90 TABLET | Refills: 1 | Status: SHIPPED | OUTPATIENT
Start: 2021-11-18 | End: 2022-05-17 | Stop reason: SDUPTHER

## 2021-11-18 RX ORDER — FLUTICASONE FUROATE, UMECLIDINIUM BROMIDE AND VILANTEROL TRIFENATATE 100; 62.5; 25 UG/1; UG/1; UG/1
POWDER RESPIRATORY (INHALATION)
COMMUNITY
Start: 2021-10-28

## 2021-11-18 NOTE — PATIENT INSTRUCTIONS
General Health and concerns:  HEART HEALTHY DIET:  A heart healthy diet is one that is low in cholesterol (less than 300 mg daily), fat (less than 80 g daily) . You should also minimize carbohydrates / sugars (less amounts of breads, pastas, potato and potato products and sugary foods/snacks, cookies, cakes, etc) . Try to eat whole wheat/multigrain breads and pastas and eat more vegetables. Cook with olive oil (or no oil) and grill, bake, broil or boil foods. Less red meat and more chicken , fish and lean cuts of beef (limited). 2396-8493 calories per day is sufficient 6222-2714 is acceptable for weight loss. EXERCISE:  You should do exercise 3-5 days per week (minimum) to include increasing your heart rate for 30 to 45 minutes. At least a pace of a brisk walk should do that. This build up your heart and lung endurance and muscles and helps many function of the body. OTHER:    IF your condition(s) do not improve, get worse and/or if any concerns arise, please call or come by the office. Routine Health maintenance: You need to get a yearly follow up/physical exam to review, discuss age and gender appropriate exams, labs, vaccines and screening tests. This includes cardiovascular health risk, cancer screens and other tim related topics. Medications-Take all medications as directed. Please do not stop unless you talk to your doctor or health care provider first. Report any problems immediately. PLEASE CHECK THE LIST FROM TODAY's VISIT and make SURE IT IS ACCURATE-Please bring any issues to our concern IMMEDIATELY!! Referrals: if you have been given a referral, please call the office if you do not hear from provider in one week. You may make the appointment yourself. Please keep all appointments with specialists and ask them to send their notes, thoughts, recommendations to us , as your PCP.     KEEP all upcoming appointments with our office UNLESS otherwise and specifically told not to. CHECK your diagnosis/problem list for today and that orders and prescriptions are what we discussed as well as MAKE sure all information is accurate and has been discussed to your satisfaction. PLEASE make sure all your questions have been answered and feel free to call or come back should any concerns arise. Imaging/Labs:  Be sure to get these images in a timely manner. IF your test must be scheduled, let us know if you need help getting this done and if you do not hear from that provider in a week , call us or them. BE SURE to call the office if you do not hear regarding the results in one week after the test is performed Image or lab). It is our intention to inform you of the results ALWAYS, even if normal you should get a notification (Call, portal message). PLEASE juliana if you do not get the results. PLEASE follow all recommendations and call/come in /ask questions if you do not understand of if problems develop after or in between visits. Failure to comply with recommended health care advise could result in serious health consequences. Thank you for choosing our practice and please let us know how we can help you feel better and stay well!

## 2021-11-18 NOTE — PROGRESS NOTES
1. Have you been to the ER, urgent care clinic since your last visit? Hospitalized since your last visit? No    2. Have you seen or consulted any other health care providers outside of the [address] [place] [address] since your last visit? Include any pap smears or colon screening.  Dr. Yolanda Ortiz    Chief Complaint   Patient presents with    COPD    Diabetes    Arthritis    Other     has upcoming appt for cataract surgery     Visit Vitals  BP (!) 162/74 (BP 1 Location: Left upper arm, BP Patient Position: Sitting)   Pulse 92   Temp 98.2 °F (36.8 °C) (Temporal)   Ht 5' 4\" (1.626 m)   Wt 250 lb 6.4 oz (113.6 kg)   SpO2 95%   BMI 42.98 kg/m²

## 2021-11-18 NOTE — PROGRESS NOTES
IDENTIFYING INFORMATION:      Demi Sims , 65 y.o., female  [address],  [address] [facility]     Medical Record Number: [id number]    E and M CODING:  ESTABLISH      Patient Active Problem List   Diagnosis Code    Nontraumatic complete tear of left rotator cuff M75.122    GERD (gastroesophageal reflux disease) K21.9    Arthritis M19.90    Depressive disorder F32.9    Essential hypertension I10    Hypothyroidism E03.9    Malignant tumor of breast ([address] C50.919    Pure hypercholesterolemia E78.00    Type 2 diabetes mellitus without complication ([address] Y46.2    Severe obesity ([address] E66.01    Type 2 diabetes with nephropathy ([address] E11.21    Screening for alcoholism Z13.39    Screening for depression Z13.31    Chronic obstructive pulmonary disease ([address] J44.9           CHIEF COMPLAINT:   Chief Complaint   Patient presents with    COPD    Diabetes    Arthritis    Other     has upcoming appt for cataract surgery         HISTORY OF PRESENT ILLNESS:    Demi Sims is a 65 y.o. female . she comes in for 3 month follow up. Has some cataract surgery, left then right starting in January. Only drives during day. Has olecrenon bursitis, still swollen, no pain, no erythema. Slowly shrinking.       PAST MEDICAL HISTORY:     Past Medical History:   Diagnosis Date    Arthritis     Asthma     Cancer ([address]     BREAST CANCER RIGHT BREAST LUMPECTOMY     Chronic pain     COPD (chronic obstructive pulmonary disease) ([address]     Depressive disorder 9/22/2020    Diabetes ([address]     Essential hypertension 9/22/2020    GERD (gastroesophageal reflux disease)     Hypothyroidism 9/22/2020    Malignant tumor of breast ([address] 9/22/2020    Osteoarthritis 9/22/2020    Pure hypercholesterolemia 9/22/2020    Sleep apnea     Thyroid disease 1980    REMOVED     Type 2 diabetes mellitus without complication ([address] 5/67/0611       MEDICATIONS:     Current Outpatient Medications on File Prior to Visit Medication Sig Dispense Refill    Trelegy Ellipta 100-62.5-25 mcg inhaler INHALE 1 PUFF BY MOUTH EVERY DAY      furosemide (LASIX) 20 mg tablet TAKE 1 TABLET BY MOUTH DAILY 90 Tablet 2    pantoprazole (PROTONIX) 40 mg tablet TAKE 1 TABLET BY MOUTH EVERY DAY 90 Tablet 3    SITagliptin (JANUVIA) 100 mg tablet Take 1 Tablet by mouth daily. Stop janumet-we are stopping metformin altogether 90 Tablet 3    meloxicam (MOBIC) 15 mg tablet TAKE 1 TABLET BY MOUTH EVERY DAY 90 Tablet 3    levothyroxine (SYNTHROID) 100 mcg tablet Take 1 Tablet by mouth Daily (before breakfast). 90 Tablet 1    rosuvastatin (CRESTOR) 20 mg tablet Take 1 Tablet by mouth nightly. 90 Tablet 1    traMADoL (ULTRAM) 50 mg tablet Take 50 mg by mouth every six (6) hours as needed for Pain.  traZODone (DESYREL) 100 mg tablet Take 1 Tab by mouth nightly. 90 Tab 3    venlafaxine (EFFEXOR) 75 mg tablet Take 75 mg by mouth daily.  anastrozole (ARIMIDEX) 1 mg tablet Take 1 mg by mouth daily.  aspirin delayed-release 81 mg tablet Take 81 mg by mouth daily.  omega-3 fatty acids/dha/epa (MEGARED PLANT-OMEGA-3 PO) Take 800 mg by mouth daily.  vitamin E (AQUA GEMS) 400 unit capsule Take 400 Units by mouth daily.  cholecalciferol (Vitamin D3) (5000 Units/125 mcg) tab tablet Take 5,000 Units by mouth daily.  Lactobacillus rhamnosus GG (CULTURELLE PO) Take 1 Tab by mouth daily.  Advair Diskus 250-50 mcg/dose diskus inhaler INHALE 1 PUFF BY MOUTH TWICE DAILY (Patient not taking: Reported on 11/18/2021)      methylPREDNISolone (MEDROL DOSEPACK) 4 mg tablet Take one dose pack orally as directed until gone (Patient not taking: Reported on 11/18/2021) 1 Dose Pack 0    methylPREDNISolone (MEDROL DOSEPACK) 4 mg tablet One dose pack orally as directed until gone (Patient not taking: Reported on 8/19/2021) 1 Dose Pack 0    Breztri Aerosphere 160-9-4.8 mcg/actuation HFAA INHALE 2 PUFFS BY MOUTH TWICE DAILY.  (Patient not taking: Reported on 11/18/2021)       No current facility-administered medications on file prior to visit. ALLERGIES:    Allergies   Allergen Reactions    Ampicillin Unknown (comments)    Erythromycin Base Unknown (comments)    Other Medication Other (comments)     TUBERCULOSIS  PT GETS CELLULITIS            Parafon Forte Dsc [Chlorzoxazone] Unknown (comments)         SOCIAL HISTORY:     Social History     Socioeconomic History    Marital status:    Tobacco Use    Smoking status: Current Every Day Smoker     Packs/day: 1.00     Years: 50.00     Pack years: 50.00    Smokeless tobacco: Never Used   Substance and Sexual Activity    Alcohol use: Not Currently    Drug use: Never    Sexual activity: Yes     Partners: Male         SURGICAL HISTORY:    Past Surgical History:   Procedure Laterality Date    HX CHOLECYSTECTOMY  2013    HX COLONOSCOPY  10/20/2017    HX COLONOSCOPY  2014    HX COLONOSCOPY  2007    HX HEENT      HX HYMENECTOMY      HX ORTHOPAEDIC Left 07/07/2020    left rotator cuff    [place] BREAST SURGERY PROCEDURE UNLISTED Right 02/2016    LUMPECTOMY     [place] MASTECTOMY, PARTIAL Right 2016        FAMILY HISTORY:    Family History   Problem Relation Age of Onset    Thyroid Disease Mother     Heart Disease Mother     Hypertension Mother     Heart Attack Mother     Cancer Father         SPINE     Other Father         COMMITTED SUICIDE     Diabetes Brother     Heart Disease Brother     Diabetes Sister     Cancer Sister     Diabetes Brother     Heart Disease Brother     Diabetes Brother     Cancer Brother     Lung Disease Brother     Anesth Problems Neg Hx          REVIEW OF SYSTEMS:    I personally collected this information from all available source present (patient/others in room and records available) -[email or web address]  Review of Systems   Constitutional: Positive for chills (hot flashes from anti hormone due to breast cancer). Negative for diaphoresis and fever.    HENT: Positive for congestion (some in morning). Negative for sinus pain, sore throat and tinnitus. Eyes: Negative for blurred vision, double vision and photophobia. Respiratory: Positive for cough (some in morning. ). Negative for shortness of breath and wheezing. Cardiovascular: Negative for chest pain, palpitations, orthopnea, leg swelling and PND. Gastrointestinal: Negative for abdominal pain, constipation, diarrhea, nausea and vomiting. Genitourinary: Negative for dysuria, frequency and urgency. Musculoskeletal: Positive for back pain and joint pain. Negative for myalgias and neck pain. Neurological: Negative for dizziness, tingling, sensory change, weakness and headaches. PHYSICAL EXAMINATION:    Vital Signs:    Visit Vitals  BP (!) 142/70 (BP 1 Location: Left upper arm, BP Patient Position: Sitting)   Pulse 91   Temp 98.2 °F (36.8 °C) (Temporal)   Ht 5' 4\" (1.626 m)   Wt 250 lb 6.4 oz (113.6 kg)   SpO2 95%   BMI 42.98 kg/m²         Wt Readings from Last 3 Encounters:   11/18/21 250 lb 6.4 oz (113.6 kg)   08/19/21 236 lb (107 kg)   06/28/21 218 lb (98.9 kg)     BP Readings from Last 3 Encounters:   11/18/21 (!) 142/70   08/19/21 132/80   06/28/21 134/81         Physical Exam  Nursing note reviewed. Constitutional:       General: She is not in acute distress. Appearance: Normal appearance. She is obese. She is not ill-appearing or toxic-appearing. HENT:      Right Ear: Tympanic membrane, ear canal and external ear normal.      Left Ear: Tympanic membrane, ear canal and external ear normal.      Nose: No congestion or rhinorrhea. Mouth/Throat:      Pharynx: No oropharyngeal exudate or posterior oropharyngeal erythema. Eyes:      General: No scleral icterus. Extraocular Movements: Extraocular movements intact. Conjunctiva/sclera: Conjunctivae normal.      Pupils: Pupils are equal, round, and reactive to light. Neck:      Vascular: No carotid bruit.    Cardiovascular: Rate and Rhythm: Normal rate and regular rhythm. Heart sounds: No murmur heard. No friction rub. No gallop. Pulmonary:      Effort: Pulmonary effort is normal.      Breath sounds: Normal breath sounds. No wheezing, rhonchi or rales. Musculoskeletal:         General: Deformity (right olecrenon bursa is swollen, mobile, non tender) present. Cervical back: No rigidity. No muscular tenderness. Right lower leg: No edema. Left lower leg: No edema. Lymphadenopathy:      Cervical: No cervical adenopathy. Skin:     Coloration: Skin is not jaundiced. Findings: No erythema or rash. Neurological:      General: No focal deficit present. Mental Status: She is alert and oriented to person, place, and time. Mental status is at baseline. Psychiatric:         Mood and Affect: Mood normal.         Behavior: Behavior normal.         Thought Content: Thought content normal.         Judgment: Judgment normal.           ASSESSMENT/PLAN:       Patient I discussed her situations and concerns.  She will continue take Tylenol for her pain.  Did increase her furosemide to 40 mg daily.  She will need to come back in spring and get some lab work.  Otherwise, she may continue medications as prescribed.  Reminded to be active as all time as possible    ICD-10-CM ICD-9-CM    1. Essential hypertension  I10 401.9    2. Type 2 diabetes with nephropathy (HCC)  E11.21 250.40      583.81    3. Arthritis  M19.90 716.90    4. Chronic obstructive pulmonary disease, unspecified COPD type ([facility]  J44.9 496    5. Acquired hypothyroidism  E03.9 244.9    6. Gastroesophageal reflux disease without esophagitis  K21.9 530.81      Discussion (regarding today's visit with Verner England);   WE gave the patient a review of medications, treatment, testing such as labs, imagine, referrals and when to call regarding results and appointments.     Reminded patient to keep any and all appointments with specialists, labs, imaging.  Reminded patient to make sure we get copies of any specialists care, labs and imaging.  Reminded patient to call of come by the office if there are any concerns, questions , comments or problems.  The patient verbalized understanding of the care plan and all questions were answered to the patient's satisfaction prior to leaving the office.  The patient was told that failure to comply with recommended testing could result in abnormal health consequences.  The patient was instructed to have yearly routine health maintenance including but not limited to age appropriate vaccines, testing, screening exams.  ALL questions were answered to her satisfaction before leaving the office. The patient actively participated in medical decision making. This date I spent  16 minutes reviewing chart, previous notes, tests and interviewing and examining patients answering questions providing follow up as well as ordering tests. FOLLOW UP:     Patient knows to keep any and all future visits scheduled unless told otherwise. Patient knows to call, come back if any concerns, questions, comments or problems arise. Signed By: Catia White DO     November 18, 2021     TIME: 5:31 pm      This visit was reviewed and signed electronically. It was been completed with voice recognition software and hand typing. It may have syntax and spelling errors despite editing.

## 2021-12-13 ENCOUNTER — OFFICE VISIT (OUTPATIENT)
Dept: FAMILY MEDICINE CLINIC | Age: 71
End: 2021-12-13
Payer: MEDICARE

## 2021-12-13 VITALS
HEIGHT: 64 IN | SYSTOLIC BLOOD PRESSURE: 163 MMHG | TEMPERATURE: 97.5 F | DIASTOLIC BLOOD PRESSURE: 79 MMHG | WEIGHT: 249 LBS | OXYGEN SATURATION: 95 % | BODY MASS INDEX: 42.51 KG/M2 | HEART RATE: 98 BPM

## 2021-12-13 DIAGNOSIS — I10 ESSENTIAL HYPERTENSION: ICD-10-CM

## 2021-12-13 DIAGNOSIS — H26.9 CATARACT, UNSPECIFIED CATARACT TYPE, UNSPECIFIED LATERALITY: ICD-10-CM

## 2021-12-13 DIAGNOSIS — Z01.818 PRE-OP EVALUATION: Primary | ICD-10-CM

## 2021-12-13 DIAGNOSIS — E03.9 ACQUIRED HYPOTHYROIDISM: ICD-10-CM

## 2021-12-13 DIAGNOSIS — J44.9 CHRONIC OBSTRUCTIVE PULMONARY DISEASE, UNSPECIFIED COPD TYPE (HCC): ICD-10-CM

## 2021-12-13 DIAGNOSIS — K21.9 GASTROESOPHAGEAL REFLUX DISEASE WITHOUT ESOPHAGITIS: ICD-10-CM

## 2021-12-13 DIAGNOSIS — E11.21 TYPE 2 DIABETES WITH NEPHROPATHY (HCC): ICD-10-CM

## 2021-12-13 PROCEDURE — G9717 DOC PT DX DEP/BP F/U NT REQ: HCPCS | Performed by: FAMILY MEDICINE

## 2021-12-13 PROCEDURE — G9899 SCRN MAM PERF RSLTS DOC: HCPCS | Performed by: FAMILY MEDICINE

## 2021-12-13 PROCEDURE — 1101F PT FALLS ASSESS-DOCD LE1/YR: CPT | Performed by: FAMILY MEDICINE

## 2021-12-13 PROCEDURE — 2022F DILAT RTA XM EVC RTNOPTHY: CPT | Performed by: FAMILY MEDICINE

## 2021-12-13 PROCEDURE — G8753 SYS BP > OR = 140: HCPCS | Performed by: FAMILY MEDICINE

## 2021-12-13 PROCEDURE — 99214 OFFICE O/P EST MOD 30 MIN: CPT | Performed by: FAMILY MEDICINE

## 2021-12-13 PROCEDURE — G8754 DIAS BP LESS 90: HCPCS | Performed by: FAMILY MEDICINE

## 2021-12-13 PROCEDURE — G8427 DOCREV CUR MEDS BY ELIG CLIN: HCPCS | Performed by: FAMILY MEDICINE

## 2021-12-13 PROCEDURE — 1090F PRES/ABSN URINE INCON ASSESS: CPT | Performed by: FAMILY MEDICINE

## 2021-12-13 PROCEDURE — G8399 PT W/DXA RESULTS DOCUMENT: HCPCS | Performed by: FAMILY MEDICINE

## 2021-12-13 PROCEDURE — 3044F HG A1C LEVEL LT 7.0%: CPT | Performed by: FAMILY MEDICINE

## 2021-12-13 PROCEDURE — 3017F COLORECTAL CA SCREEN DOC REV: CPT | Performed by: FAMILY MEDICINE

## 2021-12-13 PROCEDURE — G8536 NO DOC ELDER MAL SCRN: HCPCS | Performed by: FAMILY MEDICINE

## 2021-12-13 PROCEDURE — G8417 CALC BMI ABV UP PARAM F/U: HCPCS | Performed by: FAMILY MEDICINE

## 2021-12-13 RX ORDER — ROSUVASTATIN CALCIUM 20 MG/1
TABLET, COATED ORAL
Qty: 90 TABLET | Refills: 1 | Status: SHIPPED | OUTPATIENT
Start: 2021-12-13 | End: 2022-06-10 | Stop reason: SDUPTHER

## 2021-12-13 NOTE — PROGRESS NOTES
1. Have you been to the ER, urgent care clinic since your last visit? Hospitalized since your last visit? No    2. Have you seen or consulted any other health care providers outside of the [address] [place] [address] since your last visit? Include any pap smears or colon screening. No    Chief Complaint   Patient presents with    Pre-op Exam     has upcoming cataract surgery 12/16/21 and 1/6/22.      Visit Vitals  BP (!) 164/81 (BP 1 Location: Left upper arm, BP Patient Position: Sitting)   Pulse 96   Temp 97.5 °F (36.4 °C) (Temporal)   Ht 5' 4\" (1.626 m)   Wt 249 lb (112.9 kg)   SpO2 95%   BMI 42.74 kg/m²

## 2021-12-13 NOTE — PROGRESS NOTES
IDENTIFYING INFORMATION:      Celina Vila , 66 y.o., female  [address],  [address] [facility]     Medical Record Number: [id number]    E and M CODING:  Established      Patient Active Problem List   Diagnosis Code    Nontraumatic complete tear of left rotator cuff M75.122    GERD (gastroesophageal reflux disease) K21.9    Arthritis M19.90    Depressive disorder F32.9    Essential hypertension I10    Hypothyroidism E03.9    Malignant tumor of breast ([address] C50.919    Pure hypercholesterolemia E78.00    Type 2 diabetes mellitus without complication ([address] X56.0    Severe obesity ([address] E66.01    Type 2 diabetes with nephropathy ([address] E11.21    Screening for alcoholism Z13.39    Screening for depression Z13.31    Chronic obstructive pulmonary disease ([address] J44.9           CHIEF COMPLAINT:   Chief Complaint   Patient presents with    Pre-op Exam     has upcoming cataract surgery 12/16/21 and 1/6/22. HISTORY OF PRESENT ILLNESS:    Celina Vila is a 66 y.o. female . she comes in for Pre op for cataract surgery on 12-16 and 1-6 and she is feeling ok  Denies chest pain short of breath, edema, orthopnea, PND. Is coughing some, has copd, nothing new. CAN lie down without dyspnea or cough . Has no LO or angina.       PAST MEDICAL HISTORY:     Past Medical History:   Diagnosis Date    Arthritis     Asthma     Cancer ([address]     BREAST CANCER RIGHT BREAST LUMPECTOMY     Chronic pain     COPD (chronic obstructive pulmonary disease) ([address]     Depressive disorder 9/22/2020    Diabetes ([address]     Essential hypertension 9/22/2020    GERD (gastroesophageal reflux disease)     Hypothyroidism 9/22/2020    Malignant tumor of breast ([address] 9/22/2020    Osteoarthritis 9/22/2020    Pure hypercholesterolemia 9/22/2020    Sleep apnea     Thyroid disease 1980    REMOVED     Type 2 diabetes mellitus without complication ([address] 6/51/1735       MEDICATIONS:     Current Outpatient Medications on File Prior to Visit   Medication Sig Dispense Refill    rosuvastatin (CRESTOR) 20 mg tablet TAKE 1 TABLET BY MOUTH EVERY NIGHT 90 Tablet 1    Trelegy Ellipta 100-62.5-25 mcg inhaler INHALE 1 PUFF BY MOUTH EVERY DAY      furosemide (LASIX) 40 mg tablet One tablet daily by oral route 90 Tablet 1    pantoprazole (PROTONIX) 40 mg tablet TAKE 1 TABLET BY MOUTH EVERY DAY 90 Tablet 3    SITagliptin (JANUVIA) 100 mg tablet Take 1 Tablet by mouth daily. Stop janumet-we are stopping metformin altogether 90 Tablet 3    meloxicam (MOBIC) 15 mg tablet TAKE 1 TABLET BY MOUTH EVERY DAY 90 Tablet 3    levothyroxine (SYNTHROID) 100 mcg tablet Take 1 Tablet by mouth Daily (before breakfast). 90 Tablet 1    traMADoL (ULTRAM) 50 mg tablet Take 50 mg by mouth every six (6) hours as needed for Pain.  traZODone (DESYREL) 100 mg tablet Take 1 Tab by mouth nightly. 90 Tab 3    venlafaxine (EFFEXOR) 75 mg tablet Take 75 mg by mouth daily.  anastrozole (ARIMIDEX) 1 mg tablet Take 1 mg by mouth daily.  aspirin delayed-release 81 mg tablet Take 81 mg by mouth daily.  omega-3 fatty acids/dha/epa (MEGARED PLANT-OMEGA-3 PO) Take 800 mg by mouth daily.  vitamin E (AQUA GEMS) 400 unit capsule Take 400 Units by mouth daily.  cholecalciferol (Vitamin D3) (5000 Units/125 mcg) tab tablet Take 5,000 Units by mouth daily.  Lactobacillus rhamnosus GG (CULTURELLE PO) Take 1 Tab by mouth daily.  [DISCONTINUED] rosuvastatin (CRESTOR) 20 mg tablet Take 1 Tablet by mouth nightly. 90 Tablet 1     No current facility-administered medications on file prior to visit.        ALLERGIES:    Allergies   Allergen Reactions    Ampicillin Unknown (comments)    Erythromycin Base Unknown (comments)    Other Medication Other (comments)     TUBERCULOSIS  PT GETS CELLULITIS            Parafon Forte Dsc [Chlorzoxazone] Unknown (comments)         SOCIAL HISTORY:     Social History     Socioeconomic History    Marital status:    Tobacco Use    Smoking status: Current Every Day Smoker     Packs/day: 1.00     Years: 50.00     Pack years: 50.00    Smokeless tobacco: Never Used   Substance and Sexual Activity    Alcohol use: Not Currently    Drug use: Never    Sexual activity: Yes     Partners: Male         SURGICAL HISTORY:    Past Surgical History:   Procedure Laterality Date    HX CHOLECYSTECTOMY  2013    HX COLONOSCOPY  10/20/2017    HX COLONOSCOPY  2014    HX COLONOSCOPY  2007    HX HEENT      HX HYMENECTOMY      HX ORTHOPAEDIC Left 07/07/2020    left rotator cuff    SC BREAST SURGERY PROCEDURE UNLISTED Right 02/2016    LUMPECTOMY     SC MASTECTOMY, PARTIAL Right 2016        FAMILY HISTORY:    Family History   Problem Relation Age of Onset    Thyroid Disease Mother     Heart Disease Mother     Hypertension Mother     Heart Attack Mother     Cancer Father         SPINE     Other Father         COMMITTED SUICIDE     Diabetes Brother     Heart Disease Brother     Diabetes Sister     Cancer Sister     Diabetes Brother     Heart Disease Brother     Diabetes Brother     Cancer Brother     Lung Disease Brother     Anesth Problems Neg Hx          REVIEW OF SYSTEMS:    I personally collected this information from all available source present (patient/others in room and records available) -[email or web address]  Review of Systems   Constitutional: Negative for chills, diaphoresis and fever. HENT: Positive for hearing loss and sinus pain. Negative for congestion, sore throat and tinnitus. Eyes: Positive for blurred vision. Negative for double vision and photophobia. Respiratory: Negative for cough, shortness of breath and wheezing. Cardiovascular: Negative for chest pain, palpitations, orthopnea, leg swelling and PND. Gastrointestinal: Negative for abdominal pain, constipation, diarrhea, nausea and vomiting. Genitourinary: Negative for dysuria and urgency. Musculoskeletal: Positive for joint pain.  Negative for back pain, myalgias and neck pain. Neurological: Negative for dizziness, tingling, sensory change, weakness and headaches. PHYSICAL EXAMINATION:    Vital Signs:    Visit Vitals  BP (!) 163/79 (BP 1 Location: Left upper arm, BP Patient Position: Sitting)   Pulse 98   Temp 97.5 °F (36.4 °C) (Temporal)   Ht 5' 4\" (1.626 m)   Wt 249 lb (112.9 kg)   SpO2 95%   BMI 42.74 kg/m²         Wt Readings from Last 3 Encounters:   12/13/21 249 lb (112.9 kg)   11/18/21 250 lb 6.4 oz (113.6 kg)   08/19/21 236 lb (107 kg)     BP Readings from Last 3 Encounters:   12/13/21 (!) 163/79   11/18/21 (!) 142/70   08/19/21 132/80         Physical Exam  Nursing note reviewed. Constitutional:       General: She is not in acute distress. Appearance: Normal appearance. She is obese. She is not ill-appearing or toxic-appearing. HENT:      Right Ear: Tympanic membrane, ear canal and external ear normal.      Left Ear: Tympanic membrane, ear canal and external ear normal.      Nose: No congestion or rhinorrhea. Mouth/Throat:      Pharynx: No oropharyngeal exudate or posterior oropharyngeal erythema. Eyes:      General: No scleral icterus. Extraocular Movements: Extraocular movements intact. Conjunctiva/sclera: Conjunctivae normal.      Pupils: Pupils are equal, round, and reactive to light. Neck:      Vascular: No carotid bruit. Cardiovascular:      Rate and Rhythm: Normal rate and regular rhythm. Heart sounds: No murmur heard. No friction rub. No gallop. Pulmonary:      Effort: Pulmonary effort is normal.      Breath sounds: Normal breath sounds. No wheezing, rhonchi or rales. Musculoskeletal:         General: No deformity. Cervical back: No rigidity. No muscular tenderness. Right lower leg: No edema. Left lower leg: No edema. Lymphadenopathy:      Cervical: No cervical adenopathy. Skin:     Coloration: Skin is not jaundiced. Findings: No erythema or rash.    Neurological: General: No focal deficit present. Mental Status: She is alert and oriented to person, place, and time. Mental status is at baseline. Gait: Gait normal.   Psychiatric:         Mood and Affect: Mood normal.         Behavior: Behavior normal.         Thought Content: Thought content normal.         Judgment: Judgment normal.           ASSESSMENT/PLAN:       Pre operative assessment for cataract extraction performed   NO contraindications   Low risk   Follow up and treat as indicated   Surgery center H and P completed and faxed to surgeon      ICD-10-CM ICD-9-CM    1. Pre-op evaluation  Z01.818 V72.84    2. Cataract, unspecified cataract type, unspecified laterality  H26.9 366.9    3. Essential hypertension  I10 401.9    4. Type 2 diabetes with nephropathy (HCC)  E11.21 250.40      583.81    5. Chronic obstructive pulmonary disease, unspecified COPD type ([facility]  J44.9 496    6. Acquired hypothyroidism  E03.9 244.9    7. Gastroesophageal reflux disease without esophagitis  K21.9 530.81      Discussion (regarding today's visit with Lina Damian);   WE gave the patient a review of medications, treatment, testing such as labs, imagine, referrals and when to call regarding results and appointments.  Reminded patient to keep any and all appointments with specialists, labs, imaging.  Reminded patient to make sure we get copies of any specialists care, labs and imaging.  Reminded patient to call of come by the office if there are any concerns, questions , comments or problems.  The patient verbalized understanding of the care plan and all questions were answered to the patient's satisfaction prior to leaving the office.  The patient was told that failure to comply with recommended testing could result in abnormal health consequences.      The patient was instructed to have yearly routine health maintenance including but not limited to age appropriate vaccines, testing, screening exams.   ALL questions were answered to her satisfaction before leaving the office. The patient actively participated in medical decision making. This date I spent  12 minutes reviewing chart, previous notes, tests and interviewing and examining patients answering questions providing follow up as well as ordering tests. FOLLOW UP:     Patient knows to keep any and all future visits scheduled unless told otherwise. Patient knows to call, come back if any concerns, questions, comments or problems arise. Follow-up and Dispositions    · Return if symptoms worsen or fail to improve. Signed By: Afshin Stewart DO     December 13, 2021     TIME: 6:48 pm    This visit was reviewed and signed electronically. It was been completed with voice recognition software and hand typing. It may have syntax and spelling errors despite editing.

## 2021-12-13 NOTE — PATIENT INSTRUCTIONS
General Health and concerns:  HEART HEALTHY DIET:  A heart healthy diet is one that is low in cholesterol (less than 300 mg daily), fat (less than 80 g daily) . You should also minimize carbohydrates / sugars (less amounts of breads, pastas, potato and potato products and sugary foods/snacks, cookies, cakes, etc) . Try to eat whole wheat/multigrain breads and pastas and eat more vegetables. Cook with olive oil (or no oil) and grill, bake, broil or boil foods. Less red meat and more chicken , fish and lean cuts of beef (limited). 1326-2275 calories per day is sufficient 3533-9215 is acceptable for weight loss. EXERCISE:  You should do exercise 3-5 days per week (minimum) to include increasing your heart rate for 30 to 45 minutes. At least a pace of a brisk walk should do that. This build up your heart and lung endurance and muscles and helps many function of the body. OTHER:    IF your condition(s) do not improve, get worse and/or if any concerns arise, please call or come by the office. Routine Health maintenance: You need to get a yearly follow up/physical exam to review, discuss age and gender appropriate exams, labs, vaccines and screening tests. This includes cardiovascular health risk, cancer screens and other tim related topics. Medications-Take all medications as directed. Please do not stop unless you talk to your doctor or health care provider first. Report any problems immediately. PLEASE CHECK THE LIST FROM TODAY's VISIT and make SURE IT IS ACCURATE-Please bring any issues to our concern IMMEDIATELY!! Referrals: if you have been given a referral, please call the office if you do not hear from provider in one week. You may make the appointment yourself. Please keep all appointments with specialists and ask them to send their notes, thoughts, recommendations to us , as your PCP.     KEEP all upcoming appointments with our office UNLESS otherwise and specifically told not to. CHECK your diagnosis/problem list for today and that orders and prescriptions are what we discussed as well as MAKE sure all information is accurate and has been discussed to your satisfaction. PLEASE make sure all your questions have been answered and feel free to call or come back should any concerns arise. Imaging/Labs:  Be sure to get these images in a timely manner. IF your test must be scheduled, let us know if you need help getting this done and if you do not hear from that provider in a week , call us or them. BE SURE to call the office if you do not hear regarding the results in one week after the test is performed Image or lab). It is our intention to inform you of the results ALWAYS, even if normal you should get a notification (Call, portal message). PLEASE juliana if you do not get the results. PLEASE follow all recommendations and call/come in /ask questions if you do not understand of if problems develop after or in between visits. Failure to comply with recommended health care advise could result in serious health consequences. Thank you for choosing our practice and please let us know how we can help you feel better and stay well!

## 2021-12-20 RX ORDER — LEVOTHYROXINE SODIUM 100 UG/1
TABLET ORAL
Qty: 90 TABLET | Refills: 1 | Status: SHIPPED | OUTPATIENT
Start: 2021-12-20 | End: 2022-06-10 | Stop reason: SDUPTHER

## 2022-03-18 PROBLEM — E11.9 TYPE 2 DIABETES MELLITUS WITHOUT COMPLICATION (HCC): Status: ACTIVE | Noted: 2020-09-22

## 2022-03-18 PROBLEM — Z13.39 SCREENING FOR ALCOHOLISM: Status: ACTIVE | Noted: 2020-09-24

## 2022-03-18 PROBLEM — F32.A DEPRESSIVE DISORDER: Status: ACTIVE | Noted: 2020-09-22

## 2022-03-18 PROBLEM — E11.21 TYPE 2 DIABETES WITH NEPHROPATHY (HCC): Status: ACTIVE | Noted: 2020-09-24

## 2022-03-19 PROBLEM — Z13.31 SCREENING FOR DEPRESSION: Status: ACTIVE | Noted: 2020-09-24

## 2022-03-19 PROBLEM — E66.01 SEVERE OBESITY (HCC): Status: ACTIVE | Noted: 2020-09-24

## 2022-03-19 PROBLEM — M75.122 NONTRAUMATIC COMPLETE TEAR OF LEFT ROTATOR CUFF: Status: ACTIVE | Noted: 2020-07-01

## 2022-03-19 PROBLEM — E78.00 PURE HYPERCHOLESTEROLEMIA: Status: ACTIVE | Noted: 2020-09-22

## 2022-03-19 PROBLEM — C50.919 MALIGNANT TUMOR OF BREAST (HCC): Status: ACTIVE | Noted: 2020-09-22

## 2022-03-19 PROBLEM — E03.9 HYPOTHYROIDISM: Status: ACTIVE | Noted: 2020-09-22

## 2022-03-20 PROBLEM — I10 ESSENTIAL HYPERTENSION: Status: ACTIVE | Noted: 2020-09-22

## 2022-03-20 PROBLEM — J44.9 CHRONIC OBSTRUCTIVE PULMONARY DISEASE (HCC): Status: ACTIVE | Noted: 2020-09-24

## 2022-04-09 DIAGNOSIS — F51.01 PRIMARY INSOMNIA: ICD-10-CM

## 2022-04-10 RX ORDER — TRAZODONE HYDROCHLORIDE 100 MG/1
100 TABLET ORAL
Qty: 90 TABLET | Refills: 0 | Status: SHIPPED | OUTPATIENT
Start: 2022-04-10 | End: 2022-07-12

## 2022-05-12 ENCOUNTER — TRANSCRIBE ORDER (OUTPATIENT)
Dept: SCHEDULING | Age: 72
End: 2022-05-12

## 2022-05-12 DIAGNOSIS — N63.20 LEFT BREAST LUMP: Primary | ICD-10-CM

## 2022-05-12 DIAGNOSIS — Z85.3 HISTORY OF BREAST CANCER: ICD-10-CM

## 2022-05-17 DIAGNOSIS — I10 ESSENTIAL HYPERTENSION: ICD-10-CM

## 2022-05-18 ENCOUNTER — TRANSCRIBE ORDER (OUTPATIENT)
Dept: SCHEDULING | Age: 72
End: 2022-05-18

## 2022-05-18 ENCOUNTER — OFFICE VISIT (OUTPATIENT)
Dept: FAMILY MEDICINE CLINIC | Age: 72
End: 2022-05-18
Payer: MEDICARE

## 2022-05-18 VITALS
HEIGHT: 64 IN | RESPIRATION RATE: 18 BRPM | TEMPERATURE: 98.6 F | HEART RATE: 96 BPM | SYSTOLIC BLOOD PRESSURE: 173 MMHG | OXYGEN SATURATION: 98 % | WEIGHT: 243 LBS | DIASTOLIC BLOOD PRESSURE: 101 MMHG | BODY MASS INDEX: 41.48 KG/M2

## 2022-05-18 DIAGNOSIS — E66.01 OBESITY, CLASS III, BMI 40-49.9 (MORBID OBESITY) (HCC): ICD-10-CM

## 2022-05-18 DIAGNOSIS — F33.1 MODERATE EPISODE OF RECURRENT MAJOR DEPRESSIVE DISORDER (HCC): ICD-10-CM

## 2022-05-18 DIAGNOSIS — J44.9 COPD (CHRONIC OBSTRUCTIVE PULMONARY DISEASE) (HCC): Primary | ICD-10-CM

## 2022-05-18 DIAGNOSIS — F17.200 TOBACCO DEPENDENCE: ICD-10-CM

## 2022-05-18 DIAGNOSIS — E78.00 PURE HYPERCHOLESTEROLEMIA: ICD-10-CM

## 2022-05-18 DIAGNOSIS — E11.9 TYPE 2 DIABETES MELLITUS WITHOUT COMPLICATION, WITHOUT LONG-TERM CURRENT USE OF INSULIN (HCC): ICD-10-CM

## 2022-05-18 DIAGNOSIS — J44.9 CHRONIC OBSTRUCTIVE PULMONARY DISEASE, UNSPECIFIED COPD TYPE (HCC): ICD-10-CM

## 2022-05-18 DIAGNOSIS — R91.1 LUNG NODULE: ICD-10-CM

## 2022-05-18 DIAGNOSIS — R92.8 ABNORMAL MAMMOGRAM: Primary | ICD-10-CM

## 2022-05-18 DIAGNOSIS — C50.911 MALIGNANT NEOPLASM OF RIGHT FEMALE BREAST, UNSPECIFIED ESTROGEN RECEPTOR STATUS, UNSPECIFIED SITE OF BREAST (HCC): ICD-10-CM

## 2022-05-18 DIAGNOSIS — I10 PRIMARY HYPERTENSION: ICD-10-CM

## 2022-05-18 DIAGNOSIS — E11.21 TYPE 2 DIABETES WITH NEPHROPATHY (HCC): ICD-10-CM

## 2022-05-18 DIAGNOSIS — Z00.00 MEDICARE ANNUAL WELLNESS VISIT, SUBSEQUENT: Primary | ICD-10-CM

## 2022-05-18 DIAGNOSIS — E03.9 ACQUIRED HYPOTHYROIDISM: ICD-10-CM

## 2022-05-18 DIAGNOSIS — E55.9 VITAMIN D DEFICIENCY: ICD-10-CM

## 2022-05-18 DIAGNOSIS — R01.1 MURMUR, CARDIAC: ICD-10-CM

## 2022-05-18 PROBLEM — E11.22 TYPE 2 DIABETES MELLITUS WITH CHRONIC KIDNEY DISEASE (HCC): Status: ACTIVE | Noted: 2022-05-18

## 2022-05-18 PROCEDURE — G8753 SYS BP > OR = 140: HCPCS | Performed by: NURSE PRACTITIONER

## 2022-05-18 PROCEDURE — 1101F PT FALLS ASSESS-DOCD LE1/YR: CPT | Performed by: NURSE PRACTITIONER

## 2022-05-18 PROCEDURE — G0439 PPPS, SUBSEQ VISIT: HCPCS | Performed by: NURSE PRACTITIONER

## 2022-05-18 PROCEDURE — G8417 CALC BMI ABV UP PARAM F/U: HCPCS | Performed by: NURSE PRACTITIONER

## 2022-05-18 PROCEDURE — G9899 SCRN MAM PERF RSLTS DOC: HCPCS | Performed by: NURSE PRACTITIONER

## 2022-05-18 PROCEDURE — G8536 NO DOC ELDER MAL SCRN: HCPCS | Performed by: NURSE PRACTITIONER

## 2022-05-18 PROCEDURE — G8755 DIAS BP > OR = 90: HCPCS | Performed by: NURSE PRACTITIONER

## 2022-05-18 PROCEDURE — 3017F COLORECTAL CA SCREEN DOC REV: CPT | Performed by: NURSE PRACTITIONER

## 2022-05-18 PROCEDURE — G8427 DOCREV CUR MEDS BY ELIG CLIN: HCPCS | Performed by: NURSE PRACTITIONER

## 2022-05-18 PROCEDURE — 99214 OFFICE O/P EST MOD 30 MIN: CPT | Performed by: NURSE PRACTITIONER

## 2022-05-18 PROCEDURE — 2022F DILAT RTA XM EVC RTNOPTHY: CPT | Performed by: NURSE PRACTITIONER

## 2022-05-18 PROCEDURE — G9717 DOC PT DX DEP/BP F/U NT REQ: HCPCS | Performed by: NURSE PRACTITIONER

## 2022-05-18 PROCEDURE — 1123F ACP DISCUSS/DSCN MKR DOCD: CPT | Performed by: NURSE PRACTITIONER

## 2022-05-18 PROCEDURE — G8399 PT W/DXA RESULTS DOCUMENT: HCPCS | Performed by: NURSE PRACTITIONER

## 2022-05-18 PROCEDURE — 1090F PRES/ABSN URINE INCON ASSESS: CPT | Performed by: NURSE PRACTITIONER

## 2022-05-18 PROCEDURE — 3051F HG A1C>EQUAL 7.0%<8.0%: CPT | Performed by: NURSE PRACTITIONER

## 2022-05-18 RX ORDER — AMLODIPINE BESYLATE 10 MG/1
10 TABLET ORAL DAILY
Qty: 90 TABLET | Refills: 1 | Status: SHIPPED | OUTPATIENT
Start: 2022-05-18

## 2022-05-18 RX ORDER — VITAMIN E 268 MG
400 CAPSULE ORAL DAILY
COMMUNITY
End: 2022-08-10 | Stop reason: SDUPTHER

## 2022-05-18 RX ORDER — VENLAFAXINE HYDROCHLORIDE 75 MG/1
75 CAPSULE, EXTENDED RELEASE ORAL DAILY
COMMUNITY
Start: 2022-04-13 | End: 2022-05-18 | Stop reason: DRUGHIGH

## 2022-05-18 RX ORDER — VENLAFAXINE HYDROCHLORIDE 150 MG/1
150 CAPSULE, EXTENDED RELEASE ORAL DAILY
Qty: 90 CAPSULE | Refills: 1 | Status: SHIPPED | OUTPATIENT
Start: 2022-05-18

## 2022-05-18 NOTE — PATIENT INSTRUCTIONS
Medicare Wellness Visit, Female     The best way to live healthy is to have a lifestyle where you eat a well-balanced diet, exercise regularly, limit alcohol use, and quit all forms of tobacco/nicotine, if applicable. Regular preventive services are another way to keep healthy. Preventive services (vaccines, screening tests, monitoring & exams) can help personalize your care plan, which helps you manage your own care. Screening tests can find health problems at the earliest stages, when they are easiest to treat. [facility] follows the current, evidence-based guidelines published by the [facility] [employer] ([facility]) when recommending preventive services for our patients. Because we follow these guidelines, sometimes recommendations change over time as research supports it. (For example, mammograms used to be recommended annually. Even though Medicare will still pay for an annual mammogram, the newer guidelines recommend a mammogram every two years for women of average risk). Of course, you and your doctor may decide to screen more often for some diseases, based on your risk and your co-morbidities (chronic disease you are already diagnosed with). Preventive services for you include:  - Medicare offers their members a free annual wellness visit, which is time for you and your primary care provider to discuss and plan for your preventive service needs. Take advantage of this benefit every year!  -All adults over the age of 72 should receive the recommended pneumonia vaccines. Current USPSTF guidelines recommend a series of two vaccines for the best pneumonia protection.   -All adults should have a flu vaccine yearly and a tetanus vaccine every 10 years.   -All adults age 48 and older should receive the shingles vaccines (series of two vaccines).       -All adults age 38-68 who are overweight should have a diabetes screening test once every three years.   -All adults born between [birth year] and 1965 should be screened once for Hepatitis C.  -Other screening tests and preventive services for persons with diabetes include: an eye exam to screen for diabetic retinopathy, a kidney function test, a foot exam, and stricter control over your cholesterol.   -Cardiovascular screening for adults with routine risk involves an electrocardiogram (ECG) at intervals determined by your doctor.   -Colorectal cancer screenings should be done for adults age 54-65 with no increased risk factors for colorectal cancer. There are a number of acceptable methods of screening for this type of cancer. Each test has its own benefits and drawbacks. Discuss with your doctor what is most appropriate for you during your annual wellness visit. The different tests include: colonoscopy (considered the best screening method), a fecal occult blood test, a fecal DNA test, and sigmoidoscopy.    -A bone mass density test is recommended when a woman turns 65 to screen for osteoporosis. This test is only recommended one time, as a screening. Some providers will use this same test as a disease monitoring tool if you already have osteoporosis. -Breast cancer screenings are recommended every other year for women of normal risk, age 54-69.  -Cervical cancer screenings for women over age 72 are only recommended with certain risk factors.      Here is a list of your current Health Maintenance items (your personalized list of preventive services) with a due date:  Health Maintenance Due   Topic Date Due    Hepatitis C Test  Never done    Diabetic Foot Care  Never done    Eye Exam  Never done    Colorectal Screening  Never done    Shingles Vaccine (1 of 2) Never done    Smoker or Former Smoker - [address]  Never done    DTaP/Tdap/Td  (1 - Tdap) 01/02/2007    Pneumococcal Vaccine (3 - PPSV23 or PCV20) 04/06/2017    Annual Well Visit  Never done    Albumin Urine Test  10/15/2021    Hemoglobin A1C 04/28/2022    Cholesterol Test   04/28/2022           Here is some info on services offered by Quit Now [place] program thru the [employer]. If you put [employer] and do a search for [employer] [place], there is a video to watch for some quick info. I hope that you will look into these resources. Research shows that counseling along w/ cessation attempts works better than going it alone. Available 24 hours a day/ 7 days a week  Qualified [employer]  One-on-one cessation assistance  Service provided to all [place] residents, ages 15 +  TTY service available for the hearing impaired  Counseling available in [place] and [place]  Tailored services for pregnant women  Information and self-help materials are available  Free and confidential    Q: What services does the Quitline offer? A: The Quitline offers free telephone or web-based counseling, [employer] support, self-help  materials and referral to local resources. There is no limit to the number of times someone can  call. The [employer] and  funding is provided by [employer] for [employer] and Prevention ([facility]). [employer] provides  the tobacco cessation services. This is a highly specialized company with contracts to provide  services to numerous health plans, employers and government organizations. Services are  accessed by calling  9-489- [employer] (8-322.835.8332). Website: [email or web address].

## 2022-05-18 NOTE — PROGRESS NOTES
Chief Complaint   Patient presents with    Annual Wellness Visit     Visit Vitals  BP (!) 173/101 (BP 1 Location: Left upper arm, BP Patient Position: Sitting, BP Cuff Size: Adult)   Pulse 96   Temp 98.6 °F (37 °C) (Temporal)   Resp 18   Ht 5' 4\" (1.626 m)   Wt 243 lb (110.2 kg)   SpO2 98%   BMI 41.71 kg/m²     Subjective  Marylene Clas is a 66 y.o. female. HPI:  Pt presented for Medicare Wellness Assessment and followup. She also presented to establish with me as PCP as her previous PCP has departed the practice. She was was encouraged to continue annual [address] for health maintenance, preventive health updates and immunizations as needed. Patient has many comorbidities to include type 2 diabetes with neuropathy, COPD, hypertension, hypothyroidism, hypercholesterolemia, KARIS, osteoarthritis in multiple joints, depression, vitamin D deficiency history of right breast cancer, tobacco dependence, and morbid obesity. Hypertension-BP in office high at 173/101. Review of vital signs records indicate that BP has been high frequently at office appointments. She is occasionally checking it it at home. Patient was advised to check her BP daily and record with goal less than 130/80. She should bring her BP record to office for evaluation. Current medication regime is amlodipine 10 mg daily and furosemide 40 mg daily. Type 2 diabetes with nephropathy. Patient stated that her BS at home- ranging 138-146 fasting. Last A1c in record was 5.8 on 4/28/2021. Current med regime consists of Januvia 100 mg daily. Patient stated she is using her C-PAP regularly to treat her KARIS. Patient stated she was on tramadol for arthritis in multiple sites to include both her knees until approximately 1 year ago. At that time her former PCP became unable to write prescriptions for controlled substances. Hypothyroidism-current levothyroxine dose is 100 mcg daily.   No recent labs to check her thyroid for status so we will order many fasting routine labs today. Vitamin D deficiency-currently taking 5000 units of vitamin D daily. Did not find a vitamin D level in her current chart. Will adjust her supplement dose if indicated by the lab work. Patient Active Problem List   Diagnosis Code    Nontraumatic complete tear of left rotator cuff M75.122    GERD (gastroesophageal reflux disease) K21.9    Arthritis M19.90    Depressive disorder F32. A    Essential hypertension I10    Hypothyroidism E03.9    Pure hypercholesterolemia E78.00    Severe obesity (HCC) E66.01    Screening for alcoholism Z13.39    Screening for depression Z13.31    Chronic obstructive pulmonary disease (HCC) J44.9    Type 2 diabetes mellitus with chronic kidney disease (HCC) E11.22    Sleep apnea G47.30    Hyperlipidemia E78.5    Malignant neoplasm ([facility]) C80.1     Past Medical History:   Diagnosis Date    Arthritis     Asthma     Cancer ([address]     BREAST CANCER RIGHT BREAST LUMPECTOMY     Chronic pain     COPD (chronic obstructive pulmonary disease) ([facility])     Depressive disorder 9/22/2020    Diabetes ([address]     Essential hypertension 9/22/2020    GERD (gastroesophageal reflux disease)     Hypothyroidism 9/22/2020    Malignant tumor of breast ([address] 9/22/2020    Osteoarthritis 9/22/2020    Pure hypercholesterolemia 9/22/2020    Sleep apnea     Thyroid disease 1980    REMOVED     Type 2 diabetes mellitus without complication ([address] 3/82/6112     Past Surgical History:   Procedure Laterality Date    HX CHOLECYSTECTOMY  2013    HX COLONOSCOPY  10/20/2017    HX COLONOSCOPY  2014    HX COLONOSCOPY  2007    HX HEENT      HX HYMENECTOMY      HX ORTHOPAEDIC Left 07/07/2020    left rotator cuff    CT BREAST SURGERY PROCEDURE UNLISTED Right 02/2016    LUMPECTOMY     CT MASTECTOMY, PARTIAL Right 2016     Current Outpatient Medications   Medication Instructions    amLODIPine (NORVASC) 10 mg, Oral, DAILY    anastrozole (ARIMIDEX) 1 mg, Oral, DAILY    aspirin delayed-release 81 mg, Oral, DAILY cholecalciferol (VITAMIN D3) 5,000 Units, Oral, DAILY    furosemide (LASIX) 40 mg tablet One tablet daily by oral route    levothyroxine (SYNTHROID) 100 mcg, Oral, DAILY BEFORE BREAKFAST    omega-3 fatty acids/dha/epa (MEGARED PLANT-OMEGA-3 PO) 800 mg, Oral, DAILY    pantoprazole (PROTONIX) 40 mg tablet TAKE 1 TABLET BY MOUTH EVERY DAY    rosuvastatin (CRESTOR) 40 mg, Oral, EVERY BEDTIME    SITagliptin (JANUVIA) 100 mg, Oral, DAILY, Stop janumet-we are stopping metformin altogether    traZODone (DESYREL) 100 mg, Oral, EVERY BEDTIME, As needed for sleep. Trelegy Ellipta 100-62.5-25 mcg inhaler INHALE 1 PUFF BY MOUTH EVERY DAY    venlafaxine-SR (EFFEXOR-XR) 150 mg, Oral, DAILY    vitamin E (AQUA GEMS) 400 Units, Oral, DAILY    vitamin E (AQUA GEMS) 400 Units, Oral, DAILY     Allergies   Allergen Reactions    Ampicillin Unknown (comments)    Erythromycin Base Unknown (comments)    Other Medication Other (comments)     TUBERCULOSIS  PT GETS CELLULITIS            Parafon Forte Dsc [Chlorzoxazone] Unknown (comments)     Social History     Tobacco Use    Smoking status: Every Day     Packs/day: 1.00     Years: 50.00     Pack years: 50.00     Types: Cigarettes    Smokeless tobacco: Never   Vaping Use    Vaping Use: Never used   Substance Use Topics    Alcohol use: Not Currently    Drug use: Never     Family History   Problem Relation Age of Onset    Thyroid Disease Mother     Heart Disease Mother     Hypertension Mother     Heart Attack Mother     Cancer Father         SPINE     Other Father         COMMITTED SUICIDE     Diabetes Brother     Heart Disease Brother     Diabetes Sister     Cancer Sister     Diabetes Brother     Heart Disease Brother     Diabetes Brother     Cancer Brother     Lung Disease Brother     Anesth Problems Neg Hx        Review of Systems   Constitutional:  Negative for chills, fever and malaise/fatigue. HENT:  Negative for congestion, ear pain and sore throat.     Respiratory:  Positive for shortness of breath. Negative for cough and wheezing. Cardiovascular:  Negative for chest pain, palpitations and leg swelling. Gastrointestinal: Negative. Negative for abdominal pain, diarrhea, heartburn, nausea and vomiting. Genitourinary: Negative. Musculoskeletal:  Positive for joint pain. Negative for back pain, falls, myalgias and neck pain. Chronic knee pain   Neurological:  Negative for dizziness, tingling, sensory change, weakness and headaches. Psychiatric/Behavioral:  Negative for depression. The patient is not nervous/anxious and does not have insomnia. Objective  Physical Exam  Constitutional:       General: She is not in acute distress. Appearance: Normal appearance. She is obese. HENT:      Head: Normocephalic. Right Ear: External ear normal.      Left Ear: External ear normal.      Nose: Nose normal.   Eyes:      Conjunctiva/sclera: Conjunctivae normal.   Neck:      Vascular: No carotid bruit. Cardiovascular:      Rate and Rhythm: Normal rate and regular rhythm. Heart sounds: Murmur heard. Pulmonary:      Effort: Pulmonary effort is normal. No respiratory distress. Breath sounds: Normal breath sounds. Musculoskeletal:         General: No swelling or tenderness. Normal range of motion. Cervical back: Neck supple. Right lower leg: No edema. Left lower leg: No edema. Skin:     General: Skin is warm and dry. Coloration: Skin is not jaundiced. Findings: No lesion or rash. Neurological:      Mental Status: She is alert and oriented to person, place, and time. Motor: No weakness. Gait: Gait normal.   Psychiatric:         Mood and Affect: Mood normal.         Behavior: Behavior normal.         Thought Content: Thought content normal.         Judgment: Judgment normal.       Assessment & Plan    ICD-10-CM ICD-9-CM    1. Medicare annual wellness visit, subsequent  Z00.00 V70.0       2.  Type 2 diabetes mellitus without complication, without long-term current use of insulin ([facility])  N41.9 186.42 METABOLIC PANEL, COMPREHENSIVE      HEMOGLOBIN A1C WITH EAG      MICROALBUMIN, UR, RAND W/ MICROALB/CREAT RATIO      3. Type 2 diabetes with nephropathy ([facility])  E11.21 250.40      583.81       4. Chronic obstructive pulmonary disease, unspecified COPD type ([facility]  J44.9 496       5. Primary hypertension  I10 401.9 amLODIPine (NORVASC) 10 mg tablet      6. Acquired hypothyroidism  E03.9 244.9 T4, FREE      TSH 3RD GENERATION      7. Pure hypercholesterolemia  E78.00 272.0 CBC WITH AUTOMATED DIFF      RETICULOCYTE COUNT      LIPID PANEL      8. Moderate episode of recurrent major depressive disorder ([facility])  F33.1 296.32 venlafaxine-SR (EFFEXOR-XR) 150 mg capsule      9. Murmur, cardiac  R01.1 785.2 REFERRAL TO CARDIOLOGY      10. Vitamin D deficiency  E55.9 268.9 VITAMIN D, 25 HYDROXY      11. Malignant neoplasm of right female breast, unspecified estrogen receptor status, unspecified site of breast ([place] [address]  C50.911 174.9       12. Obesity, Class III, BMI 40-49.9 (morbid obesity) ([facility])  E66.01 278.01       13. Tobacco dependence  F17.200 305.1           1. Type 2 diabetes mellitus without complication, without long-term current use of insulin ([facility])  Diagnosis is an error. Should be #2.    - METABOLIC PANEL, COMPREHENSIVE  - HEMOGLOBIN A1C WITH EAG  - MICROALBUMIN, UR, RAND W/ MICROALB/CREAT RATIO    2. Type 2 diabetes with nephropathy ([facility]  Depending on lab results which are more than a year old may need to adjust her medication regime. She does not adhere to a diabetic diet most of the time. 3. Chronic obstructive pulmonary disease, unspecified COPD type ([facility]  Under care of a pulmonologist.  Current medicine regime is Trelegy inhaler. Patient at baseline for her COPD with no recent exacerbation. 4. Primary hypertension  Hard to know what status patient is at with her BP.   Not checking at home often so cannot compare office values versus home values. She was advised to start checking her BP daily so that an assessment can be done. Discussed adding on medication and patient was agreeable. Will keep a close eye on status of BP control.    - amLODIPine (NORVASC) 10 mg tablet; Take 1 Tablet by mouth daily. Dispense: 90 Tablet; Refill: 1    5. Acquired hypothyroidism  Currently stable on current med regime. Will adjust dose if labs indicate a need. - T4, FREE  - TSH 3RD GENERATION    6. Pure hypercholesterolemia  Last lipid results in record are from 4/28/2021 at which time her triglycerides were 262 mg DL. Currently taking rosuvastatin 20 mg daily. Will adjust dose if indicated by lab work. - CBC WITH AUTOMATED DIFF  - RETICULOCYTE COUNT  - LIPID PANEL    7. Moderate episode of recurrent major depressive disorder ([facility]  Patient is satisfied with symptom control with venlafaxine. Will continue current med regime without changes. - venlafaxine-SR (EFFEXOR-XR) 150 mg capsule; Take 1 Capsule by mouth daily. Dispense: 90 Capsule; Refill: 1    8. Murmur, cardiac  This is a new finding apparently has not noted in her recent records. She stated she has never been assessed by cardiology any kind of imaging etc.  Patient was agreeable to the referral.  She was advised to call for an appointment asap. .    - REFERRAL TO CARDIOLOGY    9. Vitamin D deficiency  Current status unknown. Will adjust her med dose if indicated by labs. - VITAMIN D, 25 HYDROXY    10. Malignant neoplasm of right female breast, unspecified estrogen receptor status, unspecified site of breast [facility])  Patient had partial mastectomy of right breast in 2016. 11. Obesity, Class III, BMI 40-49.9 (morbid obesity) ([facility]  Patient is not motivated to work on losing weight or adhering closely to diabetic diet at this time. However we will continue to revisit at each encounter and will encourage her to try diabetic education classes.     12.  Tobacco dependence  Patient is in contemplation phase of change and not ready to make a tobacco cessation attempt at this time. However she is aware of the health consequences of continued tobacco use. She was given written information about smoking cessation and also information about the [address] quit [facility] program to include their website and 24-hour telephone line manned by trained tobacco cessation counselors for use anytime of the day. Will revisit at each encounter. I have discussed the diagnosis with the patient and the intended plan as seen in the above orders. Pt/caretaker has expressed understanding. Questions were answered concerning future plans. I have discussed medication side effects and warnings as indicated with the patient as well.     Ilda Awan, SHARI

## 2022-05-18 NOTE — ACP (ADVANCE CARE PLANNING)
Advance Care Planning     Advance Care Planning (ACP) Physician/NP/PA Conversation      Date of Conversation: 5/18/2022  Conducted with: Patient with Decision Making Capacity    Healthcare Decision Maker:   No healthcare decision makers have been documented. Click here to complete [address] including selection of the Healthcare Decision Maker Relationship (ie \"Primary\")        Care Preferences:    Hospitalization: \"If your health worsens and it becomes clear that your chance of recovery is unlikely, what would be your preference regarding hospitalization? \"  The patient would prefer comfort-focused treatment without hospitalization. Ventilation: \"If you were unable to breathe on your own and your chance of recovery was unlikely, what would be your preference about the use of a ventilator (breathing machine) if it was available to you? \"   The patient would NOT desire the use of a ventilator. Resuscitation: \"In the event your heart stopped as a result of an underlying serious health condition, would you want attempts to be made to restart your heart, or would you prefer a natural death? \"   Yes, attempt to resuscitate.     Additional topics discussed: treatment goals    Conversation Outcomes / Follow-Up Plan:   ACP in process - information provided, considering goals and options  Reviewed DNR/DNI and patient elects Full Code (Attempt Resuscitation)     Length of Voluntary ACP Conversation in minutes:  <16 minutes (Non-Billable)    Oma Spatz, NP

## 2022-05-18 NOTE — PROGRESS NOTES
This is the Subsequent Medicare Annual Wellness Exam, performed 12 months or more after the Initial AWV or the last Subsequent AWV    I have reviewed the patient's medical history in detail and updated the computerized patient record. Assessment/Plan   Education and counseling provided:  Are appropriate based on today's review and evaluation     1. Type 2 diabetes mellitus without complication, without long-term current use of insulin (HCC)  -     METABOLIC PANEL, COMPREHENSIVE  -     HEMOGLOBIN A1C WITH EAG  -     MICROALBUMIN, UR, RAND W/ MICROALB/CREAT RATIO  2. Acquired hypothyroidism  -     T4, FREE  -     TSH 3RD GENERATION  3. Pure hypercholesterolemia  -     CBC WITH AUTOMATED DIFF  -     RETICULOCYTE COUNT  -     LIPID PANEL  4. Vitamin D deficiency  -     VITAMIN D, 25 HYDROXY  5. Type 2 diabetes mellitus with stage 3a chronic kidney disease, without long-term current use of insulin (HCC)  6. Obesity, Class III, BMI 40-49.9 (morbid obesity) ([facility]  7. Chronic obstructive pulmonary disease, unspecified COPD type ([facility]  Assessment & Plan:   monitored by specialist. No acute findings meriting change in the plan  8. Type 2 diabetes with nephropathy ([facility]  9. Malignant neoplasm of right female breast, unspecified estrogen receptor status, unspecified site of breast ([facility]  Assessment & Plan:   monitored by specialist. No acute findings meriting change in the plan  10. Moderate episode of recurrent major depressive disorder (HCC)  -     venlafaxine-SR (EFFEXOR-XR) 150 mg capsule; Take 1 Capsule by mouth daily. , Normal, Disp-90 Capsule, R-1  11. Primary hypertension  -     amLODIPine (NORVASC) 10 mg tablet; Take 1 Tablet by mouth daily. , Normal, Disp-90 Tablet, R-1  12.  Murmur, cardiac  -     REFERRAL TO CARDIOLOGY     Depression Risk Factor Screening     3 most recent PHQ Screens 5/18/2022   Little interest or pleasure in doing things Nearly every day   Feeling down, depressed, irritable, or hopeless Nearly every day   Total Score PHQ 2 6   Trouble falling or staying asleep, or sleeping too much More than half the days   Feeling tired or having little energy More than half the days   Poor appetite, weight loss, or overeating Several days   Feeling bad about yourself - or that you are a failure or have let yourself or your family down Not at all   Trouble concentrating on things such as school, work, reading, or watching TV Not at all   Moving or speaking so slowly that other people could have noticed; or the opposite being so fidgety that others notice Not at all   Thoughts of being better off dead, or hurting yourself in some way Not at all   PHQ 9 Score 11   How difficult have these problems made it for you to do your work, take care of your home and get along with others Not difficult at all       Alcohol & Drug Abuse Risk Screen            Functional Ability and Level of Safety                     Fall Risk:  Fall Risk Assessment, last 12 mths 5/18/2022   Able to walk? Yes   Fall in past 12 months? 0   Do you feel unsteady?  0   Are you worried about falling 0             Cognitive Screening         Cognitive Screening: Normal - Clock Drawing Test      Health Maintenance Due     Health Maintenance Due   Topic Date Due    Hepatitis C Screening  Never done    Foot Exam Q1  Never done    Eye Exam Retinal or Dilated  Never done    Colorectal Cancer Screening Combo  Never done    Shingrix Vaccine Age 50> (1 of 2) Never done    Low dose CT lung screening  Never done    DTaP/Tdap/Td series (1 - Tdap) 01/02/2007    Pneumococcal 65+ years (3 - PPSV23 or PCV20) 04/06/2017    Medicare Yearly Exam  Never done    COVID-19 Vaccine (4 - Booster for Marcelo Peter series) 03/24/2022       Patient Care Team   Patient Care Team:  Omari Villalobos NP as PCP - General (Nurse Practitioner)  Omari Villalobos NP as PCP - REHABILITATION [facility] Empaneled Provider    History     Patient Active Problem List   Diagnosis Code    Nontraumatic complete tear of left rotator cuff M75.122    GERD (gastroesophageal reflux disease) K21.9    Arthritis M19.90    Depressive disorder F32. A    Essential hypertension I10    Hypothyroidism E03.9    Pure hypercholesterolemia E78.00    Severe obesity (HCC) E66.01    Screening for alcoholism Z13.39    Screening for depression Z13.31    Chronic obstructive pulmonary disease (HCC) J44.9    Type 2 diabetes mellitus with chronic kidney disease ([facility] E11.22    Sleep apnea G47.30    Hyperlipidemia E78.5    Malignant neoplasm (HCC) C80.1     Past Medical History:   Diagnosis Date    Arthritis     Asthma     Cancer ([facility]     BREAST CANCER RIGHT BREAST LUMPECTOMY     Chronic pain     COPD (chronic obstructive pulmonary disease) ([facility])     Depressive disorder 9/22/2020    Diabetes ([facility]     Essential hypertension 9/22/2020    GERD (gastroesophageal reflux disease)     Hypothyroidism 9/22/2020    Malignant tumor of breast ([facility] 9/22/2020    Osteoarthritis 9/22/2020    Pure hypercholesterolemia 9/22/2020    Sleep apnea     Thyroid disease 1980    REMOVED     Type 2 diabetes mellitus without complication ([facility] 2/90/5253      Past Surgical History:   Procedure Laterality Date    HX CHOLECYSTECTOMY  2013    HX COLONOSCOPY  10/20/2017    HX COLONOSCOPY  2014    HX COLONOSCOPY  2007    HX HEENT      HX HYMENECTOMY      HX ORTHOPAEDIC Left 07/07/2020    left rotator cuff    GA BREAST SURGERY PROCEDURE UNLISTED Right 02/2016    LUMPECTOMY     GA MASTECTOMY, PARTIAL Right 2016     Current Outpatient Medications   Medication Sig Dispense Refill    venlafaxine-SR (EFFEXOR-XR) 150 mg capsule Take 1 Capsule by mouth daily. 90 Capsule 1    amLODIPine (NORVASC) 10 mg tablet Take 1 Tablet by mouth daily. 90 Tablet 1    Trelegy Ellipta 100-62.5-25 mcg inhaler INHALE 1 PUFF BY MOUTH EVERY DAY      pantoprazole (PROTONIX) 40 mg tablet TAKE 1 TABLET BY MOUTH EVERY DAY 90 Tablet 3    SITagliptin (JANUVIA) 100 mg tablet Take 1 Tablet by mouth daily.  Stop janumet-gen are stopping metformin altogether 90 Tablet 3    meloxicam (MOBIC) 15 mg tablet TAKE 1 TABLET BY MOUTH EVERY DAY 90 Tablet 3    anastrozole (ARIMIDEX) 1 mg tablet Take 1 mg by mouth daily. aspirin delayed-release 81 mg tablet Take 81 mg by mouth daily. omega-3 fatty acids/dha/epa (MEGARED PLANT-OMEGA-3 PO) Take 800 mg by mouth daily. vitamin E (AQUA GEMS) 400 unit capsule Take 400 Units by mouth daily. cholecalciferol (Vitamin D3) (5000 Units/125 mcg) tab tablet Take 5,000 Units by mouth daily. traZODone (DESYREL) 100 mg tablet Take 1 Tablet by mouth nightly. As needed for sleep. 90 Tablet 1    levothyroxine (SYNTHROID) 100 mcg tablet Take 1 Tablet by mouth Daily (before breakfast). Indications: a condition with low thyroid hormone levels 90 Tablet 1    rosuvastatin (CRESTOR) 20 mg tablet Take 1 Tablet by mouth nightly. 90 Tablet 1    furosemide (LASIX) 40 mg tablet One tablet daily by oral route 90 Tablet 1    vitamin E (AQUA GEMS) 400 unit capsule Take 400 Units by mouth daily.        Allergies   Allergen Reactions    Ampicillin Unknown (comments)    Erythromycin Base Unknown (comments)    Other Medication Other (comments)     TUBERCULOSIS  PT GETS CELLULITIS            Parafon Forte Dsc [Chlorzoxazone] Unknown (comments)       Family History   Problem Relation Age of Onset    Thyroid Disease Mother     Heart Disease Mother     Hypertension Mother     Heart Attack Mother     Cancer Father         SPINE     Other Father         COMMITTED SUICIDE     Diabetes Brother     Heart Disease Brother     Diabetes Sister     Cancer Sister     Diabetes Brother     Heart Disease Brother     Diabetes Brother     Cancer Brother     Lung Disease Brother     Anesth Problems Neg Hx      Social History     Tobacco Use    Smoking status: Every Day     Packs/day: 1.00     Years: 50.00     Pack years: 50.00     Types: Cigarettes    Smokeless tobacco: Never   Substance Use Topics    Alcohol use: Not Currently 1. \"Have you been to the ER, urgent care clinic since your last visit? Hospitalized since your last visit?  no    2. \"Have you seen or consulted any other health care providers outside of the [address] [place] [address] since your last visit? \" yes    3. For patients aged 39-70: Has the patient had a colonoscopy / FIT/ Cologuard? 2020    If the patient is female:    4. For patients aged 41-77: Has the patient had a mammogram within the past 2 years? July 2022      5. For patients aged 21-65: Has the patient had a pap smear?    Pt cant recall       Nick Silva is a 66 y.o. female is coming in for with the following:     Chief Complaint   Patient presents with    Annual Wellness Visit          With the following vitals:    Visit Vitals  BP (!) 173/101 (BP 1 Location: Left upper arm, BP Patient Position: Sitting, BP Cuff Size: Adult)   Pulse 96   Temp 98.6 °F (37 °C) (Temporal)   Resp 18   Ht 5' 4\" (1.626 m)   Wt 243 lb (110.2 kg)   SpO2 98%   BMI 41.71 kg/m²          [facility]

## 2022-05-19 RX ORDER — FUROSEMIDE 40 MG/1
TABLET ORAL
Qty: 90 TABLET | Refills: 1 | Status: SHIPPED | OUTPATIENT
Start: 2022-05-19

## 2022-06-02 LAB
25(OH)D3+25(OH)D2 SERPL-MCNC: 37.3 NG/ML (ref 30–100)
ALBUMIN SERPL-MCNC: 4.3 G/DL (ref 3.7–4.7)
ALBUMIN/CREAT UR: 24 MG/G CREAT (ref 0–29)
ALBUMIN/GLOB SERPL: 1.8 {RATIO} (ref 1.2–2.2)
ALP SERPL-CCNC: 146 IU/L (ref 44–121)
ALT SERPL-CCNC: 9 IU/L (ref 0–32)
AST SERPL-CCNC: 9 IU/L (ref 0–40)
BASOPHILS # BLD AUTO: 0.1 X10E3/UL (ref 0–0.2)
BASOPHILS NFR BLD AUTO: 1 %
BILIRUB SERPL-MCNC: 0.3 MG/DL (ref 0–1.2)
BUN SERPL-MCNC: 15 MG/DL (ref 8–27)
BUN/CREAT SERPL: 10 (ref 12–28)
CALCIUM SERPL-MCNC: 10.3 MG/DL (ref 8.7–10.3)
CHLORIDE SERPL-SCNC: 99 MMOL/L (ref 96–106)
CHOLEST SERPL-MCNC: 189 MG/DL (ref 100–199)
CO2 SERPL-SCNC: 19 MMOL/L (ref 20–29)
CREAT SERPL-MCNC: 1.48 MG/DL (ref 0.57–1)
CREAT UR-MCNC: 123 MG/DL
EGFR: 37 ML/MIN/1.73
EOSINOPHIL # BLD AUTO: 0.2 X10E3/UL (ref 0–0.4)
EOSINOPHIL NFR BLD AUTO: 2 %
ERYTHROCYTE [DISTWIDTH] IN BLOOD BY AUTOMATED COUNT: 13.8 % (ref 11.7–15.4)
EST. AVERAGE GLUCOSE BLD GHB EST-MCNC: 177 MG/DL
GLOBULIN SER CALC-MCNC: 2.4 G/DL (ref 1.5–4.5)
GLUCOSE SERPL-MCNC: 178 MG/DL (ref 65–99)
HBA1C MFR BLD: 7.8 % (ref 4.8–5.6)
HCT VFR BLD AUTO: 42.2 % (ref 34–46.6)
HDLC SERPL-MCNC: 46 MG/DL
HGB BLD-MCNC: 13.6 G/DL (ref 11.1–15.9)
IMM GRANULOCYTES # BLD AUTO: 0 X10E3/UL (ref 0–0.1)
IMM GRANULOCYTES NFR BLD AUTO: 0 %
LDLC SERPL CALC-MCNC: 98 MG/DL (ref 0–99)
LYMPHOCYTES # BLD AUTO: 2 X10E3/UL (ref 0.7–3.1)
LYMPHOCYTES NFR BLD AUTO: 21 %
MCH RBC QN AUTO: 29.1 PG (ref 26.6–33)
MCHC RBC AUTO-ENTMCNC: 32.2 G/DL (ref 31.5–35.7)
MCV RBC AUTO: 90 FL (ref 79–97)
MICROALBUMIN UR-MCNC: 29 UG/ML
MONOCYTES # BLD AUTO: 1 X10E3/UL (ref 0.1–0.9)
MONOCYTES NFR BLD AUTO: 11 %
NEUTROPHILS # BLD AUTO: 6.1 X10E3/UL (ref 1.4–7)
NEUTROPHILS NFR BLD AUTO: 65 %
PLATELET # BLD AUTO: 354 X10E3/UL (ref 150–450)
POTASSIUM SERPL-SCNC: 4.4 MMOL/L (ref 3.5–5.2)
PROT SERPL-MCNC: 6.7 G/DL (ref 6–8.5)
RBC # BLD AUTO: 4.67 X10E6/UL (ref 3.77–5.28)
RETICS/RBC NFR AUTO: 1.6 % (ref 0.6–2.6)
SODIUM SERPL-SCNC: 140 MMOL/L (ref 134–144)
T4 FREE SERPL-MCNC: 1.42 NG/DL (ref 0.82–1.77)
TRIGL SERPL-MCNC: 265 MG/DL (ref 0–149)
TSH SERPL DL<=0.005 MIU/L-ACNC: 3.36 UIU/ML (ref 0.45–4.5)
VLDLC SERPL CALC-MCNC: 45 MG/DL (ref 5–40)
WBC # BLD AUTO: 9.4 X10E3/UL (ref 3.4–10.8)

## 2022-06-09 ENCOUNTER — HOSPITAL ENCOUNTER (OUTPATIENT)
Dept: CT IMAGING | Age: 72
Discharge: HOME | End: 2022-06-09
Attending: INTERNAL MEDICINE
Payer: MEDICARE

## 2022-06-09 DIAGNOSIS — R91.1 LUNG NODULE: ICD-10-CM

## 2022-06-09 DIAGNOSIS — J44.9 COPD (CHRONIC OBSTRUCTIVE PULMONARY DISEASE) (HCC): ICD-10-CM

## 2022-06-09 PROCEDURE — 71250 CT THORAX DX C-: CPT

## 2022-06-10 DIAGNOSIS — E03.9 HYPOTHYROIDISM, UNSPECIFIED TYPE: ICD-10-CM

## 2022-06-10 DIAGNOSIS — I10 ESSENTIAL HYPERTENSION: Primary | ICD-10-CM

## 2022-06-15 RX ORDER — ROSUVASTATIN CALCIUM 20 MG/1
20 TABLET, COATED ORAL
Qty: 90 TABLET | Refills: 1 | Status: SHIPPED | OUTPATIENT
Start: 2022-06-15 | End: 2022-08-02 | Stop reason: DRUGHIGH

## 2022-06-15 RX ORDER — LEVOTHYROXINE SODIUM 100 UG/1
100 TABLET ORAL
Qty: 90 TABLET | Refills: 1 | Status: SHIPPED | OUTPATIENT
Start: 2022-06-15

## 2022-06-25 NOTE — OP NOTES
[address] [facility]  OPERATIVE REPORT    Name:  Stewart Milan  MR#:  [id number]  :  1950  ACCOUNT #:  [de-identified]  DATE OF SERVICE:  2020    PREOPERATIVE DIAGNOSIS:  Left shoulder rotator cuff tear. POSTOPERATIVE DIAGNOSIS:  Left shoulder rotator cuff tear with biceps tendonitis. PROCEDURE PERFORMED:  Left shoulder arthroscopic acromioplasty, mini-open rotator cuff tear repair with biceps tenotomy. SURGEON:  Colleen Ashraf MD    ASSISTANT:  Fidelia Sales PA-C    ANESTHESIA:  Regional plus general.    COMPLICATIONS:  None. SPECIMENS REMOVED:  None. IMPLANTS:  Arthrex anchors x4. ESTIMATED BLOOD LOSS:  Minimal.    INDICATIONS:  A 40-year-old woman with chronic progressive left shoulder pain not responsive to conservative management with a positive MRI scan. OPERATION:  The patient was seen in the preoperative area and underwent placement of regional anesthesia. She was taken to the operating room and underwent general inhalational anesthesia. She was placed in lateral position with left shoulder up. Left shoulder and arm were prepped and draped in usual fashion. 10-pound longitudinal traction was applied. Posterior portal was established in the glenohumeral joint. Glenohumeral joint was carefully evaluated. Grade 2 changes were noted on the glenoid. Grade II changes were also noted on the humeral head, however, nothing warranted debridement. Labrum was degenerated, but was intact. A full-thickness tear was noted on the undersurface of the rotator cuff at the insertion of the supraspinatus and involved the biceps tendon. There was significant tenosynovitis around the biceps tendon with some fraying. I removed the posterior portal and established direct lateral portal in the subacromial space. I debrided the avulsion site of the supraspinatus. The biceps tendon was in the middle of the avulsion site and was fully exposed.   There was significant fraying Call placed to on call provider per pt request for an actual count on her CK.  Dr. Josephine Bermudez ok w/ this, lab contacted and value of 47,254 CK given there.  I burred down the avulsion site leaving healthy bleeding subchondral bone. I then used electrocautery device to release the coracoacromial ligament and cleared the undersurface of the acromion. I then introduced the helical cutter and proceeded with a stepwise acromioplasty. I co-planed the AC joint. I then removed all portals and saline solution. Lateral portal was extended for a mini-open approach. Deltoid fibers were split longitudinally. Self-retaining retractor was placed. Again, the biceps tendon was carefully evaluated. My concern was that the tendon was in the middle of the defect of the supraspinatus and would be a potential source of pain going forward if I left it in place and repaired the rotator cuff over the biceps tendon. I therefore elected to do a open biceps tenotomy. The supraspinatus tendon was easily advanced out laterally. I placed 2 medial anchors with FiberWire tape and FiberWire suture attached. A free needle was used to place all of the FiberWire tape and FiberWire sutures in a sequential fashion through the medial aspect of the supraspinatus. FiberWire tape was used to advance the supraspinatus out laterally to the greater tuberosity. Two anchors were used to lock down the 2 sets of FiberWire tape forming a nice solid repair. FiberWire sutures were tied down thereby reestablishing the footprint. We had a nice solid repair. The wounds were extensively irrigated with saline solution. Deltoid fascia was repaired using #1 Vicryl interrupted figure-of-eight fashion. Subcutaneous tissue was approximated using 2-0 Vicryl interrupted fashion. Skin edges were approximated using 3-0 Monocryl in running subcuticular fashion followed by Dermabond. Sterile dressing was applied, and the patient was awakened, extubated and transferred to recovery room in stable condition.   The physician assistant was critical throughout the case in assisting with suture management as well as retraction and manipulation of the shoulder and wound closure.       Destiny Foster MD      [id number]  D:  07/07/2020 11:37  T:  07/07/2020 13:54  JOB #:  [id number]

## 2022-08-02 DIAGNOSIS — E78.2 MIXED HYPERLIPIDEMIA: Primary | ICD-10-CM

## 2022-08-02 PROBLEM — G47.30 SLEEP APNEA: Status: ACTIVE | Noted: 2022-08-02

## 2022-08-02 PROBLEM — E78.5 HYPERLIPIDEMIA: Status: ACTIVE | Noted: 2022-08-02

## 2022-08-02 PROBLEM — C50.919 MALIGNANT TUMOR OF BREAST (HCC): Status: RESOLVED | Noted: 2020-09-22 | Resolved: 2022-08-02

## 2022-08-02 PROBLEM — C80.1 MALIGNANT NEOPLASM (HCC): Status: ACTIVE | Noted: 2022-08-02

## 2022-08-02 RX ORDER — ROSUVASTATIN CALCIUM 40 MG/1
40 TABLET, COATED ORAL
Qty: 90 TABLET | Refills: 1 | Status: SHIPPED | OUTPATIENT
Start: 2022-08-02

## 2022-08-02 NOTE — PROGRESS NOTES
Triglycerides not at goal. Increased dose of rosuvastatin and will need to keep a close eye on her kidney function because there is renal dosing if it comes to that. Pt notified via [address]

## 2022-08-09 ENCOUNTER — HOSPITAL ENCOUNTER (OUTPATIENT)
Dept: MAMMOGRAPHY | Age: 72
Discharge: HOME | End: 2022-08-09
Attending: SURGERY
Payer: MEDICARE

## 2022-08-09 DIAGNOSIS — N63.20 LEFT BREAST LUMP: ICD-10-CM

## 2022-08-09 DIAGNOSIS — Z85.3 HISTORY OF BREAST CANCER: ICD-10-CM

## 2022-08-09 DIAGNOSIS — R92.8 ABNORMAL MAMMOGRAM: ICD-10-CM

## 2022-08-09 PROCEDURE — 77062 BREAST TOMOSYNTHESIS BI: CPT

## 2022-08-10 ENCOUNTER — PATIENT MESSAGE (OUTPATIENT)
Dept: FAMILY MEDICINE CLINIC | Age: 72
End: 2022-08-10

## 2022-08-10 DIAGNOSIS — R94.4 KIDNEY FUNCTION TEST ABNORMAL: Primary | ICD-10-CM

## 2022-08-16 LAB
ALBUMIN SERPL-MCNC: 4.3 G/DL (ref 3.7–4.7)
ALBUMIN/GLOB SERPL: 1.7 {RATIO} (ref 1.2–2.2)
ALP SERPL-CCNC: 169 IU/L (ref 44–121)
ALT SERPL-CCNC: 12 IU/L (ref 0–32)
AST SERPL-CCNC: 15 IU/L (ref 0–40)
BILIRUB SERPL-MCNC: <0.2 MG/DL (ref 0–1.2)
BUN SERPL-MCNC: 11 MG/DL (ref 8–27)
BUN/CREAT SERPL: 9 (ref 12–28)
CALCIUM SERPL-MCNC: 10.3 MG/DL (ref 8.7–10.3)
CHLORIDE SERPL-SCNC: 101 MMOL/L (ref 96–106)
CO2 SERPL-SCNC: 23 MMOL/L (ref 20–29)
CREAT SERPL-MCNC: 1.21 MG/DL (ref 0.57–1)
EGFR: 47 ML/MIN/1.73
GLOBULIN SER CALC-MCNC: 2.6 G/DL (ref 1.5–4.5)
GLUCOSE SERPL-MCNC: 126 MG/DL (ref 65–99)
POTASSIUM SERPL-SCNC: 4.8 MMOL/L (ref 3.5–5.2)
PROT SERPL-MCNC: 6.9 G/DL (ref 6–8.5)
SODIUM SERPL-SCNC: 141 MMOL/L (ref 134–144)

## 2022-08-18 ENCOUNTER — OFFICE VISIT (OUTPATIENT)
Dept: FAMILY MEDICINE CLINIC | Age: 72
End: 2022-08-18
Payer: MEDICARE

## 2022-08-18 VITALS
OXYGEN SATURATION: 97 % | HEART RATE: 90 BPM | WEIGHT: 224 LBS | DIASTOLIC BLOOD PRESSURE: 69 MMHG | TEMPERATURE: 97.1 F | SYSTOLIC BLOOD PRESSURE: 136 MMHG | BODY MASS INDEX: 38.24 KG/M2 | RESPIRATION RATE: 20 BRPM | HEIGHT: 64 IN

## 2022-08-18 DIAGNOSIS — R74.8 ELEVATED ALKALINE PHOSPHATASE LEVEL: ICD-10-CM

## 2022-08-18 DIAGNOSIS — N18.31 TYPE 2 DIABETES MELLITUS WITH STAGE 3A CHRONIC KIDNEY DISEASE, WITHOUT LONG-TERM CURRENT USE OF INSULIN (HCC): Primary | ICD-10-CM

## 2022-08-18 DIAGNOSIS — F33.1 MODERATE EPISODE OF RECURRENT MAJOR DEPRESSIVE DISORDER (HCC): ICD-10-CM

## 2022-08-18 DIAGNOSIS — E03.9 ACQUIRED HYPOTHYROIDISM: ICD-10-CM

## 2022-08-18 DIAGNOSIS — E78.00 PURE HYPERCHOLESTEROLEMIA: ICD-10-CM

## 2022-08-18 DIAGNOSIS — E55.9 VITAMIN D DEFICIENCY: ICD-10-CM

## 2022-08-18 DIAGNOSIS — E11.22 TYPE 2 DIABETES MELLITUS WITH STAGE 3A CHRONIC KIDNEY DISEASE, WITHOUT LONG-TERM CURRENT USE OF INSULIN (HCC): Primary | ICD-10-CM

## 2022-08-18 DIAGNOSIS — G47.09 OTHER INSOMNIA: ICD-10-CM

## 2022-08-18 PROCEDURE — 1090F PRES/ABSN URINE INCON ASSESS: CPT | Performed by: NURSE PRACTITIONER

## 2022-08-18 PROCEDURE — 1101F PT FALLS ASSESS-DOCD LE1/YR: CPT | Performed by: NURSE PRACTITIONER

## 2022-08-18 PROCEDURE — 3051F HG A1C>EQUAL 7.0%<8.0%: CPT | Performed by: NURSE PRACTITIONER

## 2022-08-18 PROCEDURE — G8754 DIAS BP LESS 90: HCPCS | Performed by: NURSE PRACTITIONER

## 2022-08-18 PROCEDURE — 3017F COLORECTAL CA SCREEN DOC REV: CPT | Performed by: NURSE PRACTITIONER

## 2022-08-18 PROCEDURE — G9717 DOC PT DX DEP/BP F/U NT REQ: HCPCS | Performed by: NURSE PRACTITIONER

## 2022-08-18 PROCEDURE — G8417 CALC BMI ABV UP PARAM F/U: HCPCS | Performed by: NURSE PRACTITIONER

## 2022-08-18 PROCEDURE — G8752 SYS BP LESS 140: HCPCS | Performed by: NURSE PRACTITIONER

## 2022-08-18 PROCEDURE — G8536 NO DOC ELDER MAL SCRN: HCPCS | Performed by: NURSE PRACTITIONER

## 2022-08-18 PROCEDURE — G8427 DOCREV CUR MEDS BY ELIG CLIN: HCPCS | Performed by: NURSE PRACTITIONER

## 2022-08-18 PROCEDURE — 99214 OFFICE O/P EST MOD 30 MIN: CPT | Performed by: NURSE PRACTITIONER

## 2022-08-18 PROCEDURE — G8399 PT W/DXA RESULTS DOCUMENT: HCPCS | Performed by: NURSE PRACTITIONER

## 2022-08-18 PROCEDURE — 2022F DILAT RTA XM EVC RTNOPTHY: CPT | Performed by: NURSE PRACTITIONER

## 2022-08-18 PROCEDURE — 1123F ACP DISCUSS/DSCN MKR DOCD: CPT | Performed by: NURSE PRACTITIONER

## 2022-08-18 PROCEDURE — G9899 SCRN MAM PERF RSLTS DOC: HCPCS | Performed by: NURSE PRACTITIONER

## 2022-08-18 RX ORDER — HYDROXYZINE PAMOATE 50 MG/1
CAPSULE ORAL
Qty: 180 CAPSULE | Refills: 1 | Status: SHIPPED | OUTPATIENT
Start: 2022-08-18

## 2022-08-18 RX ORDER — GLIPIZIDE 5 MG/1
TABLET ORAL
Qty: 90 TABLET | Refills: 1 | Status: SHIPPED | OUTPATIENT
Start: 2022-08-18

## 2022-08-18 NOTE — PROGRESS NOTES
Chief Complaint   Patient presents with    Follow-up    Hypertension    Diabetes       Visit Vitals  /69 (BP 1 Location: Left upper arm, BP Patient Position: Sitting, BP Cuff Size: Adult)   Pulse 90   Temp 97.1 °F (36.2 °C) (Temporal)   Resp 20   Ht 5' 4\" (1.626 m)   Wt 224 lb (101.6 kg)   SpO2 97%   BMI 38.45 kg/m²       1. \"Have you been to the ER, urgent care clinic since your last visit? Hospitalized since your last visit? \" No    2. \"Have you seen or consulted any other health care providers outside of the [address] [place] [address] since your last visit? \"  [facility], and [facility]. 3. For patients aged 39-70: Has the patient had a colonoscopy / FIT/ Cologuard? Yes - no Care Gap present      If the patient is female:    4. For patients aged 41-77: Has the patient had a mammogram within the past 2 years? Yes - no Care Gap present      5. For patients aged 21-65: Has the patient had a pap smear?  NA - based on age or sex

## 2022-08-18 NOTE — PROGRESS NOTES
Chief Complaint   Patient presents with    Follow-up    Hypertension    Diabetes     Visit Vitals  /69 (BP 1 Location: Left upper arm, BP Patient Position: Sitting, BP Cuff Size: Adult)   Pulse 90   Temp 97.1 °F (36.2 °C) (Temporal)   Resp 20   Ht 5' 4\" (1.626 m)   Wt 224 lb (101.6 kg)   SpO2 97%   BMI 38.45 kg/m²     Subjective  Diana Cleveland is a 66 y.o. female. HPI:  Patient presented for follow-up. All lab results for recent labs reviewed at appt. Pt stated she was not aware she has CKD. Patient has many comorbidities to include type 2 diabetes with neuropathy, COPD, hypertension, hypothyroidism, hypercholesterolemia, KARIS, osteoarthritis in multiple joints, depression, vitamin D deficiency history of right breast cancer, tobacco dependence, and morbid obesity. CKD Stage IIIa- last metabolic panel w/ improved creatinine @ 1.21 and eGFR @ 47. She was advised to keep well hydrated and stressed importance of good B/P control and control of diabetes which currently is not controlled. Type 2 DM, uncontrolled-  A1c currently 7.8 % on 6/1/2022 vs 5.8% on 4/28/2021 vs 6.4% on 10/1/2020 vs 6.3% on 6/23/2020. Pt is disappointed because she has been working hard on her diet and has lost 26 pounds since 11/18/2021. Pt cannot afford newer diabetic medications. Discussed adding on glipizide 5 mg before breakfast and she was agreeable. Cautioned her about making sure she eats after taking glipizide due to risk of hypoglycemia. Stated understanding. B/P acceptable in office at 136/69. Will continue on amlodipine 10 mg daily. Also taking Lasix 40 mg daily for edema. 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 COPD- pt is under care of pulmonologist Dr. Jennifer Barnes. Stable status and using Trelegy inhaler. Remote hx of R breast cancer.  Had a biopsy of L breast in upper outer quadrant and then concern via pathological report for necrotizing and suppurative granulomatous inflammation at site of bipsy. Spot compression views done on L breast on 8/9/2022 . Impression was for probable benign post surgical excisional biopsy change in the posterior L breast and no evidence of malignancy. Recommendation was for diangnostic L mammogram in 6 mos to confirm expected post surgical evolution of excisional biopsy site. She had follow up visit w/ oncologist Dr. Yahaira Stone on 7/26/2022. Stable. She will continue on anastrozole 1 mg daily and regular follow up with Dr. Yahaira Stone. Patient Active Problem List   Diagnosis Code    Nontraumatic complete tear of left rotator cuff M75.122    GERD (gastroesophageal reflux disease) K21.9    Arthritis M19.90    Depressive disorder F32. A    Essential hypertension I10    Hypothyroidism E03.9    Pure hypercholesterolemia E78.00    Severe obesity (HCC) E66.01    Screening for alcoholism Z13.39    Screening for depression Z13.31    Chronic obstructive pulmonary disease (HCC) J44.9    Type 2 diabetes mellitus with chronic kidney disease (HCC) E11.22    Sleep apnea G47.30    Hyperlipidemia E78.5    Malignant neoplasm (HCC) C80.1    Moderate episode of recurrent major depressive disorder ([address] F33.1     Past Medical History:   Diagnosis Date    Arthritis     Asthma     Cancer ([address]     BREAST CANCER RIGHT BREAST LUMPECTOMY     Chronic pain     COPD (chronic obstructive pulmonary disease) (HCC)     Depressive disorder 9/22/2020    Diabetes ([address]     Essential hypertension 9/22/2020    GERD (gastroesophageal reflux disease)     Hypothyroidism 9/22/2020    Malignant tumor of breast ([address] 9/22/2020    Osteoarthritis 9/22/2020    Pure hypercholesterolemia 9/22/2020    Sleep apnea     Thyroid disease 1980    REMOVED     Type 2 diabetes mellitus without complication ([address] 7/66/6310     Past Surgical History:   Procedure Laterality Date    HX CHOLECYSTECTOMY  2013    HX COLONOSCOPY  10/20/2017    HX COLONOSCOPY  2014    HX COLONOSCOPY  2007    HX HEENT      HX HYMENECTOMY HX ORTHOPAEDIC Left 07/07/2020    left rotator cuff    IN BREAST SURGERY PROCEDURE UNLISTED Right 02/2016    LUMPECTOMY     IN MASTECTOMY, PARTIAL Right 2016     Current Outpatient Medications   Medication Instructions    amLODIPine (NORVASC) 10 mg, Oral, DAILY    anastrozole (ARIMIDEX) 1 mg, Oral, DAILY    aspirin delayed-release 81 mg, Oral, DAILY    cholecalciferol (VITAMIN D3) 5,000 Units, Oral, DAILY    furosemide (LASIX) 40 mg tablet One tablet daily by oral route    glipiZIDE (GLUCOTROL) 5 mg tablet Take 1 tab by mouth 30 min prior to breakfast daily for diabetes. hydrOXYzine pamoate (VISTARIL) 50 mg capsule Take 1-2 capsules by mouth at least 30 min prior to bedtime for insomnia.    levothyroxine (SYNTHROID) 100 mcg, Oral, DAILY BEFORE BREAKFAST    omega-3 fatty acids/dha/epa (MEGARED PLANT-OMEGA-3 PO) 800 mg, Oral, DAILY    pantoprazole (PROTONIX) 40 mg tablet TAKE 1 TABLET BY MOUTH EVERY DAY    rosuvastatin (CRESTOR) 40 mg, Oral, EVERY BEDTIME    SITagliptin (JANUVIA) 100 mg, Oral, DAILY, Stop janumet-we are stopping metformin altogether    traZODone (DESYREL) 100 mg, Oral, EVERY BEDTIME, As needed for sleep.     Trelegy Ellipta 100-62.5-25 mcg inhaler INHALE 1 PUFF BY MOUTH EVERY DAY    venlafaxine-SR (EFFEXOR-XR) 150 mg, Oral, DAILY    vitamin E (AQUA GEMS) 400 Units, Oral, DAILY     Allergies   Allergen Reactions    Ampicillin Unknown (comments)    Erythromycin Base Unknown (comments)    Other Medication Other (comments)     TUBERCULOSIS  PT GETS CELLULITIS            Parafon Forte Dsc [Chlorzoxazone] Unknown (comments)     Social History     Tobacco Use    Smoking status: Every Day     Packs/day: 1.00     Years: 50.00     Pack years: 50.00     Types: Cigarettes    Smokeless tobacco: Never   Vaping Use    Vaping Use: Never used   Substance Use Topics    Alcohol use: Not Currently    Drug use: Never     Family History   Problem Relation Age of Onset    Thyroid Disease Mother     Heart Disease Mother     Hypertension Mother     Heart Attack Mother     Cancer Father         SPINE     Other Father         COMMITTED SUICIDE     Diabetes Brother     Heart Disease Brother     Diabetes Sister     Cancer Sister     Diabetes Brother     Heart Disease Brother     Diabetes Brother     Cancer Brother     Lung Disease Brother     Anesth Problems Neg Hx      Review of Systems   Constitutional:  Negative for chills, fever and malaise/fatigue. HENT:  Positive for hearing loss. Negative for congestion, ear pain and sore throat. Respiratory:  Positive for cough and shortness of breath. Negative for wheezing. Cardiovascular:  Negative for chest pain, palpitations and leg swelling. Gastrointestinal:  Positive for heartburn. Negative for abdominal pain, constipation, diarrhea, nausea and vomiting. Genitourinary:  Negative for dysuria and frequency. Musculoskeletal:  Positive for joint pain. Negative for back pain, falls, myalgias and neck pain. R knee   Neurological:  Positive for dizziness and headaches. Negative for tingling, sensory change and weakness. Ocassional   Psychiatric/Behavioral:  Negative for depression. The patient has insomnia. The patient is not nervous/anxious. Objective  Physical Exam  Vitals reviewed. Constitutional:       General: She is not in acute distress. Appearance: Normal appearance. She is obese. HENT:      Head: Normocephalic. Right Ear: External ear normal.      Left Ear: External ear normal.      Nose: Nose normal.   Eyes:      Conjunctiva/sclera: Conjunctivae normal.   Neck:      Vascular: No carotid bruit. Cardiovascular:      Rate and Rhythm: Normal rate and regular rhythm. Heart sounds: Normal heart sounds. No murmur heard. Pulmonary:      Effort: Pulmonary effort is normal. No respiratory distress. Breath sounds: Normal breath sounds. Musculoskeletal:         General: No swelling or tenderness. Normal range of motion. Cervical back: Neck supple. Right lower leg: No edema. Left lower leg: No edema. Skin:     General: Skin is warm and dry. Coloration: Skin is not jaundiced. Findings: No lesion or rash. Neurological:      Mental Status: She is alert and oriented to person, place, and time. Motor: No weakness. Gait: Gait normal.   Psychiatric:         Mood and Affect: Mood normal.         Behavior: Behavior normal.         Thought Content: Thought content normal.         Judgment: Judgment normal.       Assessment & Plan      ICD-10-CM ICD-9-CM    1. Type 2 diabetes mellitus with stage 3a chronic kidney disease, without long-term current use of insulin ([facility])  E11.22 250.40 glipiZIDE (GLUCOTROL) 5 mg tablet    W16.79 282.1 METABOLIC PANEL, COMPREHENSIVE      HEMOGLOBIN A1C WITH EAG      MICROALBUMIN, UR, RAND W/ MICROALB/CREAT RATIO      2. Moderate episode of recurrent major depressive disorder (HCC)  F33.1 296.32       3. Acquired hypothyroidism  E03.9 244.9 TSH 3RD GENERATION      T4, FREE      4. Pure hypercholesterolemia  E78.00 272.0 CBC WITH AUTOMATED DIFF      RETICULOCYTE COUNT      LIPID PANEL      5. Other insomnia  G47.09 780.52 hydrOXYzine pamoate (VISTARIL) 50 mg capsule      6. Vitamin D deficiency  E55.9 268.9 VITAMIN D, 25 HYDROXY      7. Elevated alkaline phosphatase level  R74.8 790.5 GGT      PTH INTACT          1. Type 2 diabetes mellitus with stage 3a chronic kidney disease, without long-term current use of insulin ([address]  Patient will do lab work prior to next appointment in November 2022. We will adjust the glipizide to twice a day if not at goal.    - glipiZIDE (GLUCOTROL) 5 mg tablet; Take 1 tab by mouth 30 min prior to breakfast daily for diabetes. Dispense: 90 Tablet; Refill: 1  - METABOLIC PANEL, COMPREHENSIVE; Future  - HEMOGLOBIN A1C WITH EAG; Future  - MICROALBUMIN, UR, RAND W/ MICROALB/CREAT RATIO; Future    2.  Moderate episode of recurrent major depressive disorder ([facility]  Stable on current medication regimen of venlafaxine SR 10 mg daily. 3. Acquired hypothyroidism  Labs stable. Will continue on levothyroxin 100 mcg daily.     - TSH 3RD GENERATION; Future  - T4, FREE; Future    4. Pure hypercholesterolemia  Last labs not at goal for triglycerides. Continue to monitor and pt will continue to work on losing weight.     - CBC WITH AUTOMATED DIFF; Future  - RETICULOCYTE COUNT; Future  - LIPID PANEL; Future    5. Other insomnia  Trazodone not working very well for pt. Pt was agreeable to trying Vistaril. Advised not to take trazodone if taking vistaril. Also advised to only take if has at least  hours before anticipated rising in am to avoid daytime grogginess. - hydrOXYzine pamoate (VISTARIL) 50 mg capsule; Take 1-2 capsules by mouth at least 30 min prior to bedtime for insomnia. Dispense: 180 Capsule; Refill: 1    6. Vitamin D deficiency  Currently stable and at goal.     - VITAMIN D, 25 HYDROXY; Future    7. Elevated alkaline phosphatase level  Calcium level normal, thyroid labs good, Vitamin D level good. Will add on labs to try to differentiate bone vs hepato-biliary cause.   - GGT; Future  - PTH INTACT; Future     I have discussed the diagnosis with the patient and the intended plan as seen in the above orders. Pt/caretaker has expressed understanding. Questions were answered concerning future plans. I have discussed medication side effects and warnings as indicated with the patient as well.     Cadence Ortega NP

## 2022-09-06 DIAGNOSIS — K21.9 GASTROESOPHAGEAL REFLUX DISEASE WITHOUT ESOPHAGITIS: ICD-10-CM

## 2022-09-07 RX ORDER — PANTOPRAZOLE SODIUM 40 MG/1
40 TABLET, DELAYED RELEASE ORAL
Qty: 90 TABLET | Refills: 1 | Status: SHIPPED | OUTPATIENT
Start: 2022-09-07

## 2022-09-07 NOTE — TELEPHONE ENCOUNTER
Last OV: 8/18/22  Next OV: 11/22/22  Last Refill: 9/13/21 (QTY 90, refills 3)      Will send to provider for review/authorization

## 2022-09-07 NOTE — PROGRESS NOTES
Separate email sent to pt regarding lab work. She will get a full panel prior to her appt in Nov 2022.

## 2022-09-09 ENCOUNTER — TELEPHONE (OUTPATIENT)
Dept: FAMILY MEDICINE CLINIC | Age: 72
End: 2022-09-09

## 2022-09-09 NOTE — TELEPHONE ENCOUNTER
[facility] sent over a 90 day supply presccription request for Januvia.     Qty:90 day supply    Last Refilled: 8/11/22    LOV 8/18 FOV 11/22

## 2022-09-09 NOTE — TELEPHONE ENCOUNTER
Last OV: 8/18/22  Next OV: 11/22/22  Last Refill: 8/10/22    90 day supply recent sent to [facility] w/ a refill.  Please call pt and check if they are ok with the fill to [facility], or are they switching to [facility] (mail order)

## 2022-10-05 NOTE — PROGRESS NOTES
Pt was seen for an appt on 8/18/2022 and all lab results from 6/1/2022 were discussed and treatment plan changed if indicated.

## 2022-10-10 ENCOUNTER — TELEPHONE (OUTPATIENT)
Dept: FAMILY MEDICINE CLINIC | Age: 72
End: 2022-10-10

## 2022-11-21 ENCOUNTER — TELEPHONE (OUTPATIENT)
Dept: FAMILY MEDICINE CLINIC | Age: 72
End: 2022-11-21

## 2022-11-21 NOTE — TELEPHONE ENCOUNTER
Called patient mailbox full. Calling to remind patient of their appointment on 17/70 with Merced Stokes.

## 2022-11-22 ENCOUNTER — OFFICE VISIT (OUTPATIENT)
Dept: FAMILY MEDICINE CLINIC | Age: 72
End: 2022-11-22
Payer: MEDICARE

## 2022-11-22 VITALS
SYSTOLIC BLOOD PRESSURE: 116 MMHG | WEIGHT: 229 LBS | DIASTOLIC BLOOD PRESSURE: 62 MMHG | HEIGHT: 64 IN | OXYGEN SATURATION: 96 % | RESPIRATION RATE: 18 BRPM | BODY MASS INDEX: 39.09 KG/M2 | HEART RATE: 78 BPM | TEMPERATURE: 97.5 F

## 2022-11-22 DIAGNOSIS — Z85.3 HISTORY OF BREAST CANCER: ICD-10-CM

## 2022-11-22 DIAGNOSIS — G47.30 SLEEP APNEA, UNSPECIFIED TYPE: ICD-10-CM

## 2022-11-22 DIAGNOSIS — I10 PRIMARY HYPERTENSION: ICD-10-CM

## 2022-11-22 DIAGNOSIS — J44.9 CHRONIC OBSTRUCTIVE PULMONARY DISEASE, UNSPECIFIED COPD TYPE (HCC): ICD-10-CM

## 2022-11-22 DIAGNOSIS — Z76.89 ENCOUNTER TO ESTABLISH CARE WITH NEW DOCTOR: ICD-10-CM

## 2022-11-22 DIAGNOSIS — E11.22 TYPE 2 DIABETES MELLITUS WITH CHRONIC KIDNEY DISEASE, WITHOUT LONG-TERM CURRENT USE OF INSULIN, UNSPECIFIED CKD STAGE (HCC): Primary | ICD-10-CM

## 2022-11-22 DIAGNOSIS — E78.5 HYPERLIPIDEMIA, UNSPECIFIED HYPERLIPIDEMIA TYPE: ICD-10-CM

## 2022-11-22 DIAGNOSIS — Z78.0 ASYMPTOMATIC MENOPAUSAL STATE: ICD-10-CM

## 2022-11-22 DIAGNOSIS — E03.9 HYPOTHYROIDISM, UNSPECIFIED TYPE: ICD-10-CM

## 2022-11-22 DIAGNOSIS — K21.9 GASTROESOPHAGEAL REFLUX DISEASE WITHOUT ESOPHAGITIS: ICD-10-CM

## 2022-11-22 DIAGNOSIS — F33.1 MODERATE EPISODE OF RECURRENT MAJOR DEPRESSIVE DISORDER (HCC): ICD-10-CM

## 2022-11-22 PROBLEM — E78.00 PURE HYPERCHOLESTEROLEMIA: Status: RESOLVED | Noted: 2020-09-22 | Resolved: 2022-11-22

## 2022-11-22 PROBLEM — Z13.31 SCREENING FOR DEPRESSION: Status: RESOLVED | Noted: 2020-09-24 | Resolved: 2022-11-22

## 2022-11-22 PROBLEM — Z13.39 SCREENING FOR ALCOHOLISM: Status: RESOLVED | Noted: 2020-09-24 | Resolved: 2022-11-22

## 2022-11-22 PROBLEM — F32.A DEPRESSION: Status: ACTIVE | Noted: 2020-09-22

## 2022-11-22 LAB — HBA1C MFR BLD HPLC: 7.7 %

## 2022-11-22 RX ORDER — AMLODIPINE BESYLATE 10 MG/1
10 TABLET ORAL DAILY
Qty: 90 TABLET | Refills: 1 | Status: SHIPPED | OUTPATIENT
Start: 2022-11-22

## 2022-11-22 RX ORDER — FUROSEMIDE 20 MG/1
20 TABLET ORAL DAILY
Qty: 90 TABLET | Refills: 1 | Status: SHIPPED | OUTPATIENT
Start: 2022-11-22

## 2022-11-22 RX ORDER — VENLAFAXINE HYDROCHLORIDE 150 MG/1
150 CAPSULE, EXTENDED RELEASE ORAL DAILY
Qty: 90 CAPSULE | Refills: 1 | Status: SHIPPED | OUTPATIENT
Start: 2022-11-22

## 2022-11-22 NOTE — PATIENT INSTRUCTIONS
Please decrease trazodone to 50mg at bedtime for one week, followed by 50mg every other bedtime for one week. You can split your current pills in half.

## 2022-11-22 NOTE — PROGRESS NOTES
1. Have you been to the ER, urgent care clinic since your last visit? Hospitalized since your last visit? No    2. Have you seen or consulted any other health care providers outside of the [address] [place] [address] since your last visit? Include any pap smears or colon screening.  No    Chief Complaint   Patient presents with    Establish Care    Follow-up    Medication Refill     Visit Vitals  /62 (BP 1 Location: Left upper arm, BP Patient Position: Sitting, BP Cuff Size: Large adult)   Pulse 78   Temp 97.5 °F (36.4 °C) (Temporal)   Resp 18   Ht 5' 4\" (1.626 m)   Wt 229 lb (103.9 kg)   SpO2 96%   BMI 39.31 kg/m²

## 2022-11-22 NOTE — PROGRESS NOTES
Subjective  Chief Complaint   Patient presents with    [facility]    Follow-up    Medication Refill     HPI:  Karlos Clayton is a 66 y.o. female with medical problems as listed below who presents to [facility] care and follow up on chronic conditions. Blood work recently drawn per patient. Past medical history: HTN, GERD, DM2, CKD, hypothyroidism, COPD, sleep apnea, OA, depression, HLD, hx of breast cancer  Medications: pantoprazole, amlodipine, anastrozole, ASA, vitamin D3 5000 units, Lasix (daily), vitamin D3 5000 units? Daily, trazodone (but it's not working)  Allergies: See list.   Specialists: Dr. Tyra Epley (Pulmonology), Dr. Jorge Guerrero (Oncology). Dr. Isac Bermeo (General Surgery sees every 6 months). Dr. Nacho Thomas (OD) and [facility]. Surgical history: Cholecystectomy, left rotator cuff repair, right partial mastectomy  Family history: CAD/MI, HTN, thyroid disease (mom). Spinal cancer, suicide (dad). DM, CAD (multiple siblings). Ovarian cancer (sister). Lung cancer (brother). Social history: Current smoker, smoking 1 pack per day. She says she has cut back. No alcohol, no illicit drug use. Colon cancer screening: Last colonoscopy ~2 years ago. No polyps per patient report. Instructed to return in 3 years. Dr. Isac Bermeo performed. Breast cancer screening: Last mammogram in 08/2022, recommended to repeat left mammogram in 6 months. Patient does not wish to repeat mammogram in 6 months. She plans to return in 1 year. Cervical cancer screening: No longer gets Pap smears. Patient elects to stop screening. Osteoporosis screening: Last DEXA in 2020, no results available for review. Patient says it has been normal. Last ordered by Dr. Jorge Guerrero. Lung cancer screening: Dr. Tyra Epley orders CT scan annually per patient. No abnormal findings. Immunizations: COVID x3. Declines influenza vaccine. DM2: Last A1c 7.8 in 06/2022. Checks fasting glucose, this morning it was 167. Complicated by CKD. Currently taking glipizide and Januvia. Previously on metformin but developed diarrhea and weight loss. Overdue for foot exam and eye exam.   Sleep difficulty: Patient says trazodone is not working for her. Patient Active Problem List   Diagnosis Code    Nontraumatic complete tear of left rotator cuff M75.122    GERD (gastroesophageal reflux disease) K21.9    Arthritis M19.90    Depression F32. A    Essential hypertension I10    Hypothyroidism E03.9    Severe obesity ([facility]) E66.01    Chronic obstructive pulmonary disease ([facility]) J44.9    Type 2 diabetes mellitus with chronic kidney disease ([facility]) E11.22    Sleep apnea G47.30    Hyperlipidemia E78.5    History of breast cancer Z85.3     Family History   Problem Relation Age of Onset    Thyroid Disease Mother     Heart Disease Mother     Hypertension Mother     Heart Attack Mother     Cancer Father         SPINE     Other Father         COMMITTED SUICIDE     Diabetes Sister     Ovarian Cancer Sister     Diabetes Brother     Heart Disease Brother     Diabetes Brother     Heart Disease Brother     Lung Disease Brother     Diabetes Brother     Lung Cancer Brother     Anesth Problems Neg Hx      Social History     Tobacco Use    Smoking status: Every Day     Packs/day: 1.00     Years: 50.00     Pack years: 50.00     Types: Cigarettes    Smokeless tobacco: Never   Vaping Use    Vaping Use: Never used   Substance Use Topics    Alcohol use: Not Currently    Drug use: Never     Current Outpatient Medications on File Prior to Visit   Medication Sig Dispense Refill    pantoprazole (PROTONIX) 40 mg tablet Take 1 Tablet by mouth daily as needed (GERD). Indications: gastroesophageal reflux disease 90 Tablet 1    glipiZIDE (GLUCOTROL) 5 mg tablet Take 1 tab by mouth 30 min prior to breakfast daily for diabetes. 90 Tablet 1    SITagliptin (JANUVIA) 100 mg tablet Take 1 Tablet by mouth in the morning.  Stop janumet-we are stopping metformin altogether 90 Tablet 1    rosuvastatin (CRESTOR) 40 mg tablet Take 1 Tablet by mouth nightly. Indications: high cholesterol and high triglycerides 90 Tablet 1    levothyroxine (SYNTHROID) 100 mcg tablet Take 1 Tablet by mouth Daily (before breakfast). Indications: a condition with low thyroid hormone levels 90 Tablet 1    anastrozole (ARIMIDEX) 1 mg tablet Take 1 mg by mouth daily. aspirin delayed-release 81 mg tablet Take 81 mg by mouth daily. omega-3 fatty acids/dha/epa (MEGARED PLANT-OMEGA-3 PO) Take 800 mg by mouth daily. vitamin E (AQUA GEMS) 400 unit capsule Take 400 Units by mouth daily. cholecalciferol (VITAMIN D3) (5000 Units/125 mcg) tab tablet Take 5,000 Units by mouth daily. Trelegy Ellipta 100-62.5-25 mcg inhaler INHALE 1 PUFF BY MOUTH EVERY DAY       No current facility-administered medications on file prior to visit. Allergies   Allergen Reactions    Erythromycin Base Unknown (comments)    Other Medication Other (comments)     TUBERCULOSIS  PT GETS CELLULITIS            Parafon Forte Dsc [Chlorzoxazone] Unknown (comments)     Review of Systems   Constitutional:  Negative for chills and fever. Respiratory:  Negative for cough, shortness of breath and wheezing. Cardiovascular:  Negative for chest pain, palpitations and leg swelling. Gastrointestinal:  Negative for abdominal pain, constipation, diarrhea, nausea and vomiting. Neurological:  Negative for weakness and headaches. Objective  Visit Vitals  /62 (BP 1 Location: Left upper arm, BP Patient Position: Sitting, BP Cuff Size: Large adult)   Pulse 78   Temp 97.5 °F (36.4 °C) (Temporal)   Resp 18   Ht 5' 4\" (1.626 m)   Wt 229 lb (103.9 kg)   SpO2 96%   BMI 39.31 kg/m²     Physical Exam  Constitutional:       General: She is not in acute distress. Appearance: Normal appearance. HENT:      Head: Normocephalic and atraumatic. Eyes:      Extraocular Movements: Extraocular movements intact.       Conjunctiva/sclera: Conjunctivae normal. Cardiovascular:      Rate and Rhythm: Normal rate and regular rhythm. Heart sounds: Normal heart sounds. Pulmonary:      Effort: Pulmonary effort is normal. No respiratory distress. Breath sounds: Normal breath sounds. Musculoskeletal:         General: No swelling. Normal range of motion. Cervical back: Normal range of motion and neck supple. Skin:     General: Skin is warm and dry. Neurological:      General: No focal deficit present. Mental Status: She is alert and oriented to person, place, and time. Motor: No weakness. Gait: Gait normal.   Psychiatric:         Mood and Affect: Mood normal.         Behavior: Behavior normal.         Thought Content: Thought content normal.         Judgment: Judgment normal.        Assessment & Plan    ICD-10-CM ICD-9-CM    1. Type 2 diabetes mellitus with chronic kidney disease, without long-term current use of insulin, unspecified CKD stage ([facility])  E11.22 250.40 AMB POC HEMOGLOBIN A1C     585.9       2. History of breast cancer  Z85.3 V10.3       3. Asymptomatic menopausal state  Z78.0 V49.81 DEXA BONE DENSITY STUDY AXIAL      4. Moderate episode of recurrent major depressive disorder ([facility])  F33.1 296.32 venlafaxine-SR (EFFEXOR-XR) 150 mg capsule      5. Primary hypertension  I10 401.9 amLODIPine (NORVASC) 10 mg tablet      6. Gastroesophageal reflux disease without esophagitis  K21.9 530.81       7. Hypothyroidism, unspecified type  E03.9 244.9       8. Chronic obstructive pulmonary disease, unspecified COPD type ([facility]  J44.9 496       9. Sleep apnea, unspecified type  G47.30 780.57       10. Hyperlipidemia, unspecified hyperlipidemia type  E78.5 272.4       11. Encounter to establish care with new doctor  Z76.89 V65.8         Diagnoses and all orders for this visit:    1.  Type 2 diabetes mellitus with chronic kidney disease, without long-term current use of insulin, unspecified CKD stage ([facility])  -     AMB POC HEMOGLOBIN A1C  A1c 7.7 today from 7.8 in 06/2022. Continue Januvia 100mg daily and glipizide 5mg daily. Add Jardiance 10mg daily. Patient instructed to call if there are problems filling her prescriptions. Perform foot exam at follow up. Will need to inquire about annual eye exam.   2. History of breast cancer  Followed by Dr. Dolores Calle. Continue anastrozole as prescribed. Encouraged recommended mammogram follow up. 3. Asymptomatic menopausal state  -     DEXA BONE DENSITY STUDY AXIAL; Future  4. Moderate episode of recurrent major depressive disorder (HCC)  -     venlafaxine-SR (EFFEXOR-XR) 150 mg capsule; Take 1 Capsule by mouth daily. Refill venlafaxine. 5. Primary hypertension  -     amLODIPine (NORVASC) 10 mg tablet; Take 1 Tablet by mouth daily. Well controlled. Labs reviewed. Continue amlodipine 10mg daily, Lasix 20mg daily. 6. Gastroesophageal reflux disease without esophagitis  Continue pantoprazole 40mg daily PRN. 7. Hypothyroidism, unspecified type  Labs reviewed. Continue levothyroxine 100mcg daily. 8. Chronic obstructive pulmonary disease, unspecified COPD type ([facility]  Managed by Dr. Twyla Gar. Continue Trelegy as prescribed. 9. Sleep apnea, unspecified type  Will need to inquire whether patient is compliant with CPAP at follow up. 10. Hyperlipidemia, unspecified hyperlipidemia type  Labs reviewed. Continue rosuvastatin 40mg daily. 11. Encounter to establish care with new doctor  Chart reviewed. History collected from patient. Care gaps discussed. Counseled on trazodone taper: decrease to trazodone 50mg daily for one week followed by 50mg every other day for one week. Decrease Lasix to 20mg daily. Other orders  -     empagliflozin (Jardiance) 10 mg tablet; Take 1 Tablet by mouth daily. -     furosemide (LASIX) 20 mg tablet; Take 1 Tablet by mouth daily. Aspects of this note have been generated using voice recognition software. Despite editing, there may be some syntax errors.   Follow-up and Dispositions Return in about 3 months (around 2/22/2023) for Chronic problems.        Danilo Becerril, DO

## 2022-11-28 NOTE — PROGRESS NOTES
A1c was 7.7 which is stable as compared to the last A1c. Please continue Jardiance and glipizide as prescribed.

## 2023-02-01 DIAGNOSIS — E11.22 TYPE 2 DIABETES MELLITUS WITH STAGE 3A CHRONIC KIDNEY DISEASE, WITHOUT LONG-TERM CURRENT USE OF INSULIN (HCC): ICD-10-CM

## 2023-02-01 DIAGNOSIS — I10 ESSENTIAL HYPERTENSION: ICD-10-CM

## 2023-02-01 DIAGNOSIS — N18.31 TYPE 2 DIABETES MELLITUS WITH STAGE 3A CHRONIC KIDNEY DISEASE, WITHOUT LONG-TERM CURRENT USE OF INSULIN (HCC): ICD-10-CM

## 2023-02-01 NOTE — TELEPHONE ENCOUNTER
Requested Prescriptions     Pending Prescriptions Disp Refills    glipiZIDE (GLUCOTROL) 5 mg tablet 90 Tablet 1     Sig: Take 1 tab by mouth 30 min prior to breakfast daily for diabetes. levothyroxine (SYNTHROID) 100 mcg tablet 90 Tablet 1     Sig: Take 1 Tablet by mouth Daily (before breakfast).  Indications: a condition with low thyroid hormone levels            Last OV:11/22/22  Next OV:2/23/23  Last Refill:    Glipizide 8/18/22 Bakari Nicholas)  Qty 90  Refills   Labs 8/15/22    Levothyroxine 6/15/22 Bakari Nicholas )  Qty 90  Refills 1

## 2023-02-03 RX ORDER — LEVOTHYROXINE SODIUM 100 UG/1
100 TABLET ORAL
Qty: 90 TABLET | Refills: 1 | Status: SHIPPED | OUTPATIENT
Start: 2023-02-03

## 2023-02-03 RX ORDER — GLIPIZIDE 5 MG/1
TABLET ORAL
Qty: 90 TABLET | Refills: 1 | Status: SHIPPED | OUTPATIENT
Start: 2023-02-03

## 2023-02-23 ENCOUNTER — OFFICE VISIT (OUTPATIENT)
Dept: FAMILY MEDICINE CLINIC | Age: 73
End: 2023-02-23
Payer: MEDICARE

## 2023-02-23 VITALS
OXYGEN SATURATION: 98 % | SYSTOLIC BLOOD PRESSURE: 127 MMHG | TEMPERATURE: 97.7 F | DIASTOLIC BLOOD PRESSURE: 62 MMHG | WEIGHT: 232 LBS | HEIGHT: 64 IN | HEART RATE: 88 BPM | RESPIRATION RATE: 18 BRPM | BODY MASS INDEX: 39.61 KG/M2

## 2023-02-23 DIAGNOSIS — E11.21 TYPE 2 DIABETES WITH NEPHROPATHY (HCC): ICD-10-CM

## 2023-02-23 DIAGNOSIS — C90.00 MULTIPLE MYELOMA NOT HAVING ACHIEVED REMISSION (HCC): ICD-10-CM

## 2023-02-23 DIAGNOSIS — N28.89 LEFT RENAL MASS: ICD-10-CM

## 2023-02-23 DIAGNOSIS — E11.22 TYPE 2 DIABETES MELLITUS WITH CHRONIC KIDNEY DISEASE, WITHOUT LONG-TERM CURRENT USE OF INSULIN, UNSPECIFIED CKD STAGE (HCC): Primary | ICD-10-CM

## 2023-02-23 LAB — HBA1C MFR BLD HPLC: 8.2 %

## 2023-02-23 PROCEDURE — 1101F PT FALLS ASSESS-DOCD LE1/YR: CPT | Performed by: FAMILY MEDICINE

## 2023-02-23 PROCEDURE — 3052F HG A1C>EQUAL 8.0%<EQUAL 9.0%: CPT | Performed by: FAMILY MEDICINE

## 2023-02-23 PROCEDURE — G8417 CALC BMI ABV UP PARAM F/U: HCPCS | Performed by: FAMILY MEDICINE

## 2023-02-23 PROCEDURE — G9717 DOC PT DX DEP/BP F/U NT REQ: HCPCS | Performed by: FAMILY MEDICINE

## 2023-02-23 PROCEDURE — G9899 SCRN MAM PERF RSLTS DOC: HCPCS | Performed by: FAMILY MEDICINE

## 2023-02-23 PROCEDURE — G8536 NO DOC ELDER MAL SCRN: HCPCS | Performed by: FAMILY MEDICINE

## 2023-02-23 PROCEDURE — G8427 DOCREV CUR MEDS BY ELIG CLIN: HCPCS | Performed by: FAMILY MEDICINE

## 2023-02-23 PROCEDURE — 83036 HEMOGLOBIN GLYCOSYLATED A1C: CPT | Performed by: FAMILY MEDICINE

## 2023-02-23 PROCEDURE — G8399 PT W/DXA RESULTS DOCUMENT: HCPCS | Performed by: FAMILY MEDICINE

## 2023-02-23 PROCEDURE — 3074F SYST BP LT 130 MM HG: CPT | Performed by: FAMILY MEDICINE

## 2023-02-23 PROCEDURE — 3078F DIAST BP <80 MM HG: CPT | Performed by: FAMILY MEDICINE

## 2023-02-23 PROCEDURE — 3017F COLORECTAL CA SCREEN DOC REV: CPT | Performed by: FAMILY MEDICINE

## 2023-02-23 PROCEDURE — 99214 OFFICE O/P EST MOD 30 MIN: CPT | Performed by: FAMILY MEDICINE

## 2023-02-23 PROCEDURE — 2022F DILAT RTA XM EVC RTNOPTHY: CPT | Performed by: FAMILY MEDICINE

## 2023-02-23 PROCEDURE — 1090F PRES/ABSN URINE INCON ASSESS: CPT | Performed by: FAMILY MEDICINE

## 2023-02-23 PROCEDURE — 1123F ACP DISCUSS/DSCN MKR DOCD: CPT | Performed by: FAMILY MEDICINE

## 2023-02-23 RX ORDER — DEXAMETHASONE 1.5 MG/1
TABLET ORAL
COMMUNITY

## 2023-02-23 RX ORDER — HYDROXYZINE PAMOATE 50 MG/1
CAPSULE ORAL
COMMUNITY
Start: 2022-12-02

## 2023-02-23 RX ORDER — LORAZEPAM 0.5 MG/1
TABLET ORAL
COMMUNITY
Start: 2022-12-27 | End: 2023-02-23

## 2023-02-23 RX ORDER — LENALIDOMIDE 10 MG/1
CAPSULE ORAL
COMMUNITY

## 2023-02-23 NOTE — PROGRESS NOTES
Subjective  Chief Complaint   Patient presents with    Diabetes    Follow-up     HPI:  May Baker is a 74 y.o. female with medical problems as listed below who presents for DM follow up. Multiple myeloma: Since patient was last seen in office, she has been diagnosed with multiple myeloma after visiting the Emergency Room for shoulder pain. She was already established with Dr. Syed Chan due to history of breast cancer. She is started on treatments at this point. DM2:  Since last visit: Diagnosed with multiple myeloma recently. She is taking dexamethasone on a weekly basis. This has caused her blood sugar to increase all the way up to 500's. Lately she is running in the 200's. She mostly checks her glucose in the morning. She thought that she was supposed to stop Januvia when Abi Ready was initiated at her last visit. A1c: 7.8 (06/2022)-->7.7 (11/2022). Due today. Lipids: Total 189, HDL 46, LDL 98 (06/2022)  BMP: Cr 1.21 (08/2022)  Urine microalbumin: Wnl (06/2022)  Eye exam: Has seen [facility]/Dr. Gorman Phoenix in the last year. Foot exam: Overdue  ASA/statin: Rosuvastatin, ASA  Medications: Januvia 100mg daily, glipizide 5mg daily, Jardiance 10mg daily    Patient Active Problem List   Diagnosis Code    Nontraumatic complete tear of left rotator cuff M75.122    GERD (gastroesophageal reflux disease) K21.9    Arthritis M19.90    Depression F32. A    Essential hypertension I10    Hypothyroidism E03.9    Severe obesity (HCC) E66.01    Chronic obstructive pulmonary disease (HCC) J44.9    Type 2 diabetes mellitus with chronic kidney disease (HCC) E11.22    Sleep apnea G47.30    Hyperlipidemia E78.5    History of breast cancer Z85.3    Multiple myeloma (HCC) C90.00    Left renal mass N28.89     Family History   Problem Relation Age of Onset    Thyroid Disease Mother     Heart Disease Mother     Hypertension Mother     Heart Attack Mother     Cancer Father         SPINE     Other Father         COMMITTED SUICIDE     Diabetes Sister     Ovarian Cancer Sister     Diabetes Brother     Heart Disease Brother     Diabetes Brother     Heart Disease Brother     Lung Disease Brother     Diabetes Brother     Lung Cancer Brother     Anesth Problems Neg Hx      Social History     Tobacco Use    Smoking status: Every Day     Packs/day: 1.00     Years: 50.00     Pack years: 50.00     Types: Cigarettes    Smokeless tobacco: Never   Vaping Use    Vaping Use: Never used   Substance Use Topics    Alcohol use: Not Currently    Drug use: Never     Current Outpatient Medications on File Prior to Visit   Medication Sig Dispense Refill    hydrOXYzine pamoate (VISTARIL) 50 mg capsule TAKE 1 TO 2 CAPSULES BY MOUTH EVERY NIGHT 30 MINUTES BEFORE BEDTIME FOR INSOMNIA      bortezomib (VELCADE INJECTION) by Injection route. Dexamethasone 1.5 mg (21 tabs) DsPk Take  by mouth.      lenalidomide (Revlimid) 10 mg cap Take  by mouth. glipiZIDE (GLUCOTROL) 5 mg tablet Take 1 tab by mouth 30 min prior to breakfast daily for diabetes. 90 Tablet 1    levothyroxine (SYNTHROID) 100 mcg tablet Take 1 Tablet by mouth Daily (before breakfast). Indications: a condition with low thyroid hormone levels 90 Tablet 1    empagliflozin (Jardiance) 10 mg tablet Take 1 Tablet by mouth daily. 90 Tablet 3    venlafaxine-SR (EFFEXOR-XR) 150 mg capsule Take 1 Capsule by mouth daily. 90 Capsule 1    amLODIPine (NORVASC) 10 mg tablet Take 1 Tablet by mouth daily. 90 Tablet 1    furosemide (LASIX) 20 mg tablet Take 1 Tablet by mouth daily. 90 Tablet 1    pantoprazole (PROTONIX) 40 mg tablet Take 1 Tablet by mouth daily as needed (GERD). Indications: gastroesophageal reflux disease 90 Tablet 1    Trelegy Ellipta 100-62.5-25 mcg inhaler INHALE 1 PUFF BY MOUTH EVERY DAY      anastrozole (ARIMIDEX) 1 mg tablet Take 1 mg by mouth daily. aspirin delayed-release 81 mg tablet Take 81 mg by mouth daily.       omega-3 fatty acids/dha/epa (MEGARED PLANT-OMEGA-3 PO) Take 800 mg by mouth daily. vitamin E (AQUA GEMS) 400 unit capsule Take 400 Units by mouth daily. cholecalciferol (VITAMIN D3) (5000 Units/125 mcg) tab tablet Take 5,000 Units by mouth daily. No current facility-administered medications on file prior to visit. Allergies   Allergen Reactions    Erythromycin Base Unknown (comments)    Other Medication Other (comments)     TUBERCULOSIS  PT GETS CELLULITIS            Parafon Forte Dsc [Chlorzoxazone] Unknown (comments)     Review of Systems   Constitutional: Negative. Respiratory: Negative. Cardiovascular: Negative. Musculoskeletal:  Positive for joint pain. Objective  Visit Vitals  /62 (BP 1 Location: Left upper arm, BP Patient Position: Sitting, BP Cuff Size: Large adult)   Pulse 88   Temp 97.7 °F (36.5 °C) (Temporal)   Resp 18   Ht 5' 4\" (1.626 m)   Wt 232 lb (105.2 kg)   SpO2 98%   BMI 39.82 kg/m²     Physical Exam  Constitutional:       General: She is not in acute distress. Appearance: Normal appearance. HENT:      Head: Normocephalic and atraumatic. Eyes:      Extraocular Movements: Extraocular movements intact. Conjunctiva/sclera: Conjunctivae normal.   Pulmonary:      Effort: Pulmonary effort is normal. No respiratory distress. Musculoskeletal:      Cervical back: Normal range of motion. Skin:     General: Skin is warm and dry. Neurological:      General: No focal deficit present. Mental Status: She is alert and oriented to person, place, and time. Psychiatric:         Mood and Affect: Mood normal.         Behavior: Behavior normal.         Thought Content: Thought content normal.         Judgment: Judgment normal.        Assessment & Plan    ICD-10-CM ICD-9-CM    1. Type 2 diabetes mellitus with chronic kidney disease, without long-term current use of insulin, unspecified CKD stage ([facility])  E11.22 250.40 AMB POC HEMOGLOBIN A1C     585.9       2.  Type 2 diabetes with nephropathy ([facility])  E11.21 250.40 SITagliptin phosphate (JANUVIA) 100 mg tablet     583.81       3. Multiple myeloma not having achieved remission (HCC)  C90.00 203.00       4. Left renal mass  N28.89 593.9         Diagnoses and all orders for this visit:    1. Type 2 diabetes mellitus with chronic kidney disease, without long-term current use of insulin, unspecified CKD stage (HCC)  -     AMB POC HEMOGLOBIN A1C  -     SITagliptin phosphate (JANUVIA) 100 mg tablet; Take 1 Tablet by mouth daily. A1c 8.2 today from 7.7 in 11/2022, likely due to elevated glucoses from steroid medication. As patient had stopped taking Januvia when Samella Come was initiated, will restart that. Continue Jardiance 25mg daily, Januvia 100mg daily, and glipizide 5mg daily. Continue to monitor glucose. If persistently >200, RTC for further management. Otherwise, RTC x3 months. 3. Multiple myeloma not having achieved remission ([facility]    Recently diagnosed after multiple lytic lesions and pathologic clavicle fracture observed on CT scan in ED. Managed by Dr. Promise Lyon. Patient has very positive outlook. 4. Left renal mass  Incidental finding on CT scan from ED, recommending renal US. Encouraged patient to discuss renal US with Dr. Promise Lyon.      Alejandro Capps DO

## 2023-02-23 NOTE — PROGRESS NOTES
1. Have you been to the ER, urgent care clinic since your last visit? Hospitalized since your last visit? Yes, [facility] Dec 8th 2022    2. Have you seen or consulted any other health care providers outside of the [address] [place] [address] since your last visit? Include any pap smears or colon screening. Yes. Nevin Trujillo [place].       Chief Complaint   Patient presents with    Diabetes    Follow-up     Visit Vitals  /62 (BP 1 Location: Left upper arm, BP Patient Position: Sitting, BP Cuff Size: Large adult)   Pulse 88   Temp 97.7 °F (36.5 °C) (Temporal)   Resp 18   Ht 5' 4\" (1.626 m)   Wt 232 lb (105.2 kg)   SpO2 98%   BMI 39.82 kg/m²

## 2023-03-20 DIAGNOSIS — E78.2 MIXED HYPERLIPIDEMIA: ICD-10-CM

## 2023-03-21 NOTE — TELEPHONE ENCOUNTER
Last OV:02/23/2023  Next OV:none  Last Refill:08/02/2022  Last (labs):08/15/2022 and 6/1/2022    -*Insert any notes here*    Requested Prescriptions     Pending Prescriptions Disp Refills    rosuvastatin (CRESTOR) 40 mg tablet 90 Tablet 1     Sig: Take 1 Tablet by mouth nightly.  Indications: high cholesterol and high triglycerides

## 2023-03-22 RX ORDER — ROSUVASTATIN CALCIUM 40 MG/1
40 TABLET, COATED ORAL
Qty: 90 TABLET | Refills: 1 | Status: SHIPPED | OUTPATIENT
Start: 2023-03-22

## 2023-03-29 ENCOUNTER — HOSPITAL ENCOUNTER (INPATIENT)
Age: 73
LOS: 5 days | Discharge: HOME | DRG: 190 | End: 2023-04-03
Attending: EMERGENCY MEDICINE | Admitting: HOSPITALIST
Payer: MEDICARE

## 2023-03-29 ENCOUNTER — APPOINTMENT (OUTPATIENT)
Dept: GENERAL RADIOLOGY | Age: 73
DRG: 190 | End: 2023-03-29
Attending: EMERGENCY MEDICINE
Payer: MEDICARE

## 2023-03-29 DIAGNOSIS — R06.02 SOB (SHORTNESS OF BREATH): Primary | ICD-10-CM

## 2023-03-29 DIAGNOSIS — J81.0 ACUTE PULMONARY EDEMA (HCC): ICD-10-CM

## 2023-03-29 PROBLEM — J44.1 COPD WITH ACUTE EXACERBATION (HCC): Status: ACTIVE | Noted: 2023-03-29

## 2023-03-29 LAB
ALBUMIN SERPL-MCNC: 2.9 G/DL (ref 3.5–5)
ALBUMIN/GLOB SERPL: 0.7 (ref 1.1–2.2)
ALP SERPL-CCNC: 118 U/L (ref 45–117)
ALT SERPL-CCNC: 15 U/L (ref 12–78)
ANION GAP SERPL CALC-SCNC: 7 MMOL/L (ref 5–15)
AST SERPL W P-5'-P-CCNC: 12 U/L (ref 15–37)
BASOPHILS # BLD: 0.1 K/UL (ref 0–0.1)
BASOPHILS NFR BLD: 1 % (ref 0–1)
BILIRUB SERPL-MCNC: 0.3 MG/DL (ref 0.2–1)
BNP SERPL-MCNC: 2359 PG/ML
BUN SERPL-MCNC: 18 MG/DL (ref 6–20)
BUN/CREAT SERPL: 11 (ref 12–20)
CA-I BLD-MCNC: 8.2 MG/DL (ref 8.5–10.1)
CHLORIDE SERPL-SCNC: 110 MMOL/L (ref 97–108)
CO2 SERPL-SCNC: 22 MMOL/L (ref 21–32)
CREAT SERPL-MCNC: 1.61 MG/DL (ref 0.55–1.02)
DIFFERENTIAL METHOD BLD: ABNORMAL
EOSINOPHIL # BLD: 0.3 K/UL (ref 0–0.4)
EOSINOPHIL NFR BLD: 2 % (ref 0–7)
ERYTHROCYTE [DISTWIDTH] IN BLOOD BY AUTOMATED COUNT: 15.7 % (ref 11.5–14.5)
GLOBULIN SER CALC-MCNC: 4 G/DL (ref 2–4)
GLUCOSE BLD STRIP.AUTO-MCNC: 107 MG/DL (ref 65–100)
GLUCOSE SERPL-MCNC: 96 MG/DL (ref 65–100)
HCT VFR BLD AUTO: 35.2 % (ref 35–47)
HGB BLD-MCNC: 11.4 G/DL (ref 11.5–16)
IMM GRANULOCYTES # BLD AUTO: 0 K/UL (ref 0–0.04)
IMM GRANULOCYTES NFR BLD AUTO: 0 % (ref 0–0.5)
LACTATE SERPL-SCNC: 1.4 MMOL/L (ref 0.4–2)
LYMPHOCYTES # BLD: 1.4 K/UL (ref 0.8–3.5)
LYMPHOCYTES NFR BLD: 13 % (ref 12–49)
MCH RBC QN AUTO: 28.1 PG (ref 26–34)
MCHC RBC AUTO-ENTMCNC: 32.4 G/DL (ref 30–36.5)
MCV RBC AUTO: 86.7 FL (ref 80–99)
MONOCYTES # BLD: 1 K/UL (ref 0–1)
MONOCYTES NFR BLD: 9 % (ref 5–13)
NEUTS SEG # BLD: 8.1 K/UL (ref 1.8–8)
NEUTS SEG NFR BLD: 75 % (ref 32–75)
NRBC # BLD: 0 K/UL (ref 0–0.01)
NRBC BLD-RTO: 0 PER 100 WBC
PERFORMED BY, TECHID: ABNORMAL
PLATELET # BLD AUTO: 570 K/UL (ref 150–400)
PMV BLD AUTO: 10.7 FL (ref 8.9–12.9)
POTASSIUM SERPL-SCNC: 4 MMOL/L (ref 3.5–5.1)
PROT SERPL-MCNC: 6.9 G/DL (ref 6.4–8.2)
RBC # BLD AUTO: 4.06 M/UL (ref 3.8–5.2)
SODIUM SERPL-SCNC: 139 MMOL/L (ref 136–145)
TROPONIN I SERPL HS-MCNC: 8 NG/L (ref 0–51)
WBC # BLD AUTO: 10.9 K/UL (ref 3.6–11)

## 2023-03-29 PROCEDURE — 83880 ASSAY OF NATRIURETIC PEPTIDE: CPT

## 2023-03-29 PROCEDURE — 87150 DNA/RNA AMPLIFIED PROBE: CPT

## 2023-03-29 PROCEDURE — 65270000029 HC RM PRIVATE

## 2023-03-29 PROCEDURE — 74011250636 HC RX REV CODE- 250/636: Performed by: HOSPITALIST

## 2023-03-29 PROCEDURE — 36415 COLL VENOUS BLD VENIPUNCTURE: CPT

## 2023-03-29 PROCEDURE — 85025 COMPLETE CBC W/AUTO DIFF WBC: CPT

## 2023-03-29 PROCEDURE — 96375 TX/PRO/DX INJ NEW DRUG ADDON: CPT

## 2023-03-29 PROCEDURE — 94640 AIRWAY INHALATION TREATMENT: CPT

## 2023-03-29 PROCEDURE — 83605 ASSAY OF LACTIC ACID: CPT

## 2023-03-29 PROCEDURE — 87186 SC STD MICRODIL/AGAR DIL: CPT

## 2023-03-29 PROCEDURE — 71045 X-RAY EXAM CHEST 1 VIEW: CPT

## 2023-03-29 PROCEDURE — 84484 ASSAY OF TROPONIN QUANT: CPT

## 2023-03-29 PROCEDURE — 99285 EMERGENCY DEPT VISIT HI MDM: CPT

## 2023-03-29 PROCEDURE — 93005 ELECTROCARDIOGRAM TRACING: CPT

## 2023-03-29 PROCEDURE — 74011250637 HC RX REV CODE- 250/637: Performed by: HOSPITALIST

## 2023-03-29 PROCEDURE — 82962 GLUCOSE BLOOD TEST: CPT

## 2023-03-29 PROCEDURE — 80053 COMPREHEN METABOLIC PANEL: CPT

## 2023-03-29 PROCEDURE — 74011000250 HC RX REV CODE- 250: Performed by: HOSPITALIST

## 2023-03-29 PROCEDURE — 74011000250 HC RX REV CODE- 250: Performed by: EMERGENCY MEDICINE

## 2023-03-29 PROCEDURE — 74011250636 HC RX REV CODE- 250/636: Performed by: EMERGENCY MEDICINE

## 2023-03-29 PROCEDURE — 87077 CULTURE AEROBIC IDENTIFY: CPT

## 2023-03-29 PROCEDURE — 87040 BLOOD CULTURE FOR BACTERIA: CPT

## 2023-03-29 PROCEDURE — 96374 THER/PROPH/DIAG INJ IV PUSH: CPT

## 2023-03-29 RX ORDER — ENOXAPARIN SODIUM 100 MG/ML
40 INJECTION SUBCUTANEOUS EVERY 24 HOURS
Status: DISCONTINUED | OUTPATIENT
Start: 2023-03-29 | End: 2023-03-30

## 2023-03-29 RX ORDER — ASPIRIN 81 MG/1
81 TABLET ORAL DAILY
Status: DISCONTINUED | OUTPATIENT
Start: 2023-03-30 | End: 2023-04-03 | Stop reason: HOSPADM

## 2023-03-29 RX ORDER — ONDANSETRON 4 MG/1
4 TABLET, ORALLY DISINTEGRATING ORAL
Status: DISCONTINUED | OUTPATIENT
Start: 2023-03-29 | End: 2023-04-03 | Stop reason: HOSPADM

## 2023-03-29 RX ORDER — ALOGLIPTIN 12.5 MG/1
12.5 TABLET, FILM COATED ORAL DAILY
Status: DISCONTINUED | OUTPATIENT
Start: 2023-03-30 | End: 2023-04-03 | Stop reason: HOSPADM

## 2023-03-29 RX ORDER — IPRATROPIUM BROMIDE AND ALBUTEROL SULFATE 2.5; .5 MG/3ML; MG/3ML
6 SOLUTION RESPIRATORY (INHALATION)
Status: COMPLETED | OUTPATIENT
Start: 2023-03-29 | End: 2023-03-29

## 2023-03-29 RX ORDER — LEVOTHYROXINE SODIUM 100 UG/1
100 TABLET ORAL
Status: DISCONTINUED | OUTPATIENT
Start: 2023-03-30 | End: 2023-04-03 | Stop reason: HOSPADM

## 2023-03-29 RX ORDER — IPRATROPIUM BROMIDE AND ALBUTEROL SULFATE 2.5; .5 MG/3ML; MG/3ML
3 SOLUTION RESPIRATORY (INHALATION)
Status: DISCONTINUED | OUTPATIENT
Start: 2023-03-30 | End: 2023-03-30

## 2023-03-29 RX ORDER — IBUPROFEN 200 MG
4 TABLET ORAL AS NEEDED
Status: DISCONTINUED | OUTPATIENT
Start: 2023-03-29 | End: 2023-04-03 | Stop reason: HOSPADM

## 2023-03-29 RX ORDER — HYDROCODONE BITARTRATE AND ACETAMINOPHEN 5; 325 MG/1; MG/1
1 TABLET ORAL
Start: 2023-03-06

## 2023-03-29 RX ORDER — PANTOPRAZOLE SODIUM 40 MG/1
40 TABLET, DELAYED RELEASE ORAL
Status: DISCONTINUED | OUTPATIENT
Start: 2023-03-30 | End: 2023-04-03 | Stop reason: HOSPADM

## 2023-03-29 RX ORDER — ANASTROZOLE 1 MG/1
1 TABLET ORAL DAILY
Status: DISCONTINUED | OUTPATIENT
Start: 2023-03-30 | End: 2023-04-03 | Stop reason: HOSPADM

## 2023-03-29 RX ORDER — ACYCLOVIR 400 MG/1
400 TABLET ORAL 2 TIMES DAILY
Start: 2023-03-29

## 2023-03-29 RX ORDER — HYDROXYZINE PAMOATE 25 MG/1
25 CAPSULE ORAL
Status: DISCONTINUED | OUTPATIENT
Start: 2023-03-29 | End: 2023-04-03 | Stop reason: HOSPADM

## 2023-03-29 RX ORDER — ALBUTEROL SULFATE 2.5 MG/.5ML
2.5 SOLUTION RESPIRATORY (INHALATION)
Status: DISCONTINUED | OUTPATIENT
Start: 2023-03-29 | End: 2023-04-03 | Stop reason: HOSPADM

## 2023-03-29 RX ORDER — DEXTROSE MONOHYDRATE 100 MG/ML
0-250 INJECTION, SOLUTION INTRAVENOUS AS NEEDED
Status: DISCONTINUED | OUTPATIENT
Start: 2023-03-29 | End: 2023-04-03 | Stop reason: HOSPADM

## 2023-03-29 RX ORDER — HYDROCODONE BITARTRATE AND ACETAMINOPHEN 5; 325 MG/1; MG/1
1 TABLET ORAL
Status: DISCONTINUED | OUTPATIENT
Start: 2023-03-29 | End: 2023-04-03 | Stop reason: HOSPADM

## 2023-03-29 RX ORDER — INSULIN LISPRO 100 [IU]/ML
INJECTION, SOLUTION INTRAVENOUS; SUBCUTANEOUS
Status: DISCONTINUED | OUTPATIENT
Start: 2023-03-29 | End: 2023-04-03 | Stop reason: HOSPADM

## 2023-03-29 RX ORDER — GLIPIZIDE 5 MG/1
5 TABLET ORAL
Status: DISCONTINUED | OUTPATIENT
Start: 2023-03-30 | End: 2023-04-03 | Stop reason: HOSPADM

## 2023-03-29 RX ORDER — AMLODIPINE BESYLATE 5 MG/1
10 TABLET ORAL DAILY
Status: DISCONTINUED | OUTPATIENT
Start: 2023-03-30 | End: 2023-03-31

## 2023-03-29 RX ORDER — ACYCLOVIR 800 MG/1
400 TABLET ORAL 2 TIMES DAILY
Status: DISCONTINUED | OUTPATIENT
Start: 2023-03-29 | End: 2023-03-30

## 2023-03-29 RX ORDER — DOCUSATE SODIUM 100 MG/1
100 CAPSULE, LIQUID FILLED ORAL 2 TIMES DAILY
Status: DISCONTINUED | OUTPATIENT
Start: 2023-03-29 | End: 2023-04-03 | Stop reason: HOSPADM

## 2023-03-29 RX ORDER — FUROSEMIDE 10 MG/ML
40 INJECTION INTRAMUSCULAR; INTRAVENOUS
Status: COMPLETED | OUTPATIENT
Start: 2023-03-29 | End: 2023-03-29

## 2023-03-29 RX ORDER — BUDESONIDE AND FORMOTEROL FUMARATE DIHYDRATE 160; 4.5 UG/1; UG/1
2 AEROSOL RESPIRATORY (INHALATION)
Status: DISCONTINUED | OUTPATIENT
Start: 2023-03-29 | End: 2023-04-03 | Stop reason: HOSPADM

## 2023-03-29 RX ORDER — OMEPRAZOLE 20 MG/1
20 CAPSULE, DELAYED RELEASE ORAL 2 TIMES DAILY
Start: 2023-03-29

## 2023-03-29 RX ORDER — SODIUM CHLORIDE 0.9 % (FLUSH) 0.9 %
5-40 SYRINGE (ML) INJECTION AS NEEDED
Status: DISCONTINUED | OUTPATIENT
Start: 2023-03-29 | End: 2023-04-03 | Stop reason: HOSPADM

## 2023-03-29 RX ORDER — ACETAMINOPHEN 325 MG/1
650 TABLET ORAL
Status: DISCONTINUED | OUTPATIENT
Start: 2023-03-29 | End: 2023-04-03 | Stop reason: HOSPADM

## 2023-03-29 RX ORDER — CHOLECALCIFEROL TAB 125 MCG (5000 UNIT) 125 MCG
5000 TAB ORAL DAILY
Status: DISCONTINUED | OUTPATIENT
Start: 2023-03-30 | End: 2023-04-03 | Stop reason: HOSPADM

## 2023-03-29 RX ORDER — VENLAFAXINE HYDROCHLORIDE 75 MG/1
150 CAPSULE, EXTENDED RELEASE ORAL DAILY
Status: DISCONTINUED | OUTPATIENT
Start: 2023-03-30 | End: 2023-04-03 | Stop reason: HOSPADM

## 2023-03-29 RX ORDER — SODIUM CHLORIDE 0.9 % (FLUSH) 0.9 %
5-40 SYRINGE (ML) INJECTION EVERY 8 HOURS
Status: DISCONTINUED | OUTPATIENT
Start: 2023-03-29 | End: 2023-04-02

## 2023-03-29 RX ADMIN — FUROSEMIDE 40 MG: 10 INJECTION, SOLUTION INTRAMUSCULAR; INTRAVENOUS at 20:46

## 2023-03-29 RX ADMIN — ACYCLOVIR 400 MG: 800 TABLET ORAL at 23:02

## 2023-03-29 RX ADMIN — IPRATROPIUM BROMIDE AND ALBUTEROL SULFATE 6 ML: 2.5; .5 SOLUTION RESPIRATORY (INHALATION) at 21:13

## 2023-03-29 RX ADMIN — METHYLPREDNISOLONE SODIUM SUCCINATE 37.5 MG: 125 INJECTION, POWDER, FOR SOLUTION INTRAMUSCULAR; INTRAVENOUS at 22:38

## 2023-03-29 RX ADMIN — ENOXAPARIN SODIUM 40 MG: 100 INJECTION SUBCUTANEOUS at 22:39

## 2023-03-29 RX ADMIN — SODIUM CHLORIDE, PRESERVATIVE FREE 10 ML: 5 INJECTION INTRAVENOUS at 22:40

## 2023-03-29 RX ADMIN — HYDROXYZINE PAMOATE 25 MG: 25 CAPSULE ORAL at 23:56

## 2023-03-29 NOTE — Clinical Note
Status[de-identified] INPATIENT [101]   Type of Bed: Remote Telemetry [29]   Cardiac Monitoring Required?: Yes   Inpatient Hospitalization Certified Necessary for the Following Reasons: 3.  Patient receiving treatment that can only be provided in an inpatient setting (further clarification in H&P documentation)   Admitting Diagnosis: COPD with acute exacerbation [facility]) [[id number]]   Admitting Physician: Amina Conway [[id number]]   Attending Physician: Amina Conway [[id number]]   Estimated Length of Stay: 2 Midnights   Discharge Plan[de-identified] [facility]

## 2023-03-29 NOTE — ED TRIAGE NOTES
Pt getting treatment at [facility] for melanoma, oncologist advised to come to ED for possible blood clot. Pt having worsening edema in lower extremities, denies pain. Pt has audible wheezing and crackles. Pt SOB with exertion. Having difficulties talking in full sentences.  91% when talking, 80s with ambulation

## 2023-03-30 ENCOUNTER — APPOINTMENT (OUTPATIENT)
Dept: NON INVASIVE DIAGNOSTICS | Age: 73
DRG: 190 | End: 2023-03-30
Attending: NURSE PRACTITIONER
Payer: MEDICARE

## 2023-03-30 LAB
ALBUMIN SERPL-MCNC: 2.5 G/DL (ref 3.5–5)
ANION GAP SERPL CALC-SCNC: 10 MMOL/L (ref 5–15)
APPEARANCE UR: CLEAR
ATRIAL RATE: 138 BPM
ATRIAL RATE: 153 BPM
ATRIAL RATE: 87 BPM
BACTERIA URNS QL MICRO: ABNORMAL /HPF
BACTERIA URNS QL MICRO: ABNORMAL /HPF
BILIRUB UR QL: NEGATIVE
BNP SERPL-MCNC: 6724 PG/ML
BUN SERPL-MCNC: 17 MG/DL (ref 6–20)
BUN/CREAT SERPL: 11 (ref 12–20)
CA-I BLD-MCNC: 7.5 MG/DL (ref 8.5–10.1)
CALCULATED P AXIS, ECG09: 59 DEGREES
CALCULATED R AXIS, ECG10: -15 DEGREES
CALCULATED R AXIS, ECG10: 25 DEGREES
CALCULATED R AXIS, ECG10: 85 DEGREES
CALCULATED T AXIS, ECG11: -129 DEGREES
CALCULATED T AXIS, ECG11: -171 DEGREES
CALCULATED T AXIS, ECG11: 50 DEGREES
CHLORIDE SERPL-SCNC: 113 MMOL/L (ref 97–108)
CO2 SERPL-SCNC: 18 MMOL/L (ref 21–32)
COLOR UR: ABNORMAL
CREAT SERPL-MCNC: 1.5 MG/DL (ref 0.55–1.02)
DIAGNOSIS, 93000: NORMAL
ECHO AO ASC DIAM: 3.2 CM
ECHO AO ASCENDING AORTA INDEX: 1.55 CM/M2
ECHO AO ROOT DIAM: 2.8 CM
ECHO AO ROOT INDEX: 1.35 CM/M2
ECHO LA AREA 2C: 16 CM2
ECHO LA AREA 4C: 21.1 CM2
ECHO LA DIAMETER INDEX: 1.98 CM/M2
ECHO LA DIAMETER: 4.1 CM
ECHO LA MAJOR AXIS: 5.9 CM
ECHO LA MINOR AXIS: 4.4 CM
ECHO LA TO AORTIC ROOT RATIO: 1.46
ECHO LV EDV A2C: 52 ML
ECHO LV EDV A4C: 49 ML
ECHO LV EDV INDEX A4C: 24 ML/M2
ECHO LV EDV NDEX A2C: 25 ML/M2
ECHO LV EJECTION FRACTION A2C: 65 %
ECHO LV EJECTION FRACTION A4C: 65 %
ECHO LV EJECTION FRACTION BIPLANE: 67 % (ref 55–100)
ECHO LV ESV A2C: 18 ML
ECHO LV ESV A4C: 17 ML
ECHO LV ESV INDEX A2C: 9 ML/M2
ECHO LV ESV INDEX A4C: 8 ML/M2
ECHO LV FRACTIONAL SHORTENING: 27 % (ref 28–44)
ECHO LV INTERNAL DIMENSION DIASTOLE INDEX: 1.98 CM/M2
ECHO LV INTERNAL DIMENSION DIASTOLIC: 4.1 CM (ref 3.9–5.3)
ECHO LV INTERNAL DIMENSION SYSTOLIC INDEX: 1.45 CM/M2
ECHO LV INTERNAL DIMENSION SYSTOLIC: 3 CM
ECHO LV IVSD: 1.2 CM (ref 0.6–0.9)
ECHO LV MASS 2D: 171.7 G (ref 67–162)
ECHO LV MASS INDEX 2D: 83 G/M2 (ref 43–95)
ECHO LV POSTERIOR WALL DIASTOLIC: 1.2 CM (ref 0.6–0.9)
ECHO LV RELATIVE WALL THICKNESS RATIO: 0.59
ECHO PV MAX VELOCITY: 1 M/S
ECHO PV PEAK GRADIENT: 4 MMHG
ECHO TV REGURGITANT MAX VELOCITY: 1.7 M/S
ECHO TV REGURGITANT PEAK GRADIENT: 12 MMHG
EST. AVERAGE GLUCOSE BLD GHB EST-MCNC: 186 MG/DL
GLUCOSE BLD STRIP.AUTO-MCNC: 170 MG/DL (ref 65–100)
GLUCOSE BLD STRIP.AUTO-MCNC: 176 MG/DL (ref 65–100)
GLUCOSE BLD STRIP.AUTO-MCNC: 239 MG/DL (ref 65–100)
GLUCOSE BLD STRIP.AUTO-MCNC: 248 MG/DL (ref 65–100)
GLUCOSE SERPL-MCNC: 164 MG/DL (ref 65–100)
GLUCOSE UR STRIP.AUTO-MCNC: >300 MG/DL
HBA1C MFR BLD: 8.1 % (ref 4–5.6)
HGB UR QL STRIP: ABNORMAL
KETONES UR QL STRIP.AUTO: 5 MG/DL
LEUKOCYTE ESTERASE UR QL STRIP.AUTO: NEGATIVE
MRSA DNA SPEC QL NAA+PROBE: NOT DETECTED
NITRITE UR QL STRIP.AUTO: NEGATIVE
P-R INTERVAL, ECG05: 164 MS
PERFORMED BY, TECHID: ABNORMAL
PH UR STRIP: 5 (ref 5–8)
PHOSPHATE SERPL-MCNC: 3.2 MG/DL (ref 2.6–4.7)
POTASSIUM SERPL-SCNC: 4.2 MMOL/L (ref 3.5–5.1)
PROCALCITONIN SERPL-MCNC: 0.1 NG/ML
PROT UR STRIP-MCNC: NEGATIVE MG/DL
Q-T INTERVAL, ECG07: 306 MS
Q-T INTERVAL, ECG07: 326 MS
Q-T INTERVAL, ECG07: 368 MS
QRS DURATION, ECG06: 76 MS
QRS DURATION, ECG06: 84 MS
QRS DURATION, ECG06: 86 MS
QTC CALCULATION (BEZET), ECG08: 442 MS
QTC CALCULATION (BEZET), ECG08: 448 MS
QTC CALCULATION (BEZET), ECG08: 516 MS
RBC #/AREA URNS HPF: ABNORMAL /HPF (ref 0–5)
RBC #/AREA URNS HPF: ABNORMAL /HPF (ref 0–5)
SODIUM SERPL-SCNC: 141 MMOL/L (ref 136–145)
SP GR UR REFRACTOMETRY: 1 (ref 1–1.03)
TROPONIN I SERPL HS-MCNC: 6 NG/L (ref 0–51)
TROPONIN I SERPL HS-MCNC: 8 NG/L (ref 0–51)
UROBILINOGEN UR QL STRIP.AUTO: 0.1 EU/DL (ref 0.1–1)
VENTRICULAR RATE, ECG03: 129 BPM
VENTRICULAR RATE, ECG03: 151 BPM
VENTRICULAR RATE, ECG03: 87 BPM
WBC URNS QL MICRO: ABNORMAL /HPF (ref 0–4)
WBC URNS QL MICRO: ABNORMAL /HPF (ref 0–4)

## 2023-03-30 PROCEDURE — 83880 ASSAY OF NATRIURETIC PEPTIDE: CPT

## 2023-03-30 PROCEDURE — 93308 TTE F-UP OR LMTD: CPT

## 2023-03-30 PROCEDURE — 96376 TX/PRO/DX INJ SAME DRUG ADON: CPT

## 2023-03-30 PROCEDURE — 74011250637 HC RX REV CODE- 250/637: Performed by: HOSPITALIST

## 2023-03-30 PROCEDURE — 74011636637 HC RX REV CODE- 636/637: Performed by: HOSPITALIST

## 2023-03-30 PROCEDURE — 81001 URINALYSIS AUTO W/SCOPE: CPT

## 2023-03-30 PROCEDURE — 93005 ELECTROCARDIOGRAM TRACING: CPT

## 2023-03-30 PROCEDURE — 74011000250 HC RX REV CODE- 250: Performed by: HOSPITALIST

## 2023-03-30 PROCEDURE — 83036 HEMOGLOBIN GLYCOSYLATED A1C: CPT

## 2023-03-30 PROCEDURE — 80069 RENAL FUNCTION PANEL: CPT

## 2023-03-30 PROCEDURE — 96375 TX/PRO/DX INJ NEW DRUG ADDON: CPT

## 2023-03-30 PROCEDURE — 94640 AIRWAY INHALATION TREATMENT: CPT

## 2023-03-30 PROCEDURE — 84145 PROCALCITONIN (PCT): CPT

## 2023-03-30 PROCEDURE — 65610000006 HC RM INTENSIVE CARE

## 2023-03-30 PROCEDURE — 74011000258 HC RX REV CODE- 258: Performed by: HOSPITALIST

## 2023-03-30 PROCEDURE — 87641 MR-STAPH DNA AMP PROBE: CPT

## 2023-03-30 PROCEDURE — 82962 GLUCOSE BLOOD TEST: CPT

## 2023-03-30 PROCEDURE — 74011250636 HC RX REV CODE- 250/636: Performed by: NURSE PRACTITIONER

## 2023-03-30 PROCEDURE — 93970 EXTREMITY STUDY: CPT

## 2023-03-30 PROCEDURE — 74011250636 HC RX REV CODE- 250/636: Performed by: HOSPITALIST

## 2023-03-30 PROCEDURE — 84484 ASSAY OF TROPONIN QUANT: CPT

## 2023-03-30 RX ORDER — DEXAMETHASONE 4 MG/1: 40 TABLET ORAL

## 2023-03-30 RX ORDER — IPRATROPIUM BROMIDE AND ALBUTEROL SULFATE 2.5; .5 MG/3ML; MG/3ML
3 SOLUTION RESPIRATORY (INHALATION)
Status: DISCONTINUED | OUTPATIENT
Start: 2023-03-30 | End: 2023-04-03 | Stop reason: HOSPADM

## 2023-03-30 RX ORDER — METOPROLOL TARTRATE 25 MG/1
25 TABLET, FILM COATED ORAL ONCE
Status: COMPLETED | OUTPATIENT
Start: 2023-03-30 | End: 2023-03-30

## 2023-03-30 RX ORDER — DOXYCYCLINE 100 MG/1
100 CAPSULE ORAL EVERY 12 HOURS
Status: DISCONTINUED | OUTPATIENT
Start: 2023-03-30 | End: 2023-04-03 | Stop reason: HOSPADM

## 2023-03-30 RX ORDER — DILTIAZEM HCL/D5W 125 MG/125
0-15 PLASTIC BAG, INJECTION (ML) INTRAVENOUS
Status: DISCONTINUED | OUTPATIENT
Start: 2023-03-30 | End: 2023-03-30

## 2023-03-30 RX ORDER — PANTOPRAZOLE SODIUM 40 MG/1
40 TABLET, DELAYED RELEASE ORAL DAILY
COMMUNITY
End: 2023-03-30

## 2023-03-30 RX ORDER — FUROSEMIDE 10 MG/ML
40 INJECTION INTRAMUSCULAR; INTRAVENOUS 2 TIMES DAILY
Status: DISCONTINUED | OUTPATIENT
Start: 2023-03-30 | End: 2023-03-31

## 2023-03-30 RX ORDER — IPRATROPIUM BROMIDE AND ALBUTEROL SULFATE 2.5; .5 MG/3ML; MG/3ML
3 SOLUTION RESPIRATORY (INHALATION)
Status: DISCONTINUED | OUTPATIENT
Start: 2023-03-30 | End: 2023-03-30

## 2023-03-30 RX ORDER — ENOXAPARIN SODIUM 100 MG/ML
100 INJECTION SUBCUTANEOUS 2 TIMES DAILY
Status: DISCONTINUED | OUTPATIENT
Start: 2023-03-30 | End: 2023-03-31

## 2023-03-30 RX ORDER — METOPROLOL TARTRATE 5 MG/5ML
2.5 INJECTION INTRAVENOUS ONCE
Status: COMPLETED | OUTPATIENT
Start: 2023-03-30 | End: 2023-03-30

## 2023-03-30 RX ORDER — ACYCLOVIR 200 MG/1
400 CAPSULE ORAL 2 TIMES DAILY
Status: DISCONTINUED | OUTPATIENT
Start: 2023-03-30 | End: 2023-04-03 | Stop reason: HOSPADM

## 2023-03-30 RX ORDER — IPRATROPIUM BROMIDE AND ALBUTEROL SULFATE 2.5; .5 MG/3ML; MG/3ML
3 SOLUTION RESPIRATORY (INHALATION) 3 TIMES DAILY
Status: DISCONTINUED | OUTPATIENT
Start: 2023-03-30 | End: 2023-03-30

## 2023-03-30 RX ORDER — METOPROLOL TARTRATE 25 MG/1
25 TABLET, FILM COATED ORAL EVERY 12 HOURS
Status: DISCONTINUED | OUTPATIENT
Start: 2023-03-30 | End: 2023-04-03 | Stop reason: HOSPADM

## 2023-03-30 RX ADMIN — LEVOTHYROXINE SODIUM 100 MCG: 0.1 TABLET ORAL at 08:33

## 2023-03-30 RX ADMIN — BUDESONIDE AND FORMOTEROL FUMARATE DIHYDRATE 2 PUFF: 160; 4.5 AEROSOL RESPIRATORY (INHALATION) at 20:28

## 2023-03-30 RX ADMIN — FUROSEMIDE 40 MG: 10 INJECTION, SOLUTION INTRAMUSCULAR; INTRAVENOUS at 21:32

## 2023-03-30 RX ADMIN — SODIUM CHLORIDE, PRESERVATIVE FREE 20 ML: 5 INJECTION INTRAVENOUS at 14:00

## 2023-03-30 RX ADMIN — DOCUSATE SODIUM 100 MG: 100 CAPSULE, LIQUID FILLED ORAL at 08:33

## 2023-03-30 RX ADMIN — METOPROLOL TARTRATE 2.5 MG: 5 INJECTION, SOLUTION INTRAVENOUS at 06:23

## 2023-03-30 RX ADMIN — METHYLPREDNISOLONE SODIUM SUCCINATE 37.5 MG: 125 INJECTION, POWDER, FOR SOLUTION INTRAMUSCULAR; INTRAVENOUS at 06:20

## 2023-03-30 RX ADMIN — IPRATROPIUM BROMIDE AND ALBUTEROL SULFATE 3 ML: 2.5; .5 SOLUTION RESPIRATORY (INHALATION) at 08:01

## 2023-03-30 RX ADMIN — ALOGLIPTIN 12.5 MG: 12.5 TABLET, FILM COATED ORAL at 11:25

## 2023-03-30 RX ADMIN — DILTIAZEM HYDROCHLORIDE 12.5 MG/HR: 5 INJECTION INTRAVENOUS at 19:59

## 2023-03-30 RX ADMIN — HYDROCODONE BITARTRATE AND ACETAMINOPHEN 1 TABLET: 5; 325 TABLET ORAL at 14:30

## 2023-03-30 RX ADMIN — IPRATROPIUM BROMIDE AND ALBUTEROL SULFATE 3 ML: 2.5; .5 SOLUTION RESPIRATORY (INHALATION) at 20:23

## 2023-03-30 RX ADMIN — METOPROLOL TARTRATE 25 MG: 25 TABLET, FILM COATED ORAL at 07:38

## 2023-03-30 RX ADMIN — DOCUSATE SODIUM 100 MG: 100 CAPSULE, LIQUID FILLED ORAL at 21:32

## 2023-03-30 RX ADMIN — ENOXAPARIN SODIUM 100 MG: 100 INJECTION SUBCUTANEOUS at 21:32

## 2023-03-30 RX ADMIN — DOXYCYCLINE HYCLATE 100 MG: 100 CAPSULE ORAL at 16:50

## 2023-03-30 RX ADMIN — ASPIRIN 81 MG: 81 TABLET, COATED ORAL at 08:34

## 2023-03-30 RX ADMIN — FUROSEMIDE 40 MG: 10 INJECTION, SOLUTION INTRAMUSCULAR; INTRAVENOUS at 11:36

## 2023-03-30 RX ADMIN — PANTOPRAZOLE SODIUM 40 MG: 40 TABLET, DELAYED RELEASE ORAL at 08:34

## 2023-03-30 RX ADMIN — INSULIN LISPRO 2 UNITS: 100 INJECTION, SOLUTION INTRAVENOUS; SUBCUTANEOUS at 21:54

## 2023-03-30 RX ADMIN — SODIUM CHLORIDE, PRESERVATIVE FREE 10 ML: 5 INJECTION INTRAVENOUS at 21:33

## 2023-03-30 RX ADMIN — HYDROCODONE BITARTRATE AND ACETAMINOPHEN 1 TABLET: 5; 325 TABLET ORAL at 03:29

## 2023-03-30 RX ADMIN — VENLAFAXINE HYDROCHLORIDE 150 MG: 75 CAPSULE, EXTENDED RELEASE ORAL at 10:48

## 2023-03-30 RX ADMIN — INSULIN LISPRO 3 UNITS: 100 INJECTION, SOLUTION INTRAVENOUS; SUBCUTANEOUS at 08:34

## 2023-03-30 RX ADMIN — IPRATROPIUM BROMIDE AND ALBUTEROL SULFATE 3 ML: 2.5; .5 SOLUTION RESPIRATORY (INHALATION) at 15:34

## 2023-03-30 RX ADMIN — ACYCLOVIR 400 MG: 800 TABLET ORAL at 11:25

## 2023-03-30 RX ADMIN — METHYLPREDNISOLONE SODIUM SUCCINATE 37.5 MG: 125 INJECTION, POWDER, FOR SOLUTION INTRAMUSCULAR; INTRAVENOUS at 21:32

## 2023-03-30 RX ADMIN — HYDROCODONE BITARTRATE AND ACETAMINOPHEN 1 TABLET: 5; 325 TABLET ORAL at 21:44

## 2023-03-30 RX ADMIN — CHOLECALCIFEROL TAB 125 MCG (5000 UNIT) 5000 UNITS: 125 TAB at 08:34

## 2023-03-30 RX ADMIN — ACYCLOVIR 400 MG: 200 CAPSULE ORAL at 21:32

## 2023-03-30 RX ADMIN — METOPROLOL TARTRATE 25 MG: 25 TABLET, FILM COATED ORAL at 21:32

## 2023-03-30 RX ADMIN — DILTIAZEM HYDROCHLORIDE 2.5 MG/HR: 5 INJECTION INTRAVENOUS at 10:35

## 2023-03-30 RX ADMIN — METHYLPREDNISOLONE SODIUM SUCCINATE 37.5 MG: 125 INJECTION, POWDER, FOR SOLUTION INTRAMUSCULAR; INTRAVENOUS at 16:51

## 2023-03-30 RX ADMIN — ANASTROZOLE 1 MG: 1 TABLET ORAL at 11:25

## 2023-03-30 RX ADMIN — SODIUM CHLORIDE, PRESERVATIVE FREE 10 ML: 5 INJECTION INTRAVENOUS at 06:20

## 2023-03-30 RX ADMIN — INSULIN LISPRO 3 UNITS: 100 INJECTION, SOLUTION INTRAVENOUS; SUBCUTANEOUS at 11:30

## 2023-03-30 RX ADMIN — AMLODIPINE BESYLATE 10 MG: 5 TABLET ORAL at 08:33

## 2023-03-30 RX ADMIN — INSULIN LISPRO 4 UNITS: 100 INJECTION, SOLUTION INTRAVENOUS; SUBCUTANEOUS at 18:43

## 2023-03-30 NOTE — ED NOTES
When rounding on patient - patient was sitting up on side of bed, increased work of breathing and anxious, complaining of increased chest discomfort. Monitor appeared that patient was in A. Fib with RVR. Patient states no hx of A. Fib. Repeat EKG obtained and given to [facility].  Orders received to draw troponin level, administer 2.5 mg metoprolol IV, and for cardiology to see patient in the AM.

## 2023-03-30 NOTE — CONSULTS
IMPRESSION:   Acute hypoxic respiratory failure  A-fib with RVR  Acute exacerbation of COPD  Tobacco abuse  Multiple myeloma with lytic lesions to the bone  Pt is at high risk of sudden decline and decompensation with life threatening consequenses and continued end organ dysfunction and failure  Pt is critically ill.  Time spent with pt and staff actively rendering care, managing pt and coordinating care as stated below;  55 minutes, exclusive of any procedures      RECOMMENDATIONS/PLAN:   ICU monitoring  77-year-old lady came in with other shortness of breath dyspnea and she was found to be in A-fib with RVR started on IV Cardizem and also received beta-blocker  COPD start patient on IV Solu-Medrol nebulizer treatment  History of multiple myeloma the patient has bony lytic lesions received chemotherapy  Chest x-ray shows bilateral infiltrate small left pleural effusion  Agree with Empiric IV antibiotics pending culture results   Follow culture results  CVP monitoring  IV vasopressors for circulatory shock refractory to fluids to maintain SBP> 90  Transfuse prn to maintain Hgb > 7  Labs to follow electrolytes, renal function and and blood counts  Bronchial hygiene with respiratory therapy techniques, bronchodilators  Pt needs IV fluids with additives and Drug therapy requiring intensive monitoring for toxicity  Prescription drug management with home med reconciliation reviewed  DVT, SUP prophylaxis  Will be available to assist in medical management while in the CCU pending disposition     [x] High complexity decision making was performed  [x] See my orders for details  HPI  77-year-old lady came in because of difficulty breathing along with having cough past medical history of coronary artery disease multiple myeloma received chemotherapy yesterday and she was complaining of leg swelling shortness of breath and dyspnea she also had a history of COPD was with me regularly along with coronary artery disease, she is starting having wheezing using inhalers she is not on any oxygen at home so came to the hospital got admitted to ICU     PMH:  has a past medical history of Arthritis, Asthma, Cancer ([facility] [address] Chronic pain, COPD (chronic obstructive pulmonary disease) ([facility] [address] Depressive disorder (9/22/2020), Diabetes ([facility] [address] Essential hypertension (9/22/2020), GERD (gastroesophageal reflux disease), Hypothyroidism (9/22/2020), Malignant tumor of breast ([address] (9/22/2020), Osteoarthritis (9/22/2020), Pure hypercholesterolemia (9/22/2020), Sleep apnea, Thyroid disease (1980), and Type 2 diabetes mellitus without complication ([facility] [address] (0/10/4061). PSH:   has a past surgical history that includes hx hymenectomy; hx heent; hx colonoscopy (10/20/2017); hx colonoscopy (2014); hx colonoscopy (2007); pr unlisted procedure breast (Right, 02/2016); pr mastectomy partial (Right, 2016); hx cholecystectomy (2013); and hx orthopaedic (Left, 07/07/2020). FHX: family history includes Cancer in her father; Diabetes in her brother, brother, brother, and sister; Heart Attack in her mother; Heart Disease in her brother, brother, and mother; Hypertension in her mother; Consuelo Limb in her brother; Lung Disease in her brother; Other in her father; Ovarian Cancer in her sister; Thyroid Disease in her mother. SHX:  reports that she has been smoking cigarettes. She has a 50.00 pack-year smoking history. She has never used smokeless tobacco. She reports that she does not currently use alcohol. She reports that she does not use drugs.     ALL:   Allergies   Allergen Reactions    Ppd Black Rubber Mix Other (comments)     Cellulitis    Erythromycin Base Unknown (comments)    Other Medication Other (comments)     TUBERCULOSIS  PT GETS CELLULITIS            Parafon Forte Dsc [Chlorzoxazone] Unknown (comments)    Ampicillin Unknown (comments) and Rash        MEDS:   [x] Reviewed - As Below   [] Not reviewed    Current Facility-Administered Medications   Medication metoprolol tartrate (LOPRESSOR) tablet 25 mg    dilTIAZem (CARDIZEM) 125 mg in 0.9% sodium chloride 125 mL ([id number])    furosemide (LASIX) injection 40 mg    doxycycline (VIBRAMYCIN) capsule 100 mg    albuterol-ipratropium (DUO-NEB) 2.5 MG-0.5 MG/3 ML    albuterol-ipratropium (DUO-NEB) 2.5 MG-0.5 MG/3 ML    acyclovir (ZOVIRAX) capsule 400 mg    enoxaparin (LOVENOX) injection 100 mg    anastrozole (ARIMIDEX) tablet 1 mg    aspirin delayed-release tablet 81 mg    cholecalciferol (VITAMIN D3) (5000 Units/125 mcg) tablet 5,000 Units    venlafaxine-SR (EFFEXOR-XR) capsule 150 mg    [Held by provider] amLODIPine (NORVASC) tablet 10 mg    glipiZIDE (GLUCOTROL) tablet 5 mg    levothyroxine (SYNTHROID) tablet 100 mcg    hydrOXYzine pamoate (VISTARIL) capsule 25 mg    HYDROcodone-acetaminophen (NORCO) 5-325 mg per tablet 1 Tablet    pantoprazole (PROTONIX) tablet 40 mg    alogliptin (NESINA) tablet 12.5 mg    budesonide-formoteroL (SYMBICORT) 160-4.5 mcg/actuation HFA inhaler 2 Puff    And    [Held by provider] tiotropium bromide (SPIRIVA RESPIMAT) 2.5 mcg /actuation    insulin lispro (HUMALOG) injection    glucose chewable tablet 16 g    glucagon (GLUCAGEN) injection 1 mg    dextrose 10% infusion 0-250 mL    sodium chloride (NS) flush 5-40 mL    sodium chloride (NS) flush 5-40 mL    albuterol CONCENTRATE 2.5mg/0.5 mL neb soln    methylPREDNISolone (PF) (Solu-MEDROL) injection 37.5 mg    acetaminophen (TYLENOL) tablet 650 mg    ondansetron (ZOFRAN ODT) tablet 4 mg    docusate sodium (COLACE) capsule 100 mg      MAR reviewed and pertinent medications noted or modified as needed   Current Facility-Administered Medications   Medication    metoprolol tartrate (LOPRESSOR) tablet 25 mg    dilTIAZem (CARDIZEM) 125 mg in 0.9% sodium chloride 125 mL ([id number])    furosemide (LASIX) injection 40 mg    doxycycline (VIBRAMYCIN) capsule 100 mg    albuterol-ipratropium (DUO-NEB) 2.5 MG-0.5 MG/3 ML    albuterol-ipratropium (DUO-NEB) 2.5 MG-0.5 MG/3 ML    acyclovir (ZOVIRAX) capsule 400 mg    enoxaparin (LOVENOX) injection 100 mg    anastrozole (ARIMIDEX) tablet 1 mg    aspirin delayed-release tablet 81 mg    cholecalciferol (VITAMIN D3) (5000 Units/125 mcg) tablet 5,000 Units    venlafaxine-SR (EFFEXOR-XR) capsule 150 mg    [Held by provider] amLODIPine (NORVASC) tablet 10 mg    glipiZIDE (GLUCOTROL) tablet 5 mg    levothyroxine (SYNTHROID) tablet 100 mcg    hydrOXYzine pamoate (VISTARIL) capsule 25 mg    HYDROcodone-acetaminophen (NORCO) 5-325 mg per tablet 1 Tablet    pantoprazole (PROTONIX) tablet 40 mg    alogliptin (NESINA) tablet 12.5 mg    budesonide-formoteroL (SYMBICORT) 160-4.5 mcg/actuation HFA inhaler 2 Puff    And    [Held by provider] tiotropium bromide (SPIRIVA RESPIMAT) 2.5 mcg /actuation    insulin lispro (HUMALOG) injection    glucose chewable tablet 16 g    glucagon (GLUCAGEN) injection 1 mg    dextrose 10% infusion 0-250 mL    sodium chloride (NS) flush 5-40 mL    sodium chloride (NS) flush 5-40 mL    albuterol CONCENTRATE 2.5mg/0.5 mL neb soln    methylPREDNISolone (PF) (Solu-MEDROL) injection 37.5 mg    acetaminophen (TYLENOL) tablet 650 mg    ondansetron (ZOFRAN ODT) tablet 4 mg    docusate sodium (COLACE) capsule 100 mg      PMH:  has a past medical history of Arthritis, Asthma, Cancer ([facility] [address] Chronic pain, COPD (chronic obstructive pulmonary disease) ([facility] [address] Depressive disorder (9/22/2020), Diabetes ([facility] [address] Essential hypertension (9/22/2020), GERD (gastroesophageal reflux disease), Hypothyroidism (9/22/2020), Malignant tumor of breast ([facility] [address] (9/22/2020), Osteoarthritis (9/22/2020), Pure hypercholesterolemia (9/22/2020), Sleep apnea, Thyroid disease (1980), and Type 2 diabetes mellitus without complication ([facility] [address] (8/93/4818).   PSH:   has a past surgical history that includes hx hymenectomy; hx heent; hx colonoscopy (10/20/2017); hx colonoscopy (2014); hx colonoscopy (2007); pr unlisted procedure breast (Right, 02/2016); pr mastectomy partial (Right, ); hx cholecystectomy (); and hx orthopaedic (Left, 2020). FHX: family history includes Cancer in her father; Diabetes in her brother, brother, brother, and sister; Heart Attack in her mother; Heart Disease in her brother, brother, and mother; Hypertension in her mother; Everrett Ferdinand in her brother; Lung Disease in her brother; Other in her father; Ovarian Cancer in her sister; Thyroid Disease in her mother. SHX:  reports that she has been smoking cigarettes. She has a 50.00 pack-year smoking history. She has never used smokeless tobacco. She reports that she does not currently use alcohol. She reports that she does not use drugs. ROS:A comprehensive review of systems was negative except for that written in the HPI. Hemodynamics:    CO:    CI:    CVP:    SVR:   PAP Systolic:    PAP Diastolic:    PVR:    LK40:        Ventilator Settings:      Mode Rate TV Press PEEP FiO2 PIP Min. Vent                              Vital Signs: Telemetry:    AFIB Intake/Output:   Visit Vitals  BP (!) 112/55   Pulse (!) 130   Temp 98.1 °F (36.7 °C)   Resp 25   Ht 5' 4\" (1.626 m)   Wt 103.9 kg (229 lb)   SpO2 98%   BMI 39.31 kg/m²       Temp (24hrs), Av.9 °F (36.6 °C), Min:97.5 °F (36.4 °C), Max:98.1 °F (36.7 °C)        O2 Device: Nasal cannula O2 Flow Rate (L/min): 3 l/min       Wt Readings from Last 4 Encounters:   23 103.9 kg (229 lb)   23 105.2 kg (232 lb)   22 103.9 kg (229 lb)   22 101.6 kg (224 lb)        No intake or output data in the 24 hours ending 23 1628    Last shift:      No intake/output data recorded. Last 3 shifts: No intake/output data recorded. Physical Exam:     General:  female; on oxygen 2 L nasal cannula  HEENT: NCAT, poor dentition, lips and mucosa dry  Eyes: anicteric; conjunctiva clear  Neck: no nodes,  trach midline; no accessory MM use.   Chest: no deformity,   Cardiac: IR regular; no murmur;   Lungs: distant breath sounds; bilateral wheezes  Abd: soft, NT, hypoactive BS  Ext: no edema; no joint swelling;  No clubbing  : NO payne, clear urine  Neuro: Alert awake  Psych- no agitation, oriented to person;   Skin: warm, dry, no cyanosis;   Pulses: 1-2+ Bilateral pedal, radial  Capillary: brisk; pale      DATA:    MAR reviewed and pertinent medications noted or modified as needed  MEDS:   Current Facility-Administered Medications   Medication    metoprolol tartrate (LOPRESSOR) tablet 25 mg    dilTIAZem (CARDIZEM) 125 mg in 0.9% sodium chloride 125 mL ([id number])    furosemide (LASIX) injection 40 mg    doxycycline (VIBRAMYCIN) capsule 100 mg    albuterol-ipratropium (DUO-NEB) 2.5 MG-0.5 MG/3 ML    albuterol-ipratropium (DUO-NEB) 2.5 MG-0.5 MG/3 ML    acyclovir (ZOVIRAX) capsule 400 mg    enoxaparin (LOVENOX) injection 100 mg    anastrozole (ARIMIDEX) tablet 1 mg    aspirin delayed-release tablet 81 mg    cholecalciferol (VITAMIN D3) (5000 Units/125 mcg) tablet 5,000 Units    venlafaxine-SR (EFFEXOR-XR) capsule 150 mg    [Held by provider] amLODIPine (NORVASC) tablet 10 mg    glipiZIDE (GLUCOTROL) tablet 5 mg    levothyroxine (SYNTHROID) tablet 100 mcg    hydrOXYzine pamoate (VISTARIL) capsule 25 mg    HYDROcodone-acetaminophen (NORCO) 5-325 mg per tablet 1 Tablet    pantoprazole (PROTONIX) tablet 40 mg    alogliptin (NESINA) tablet 12.5 mg    budesonide-formoteroL (SYMBICORT) 160-4.5 mcg/actuation HFA inhaler 2 Puff    And    [Held by provider] tiotropium bromide (SPIRIVA RESPIMAT) 2.5 mcg /actuation    insulin lispro (HUMALOG) injection    glucose chewable tablet 16 g    glucagon (GLUCAGEN) injection 1 mg    dextrose 10% infusion 0-250 mL    sodium chloride (NS) flush 5-40 mL    sodium chloride (NS) flush 5-40 mL    albuterol CONCENTRATE 2.5mg/0.5 mL neb soln    methylPREDNISolone (PF) (Solu-MEDROL) injection 37.5 mg    acetaminophen (TYLENOL) tablet 650 mg    ondansetron (ZOFRAN ODT) tablet 4 mg    docusate sodium (COLACE) capsule 100 mg        Labs:    Recent Labs     03/29/23  1756   WBC 10.9   HGB 11.4*   *     Recent Labs     03/30/23  0306 03/29/23  1756    139   K 4.2 4.0   * 110*   CO2 18* 22   * 96   BUN 17 18   CREA 1.50* 1.61*   CA 7.5* 8.2*   PHOS 3.2  --    LAC  --  1.4   ALB 2.5* 2.9*   ALT  --  15     No results for input(s): PH, PCO2, PO2, HCO3, FIO2 in the last 72 hours. No results for input(s): CPK, CKNDX, TROIQ in the last 72 hours. No lab exists for component: CPKMB  No results found for: BNPP, BNP   Lab Results   Component Value Date/Time    Culture result: No growth after 4 hours 03/29/2023 05:56 PM    Culture result: MRSA NOT PRESENT 06/23/2020 02:14 PM    Culture result:  06/23/2020 02:14 PM         Screening of patient nares for MRSA is for surveillance purposes and, if positive, to facilitate isolation considerations in high risk settings. It is not intended for automatic decolonization interventions per se as regimens are not sufficiently effective to warrant routine use. Lab Results   Component Value Date/Time    TSH 3.360 06/01/2022 11:58 AM        Imaging:    Results from Hospital Encounter encounter on 03/29/23    XR CHEST SNGL V    Narrative  EXAM: XR CHEST SNGL V    INDICATION: wheezing, crackles, 87% edema    COMPARISON: 5/10/2018. FINDINGS: A single view of the chest demonstrates a small left pleural effusion. Bilateral upper lobe and left lower lobe pulmonary infiltrates are noted. The  cardiac and mediastinal contours and pulmonary vascularity are normal. Old left  clavicle fracture is evident. Impression  Small left pleural effusion with bilateral pulmonary infiltrates. Results from [place] encounter on 06/09/22    CT CHEST WO CONT    Narrative  CT dose reduction was achieved through use of a standardized protocol tailored  for this examination and automatic exposure control for dose modulation.     Noncontrast study is a follow-up from one year ago and shows mild emphysema and  subpleural fibrosis with some geographic aeration differences, matching findings  of previous. No evidence of nodule, edema or consolidation. Central airways are  open. No mediastinal or hilar adenopathy. Normal caliber aorta. No pleural pericardial  effusion. No significant upper abdominal or chest wall finding    Impression  No change. No active findings      This care involved high complexity decision making which includes independently reviewing the patient's past medical records, current laboratory results, medication profiles that were immediately available to me and actual Xray images at the bedside in order to assess, support vital system function, and to treat this degree of vital organ system failure, and to prevent further life threatening deterioration of the patients condition. I was in direct communication with the nursing staff throughout this time. Medical Decision Making Today  Reviewed the flowsheet and previous days notes  Reviewed and summarized records or history from previous days note or discussions with staff, family  Parenteral controlled substances - Reviewed/ Adjusted / [place] Mayor / Started  High Risk Drug therapy requiring intensive monitoring for toxicity: eg steroids, pressors, antibiotics  Review and order of Clinical lab tests  Review and Order of Radiology tests  Review and Order of Medicine tests  Independent visualization of radiologic Images  Reviewed Ventilator / NiPPV  I have personally reviewed the patients ECG / Telemetry  Diagnostic endoscopies with identified risk factors    I have provided total of 55  minutes of critical care time rendering care exclusive of any procedures. During this entire length of time the patient's condition was unstable, unpredictable and critically ill in the CCU/ ICU.  I was immediately available to the patient whose care required several interactions with nursing, multidisciplinary team members leading to multiple interventions with fluid resuscitation and medication adjustments to optimize respiratory support, hemodynamic treatment, medication changes based on repeat labs results, reviews, exams and assessments. The reason for providing this level of medical care was due to a critical illness that impaired one or more vital organ systems, such that there was a high probability of sudden or life threatening deterioration in the patient's condition.

## 2023-03-30 NOTE — ED NOTES
pt remains in a fib uncontrolled, pt received lopressor 2.5mg IV at 0623 and a PO dose at 0830. pt HR remains 130-150s. pt denies chest pain/sob. Provider Vivian notified, new orders received to start Cardizem gtt (titrate) and obtain ekg.

## 2023-03-30 NOTE — CONSULTS
CARDIOLOGY CONSULTATION    REASON FOR CONSULT: Atrial fibrillation    REQUESTING PROVIDER: Dr. Escobedo Boy:  Shortness of breath    HISTORY OF PRESENT ILLNESS:  Katheleen Gaucher is a 74y.o. year-old female with past medical history significant for nonobstructive CAD, hypertension, COPD, multiple myeloma currently undergoing treatment who was evaluated today due to shortness of breath. She states she has been having progressively worsening shortness of breath since having a reaction to MM treatment last week. She was treated at [facility] and had a virus at that time. However shortness of breath progressively worsened and when she went for treatment yesterday, she was referred to the ED. She notes lower extremity swelling. Endorses orthopnea. No chest pain noted. She is followed by Dr. Hawk Otero in clinic, 7/2022 CCTA with nonobstructive CAD, echo in 6/2022 with EF 60%, mildly dilated LV, mildly dilated LA. Records from hospital admission course thus far reviewed. Telemetry reviewed. PAF, RVR when in AF    INPATIENT MEDICATIONS:  Home medications reviewed.     Current Facility-Administered Medications:     metoprolol tartrate (LOPRESSOR) tablet 25 mg, 25 mg, Oral, Q12H, Hans Sheridan MD    albuterol-ipratropium (DUO-NEB) 2.5 MG-0.5 MG/3 ML, 3 mL, Nebulization, Q6H RT, Hans Sheridan MD    dilTIAZem (CARDIZEM) 125 mg in 0.9% sodium chloride 125 mL ([id number]), 0-15 mg/hr, IntraVENous, TITRATE, VivianCarlos aguila MD, Last Rate: 12.5 mL/hr at 03/30/23 1158, 12.5 mg/hr at 03/30/23 1158    furosemide (LASIX) injection 40 mg, 40 mg, IntraVENous, BID, Suzy Rodriguez NP, 40 mg at 03/30/23 1136    anastrozole (ARIMIDEX) tablet 1 mg, 1 mg, Oral, DAILY, Hans Sheridan MD, 1 mg at 03/30/23 1125    aspirin delayed-release tablet 81 mg, 81 mg, Oral, DAILY, Hans Sheridan MD, 81 mg at 03/30/23 0834    cholecalciferol (VITAMIN D3) (5000 Units/125 mcg) tablet 5,000 Units, 5,000 Units, Oral, DAILY, Maggi Lizama MD, 5,000 Units at 03/30/23 0834    venlafaxine-SR (EFFEXOR-XR) capsule 150 mg, 150 mg, Oral, DAILY, Maggi Lizama MD, 150 mg at 03/30/23 1048    [Held by provider] amLODIPine (NORVASC) tablet 10 mg, 10 mg, Oral, DAILY, Maggi Lizama MD, 10 mg at 03/30/23 4524    glipiZIDE (GLUCOTROL) tablet 5 mg, 5 mg, Oral, ACB, Maggi Lizama MD    levothyroxine (SYNTHROID) tablet 100 mcg, 100 mcg, Oral, ACB, Maggi Lizama MD, 100 mcg at 03/30/23 0976    hydrOXYzine pamoate (VISTARIL) capsule 25 mg, 25 mg, Oral, QHS PRN, Maggi Lizama MD, 25 mg at 03/29/23 2356    HYDROcodone-acetaminophen (NORCO) 5-325 mg per tablet 1 Tablet, 1 Tablet, Oral, QID PRN, Maggi Lizama MD, 1 Tablet at 03/30/23 0329    acyclovir (ZOVIRAX) tablet 400 mg, 400 mg, Oral, BID, Maggi Lizama MD, 400 mg at 03/30/23 1125    pantoprazole (PROTONIX) tablet 40 mg, 40 mg, Oral, ACB, Maggi Lizama MD, 40 mg at 03/30/23 0834    alogliptin (NESINA) tablet 12.5 mg, 12.5 mg, Oral, DAILY, Maggi Lizama MD, 12.5 mg at 03/30/23 1125    budesonide-formoteroL (SYMBICORT) 160-4.5 mcg/actuation HFA inhaler 2 Puff, 2 Puff, Inhalation, BID RT **AND** [Held by provider] tiotropium bromide (SPIRIVA RESPIMAT) 2.5 mcg /actuation, 2 [place], Inhalation, DAILY, Maggi Lizama MD    insulin lispro (HUMALOG) injection, , SubCUTAneous, AC&HS, Maggi Lizama MD, 3 Units at 03/30/23 1130    glucose chewable tablet 16 g, 4 Tablet, Oral, PRN, Maggi Lizama MD    glucagon (GLUCAGEN) injection 1 mg, 1 mg, IntraMUSCular, PRN, Maggi Lizama MD    dextrose 10% infusion 0-250 mL, 0-250 mL, IntraVENous, PRN, Maggi Lizama MD    sodium chloride (NS) flush 5-40 mL, 5-40 mL, IntraVENous, Q8H, Maggi Lizama MD, 10 mL at 03/30/23 0620    sodium chloride (NS) flush 5-40 mL, 5-40 mL, IntraVENous, PRN, Maggi Lizama MD albuterol CONCENTRATE 2.5mg/0.5 mL neb soln, 2.5 mg, Nebulization, Q4H PRN, Jamel Barrientos MD    methylPREDNISolone (PF) (Solu-MEDROL) injection 37.5 mg, 37.5 mg, IntraVENous, Q8H, Jamel Barrientos MD, 37.5 mg at 03/30/23 0620    acetaminophen (TYLENOL) tablet 650 mg, 650 mg, Oral, Q4H PRN, Jamel Barrientos MD    ondansetron (ZOFRAN ODT) tablet 4 mg, 4 mg, Oral, Q6H PRN, Jamel Barrientos MD    docusate sodium (COLACE) capsule 100 mg, 100 mg, Oral, BID, Jamel Barrientos MD, 100 mg at 03/30/23 0833    enoxaparin (LOVENOX) injection 40 mg, 40 mg, SubCUTAneous, Q24H, Jamel Barrientos MD, 40 mg at 03/29/23 2239    Current Outpatient Medications:     pantoprazole (PROTONIX) 40 mg tablet, Take 40 mg by mouth daily. , Disp: , Rfl:     dexAMETHasone (DECADRON) 4 mg tablet, Take 40 mg by mouth every seven (7) days. , Disp: , Rfl:     SITagliptin phosphate (Januvia) 100 mg tablet, Take 100 mg by mouth daily. , Disp: , Rfl:     HYDROcodone-acetaminophen (NORCO) 5-325 mg per tablet, Take 1 Tablet by mouth four (4) times daily as needed for Severe Pain., Disp: , Rfl:     acyclovir (ZOVIRAX) 400 mg tablet, Take 400 mg by mouth two (2) times a day., Disp: , Rfl:     rosuvastatin (CRESTOR) 40 mg tablet, Take 1 Tablet by mouth nightly. Indications: high cholesterol and high triglycerides, Disp: 90 Tablet, Rfl: 1    hydrOXYzine pamoate (VISTARIL) 50 mg capsule, TAKE 1 TO 2 CAPSULES BY MOUTH EVERY NIGHT 30 MINUTES BEFORE BEDTIME FOR INSOMNIA, Disp: , Rfl:     bortezomib (VELCADE INJECTION), by Injection route., Disp: , Rfl:     glipiZIDE (GLUCOTROL) 5 mg tablet, Take 1 tab by mouth 30 min prior to breakfast daily for diabetes. , Disp: 90 Tablet, Rfl: 1    levothyroxine (SYNTHROID) 100 mcg tablet, Take 1 Tablet by mouth Daily (before breakfast). Indications: a condition with low thyroid hormone levels, Disp: 90 Tablet, Rfl: 1    empagliflozin (Jardiance) 10 mg tablet, Take 1 Tablet by mouth daily. , Disp: 90 Tablet, Rfl: 3    venlafaxine-SR (EFFEXOR-XR) 150 mg capsule, Take 1 Capsule by mouth daily. , Disp: 90 Capsule, Rfl: 1    amLODIPine (NORVASC) 10 mg tablet, Take 1 Tablet by mouth daily. , Disp: 90 Tablet, Rfl: 1    furosemide (LASIX) 20 mg tablet, Take 1 Tablet by mouth daily. , Disp: 90 Tablet, Rfl: 1    Trelegy Ellipta 100-62.5-25 mcg inhaler, Take 1 Puff by inhalation daily. , Disp: , Rfl:     anastrozole (ARIMIDEX) 1 mg tablet, Take 1 mg by mouth daily. , Disp: , Rfl:     aspirin delayed-release 81 mg tablet, Take 81 mg by mouth daily. , Disp: , Rfl:     omega-3 fatty acids/dha/epa (MEGARED PLANT-OMEGA-3 PO), Take 800 mg by mouth daily. , Disp: , Rfl:     vitamin E (AQUA GEMS) 400 unit capsule, Take 400 Units by mouth daily. , Disp: , Rfl:     cholecalciferol (VITAMIN D3) (5000 Units/125 mcg) tab tablet, Take 5,000 Units by mouth daily. , Disp: , Rfl:     omeprazole (PRILOSEC) 20 mg capsule, Take 20 mg by mouth Daily (before breakfast). , Disp: , Rfl:     lenalidomide 10 mg cap, Take  by mouth., Disp: , Rfl:      ALLERGIES:  Allergies reviewed with the patient,   Allergies   Allergen Reactions    Ppd Black Rubber Mix Other (comments)     Cellulitis    Erythromycin Base Unknown (comments)    Other Medication Other (comments)     TUBERCULOSIS  PT GETS CELLULITIS            Parafon Forte Dsc [Chlorzoxazone] Unknown (comments)    Ampicillin Unknown (comments) and Rash    . FAMILY HISTORY:  Family history reviewed. SOCIAL HISTORY:  Notable for daily tobacco use, no heavy alcohol or illicit drug use. REVIEW OF SYSTEMS:  Complete review of systems performed, pertinents noted above, all other systems are negative.     PHYSICAL EXAMINATION:    General:  Alert, appears mildly uncomfortable  Cardiovascular:  Irregularly irregular, tachycardic, + murmur  Respiratory:  Lungs have wheezes  Abdomen:  Soft, nontender  Extremities:  + lower extremity edema  Skin:  Dry, warm  Psych:  Normal affect    Visit Vitals  BP (!) 121/96   Pulse (!) 128   Temp 98 °F (36.7 °C)   Resp 23   Ht 5' 4\" (1.626 m)   Wt 103.9 kg (229 lb)   SpO2 100%   BMI 39.31 kg/m²       Recent labs results and imaging reviewed. Discussed case with Dr. Nathen Forrest and our impression and recommendations are as follows:  Atrial fibrillation, new onset rate elevated  Continues in and out of AF, may require amiodarone, but will try dilt for now as that has been initiated  Recommend [address], await hematology recs  Shortness of breath, likely multifactorial given COPD and AF RVR  Continue to treat rate, now on dilt gtt goal rate less than 100  Obtain echo to assess EF, valves  Start IV lasix now  Check venous duplexes given lower extremity edema  Followed by Dr. Ramirez Headings for COPD  Nonobstructive CAD, no chest pain, continue medical management  Multiple myeloma, consult hematology for recommendations regarding anticoaguation  Tobacco abuse, needs to quit    Thank you for involving us in the care of this patient. Please do not hesitate to call me or Dr. Nathen Forrest if additional questions arise.     Henri Dominguez, NP  3/30/2023

## 2023-03-30 NOTE — ROUTINE PROCESS
Patient transported from ED 5 at 1500 by writer and Xi Queen. Cardizem gtt running at 12.5ml/hr. All belongings brought with patient and currently on 3L NC. Admission skin assessment completed by Saintclair Cordia RN and Ronaldo Paige Son TAMARA. Excoriation noted under bilateral breasts and under abdominal folds. Scratches also noted on bilateral lower extremities. Skin in flaky and thin.

## 2023-03-30 NOTE — ED NOTES
Assumed care of patient. Bedside shift report received from [facility]. Patient resting on stretcher. Connected to continuous cardiac and SpO2 monitoring. Stretcher at locked and lowest position. Call light within reach. Patient receiving O2 2L NC. Connected to female external urinary catheter. Patient alert and oriented.

## 2023-03-30 NOTE — ED NOTES
Assumed care of pt 0700, pt HR remains 140-156. Provider artem made aware, order received for metoprolol 25mg PO now.

## 2023-03-30 NOTE — PROGRESS NOTES
SGLT-2 Review  Medication: Empagliflozin 10 mg every 24 hours  Indication: DM  Plan: Discontinue while inpatient per [facility] P&T Committee Protocol with respect to therapeutic substitutions. Pharmacy will continue to monitor patient daily and will make dosage adjustments based upon changing renal function.

## 2023-03-30 NOTE — ED NOTES
Received care of patient, called doctor to determine if he wants the patient to go to ICU due to patient still being in afib with rvr and having to titrate Cardizem drip. Doctor gave verbal order for admission orders to be switched to ICU. Currently alert and oriented. Cardizem drip going at 12.5 at this time.

## 2023-03-30 NOTE — PROGRESS NOTES
Hospitalist Progress Note    NAME: Alie Juan   :  1950   MRN:  [id number]     Subjective:   Daily Progress Note: 3/30/2023 1:55 PM      Chief complaint: Worsening shortness of breath  3/30/2023:  Patient seen and examined in ED, chart was reviewed. Spoke with  at bedside. Patient progressively getting short of breath for last several days  She was using 's home oxygen. She is noted to have A-fib with RVR on Cardizem drip,   cardiology on case recommending anticoagulation if okay with hematology  Patient continues to have intermittent palpitations and dyspnea on exertion. Patient being transferred to ICU level of care at this time. Bilateral lower extremity Dopplers noted to be negative.        Current Facility-Administered Medications   Medication Dose Route Frequency    metoprolol tartrate (LOPRESSOR) tablet 25 mg  25 mg Oral Q12H    albuterol-ipratropium (DUO-NEB) 2.5 MG-0.5 MG/3 ML  3 mL Nebulization Q6H RT    dilTIAZem (CARDIZEM) 125 mg in 0.9% sodium chloride 125 mL ([id number])  0-15 mg/hr IntraVENous TITRATE    furosemide (LASIX) injection 40 mg  40 mg IntraVENous BID    doxycycline (VIBRAMYCIN) capsule 100 mg  100 mg Oral Q12H    anastrozole (ARIMIDEX) tablet 1 mg  1 mg Oral DAILY    aspirin delayed-release tablet 81 mg  81 mg Oral DAILY    cholecalciferol (VITAMIN D3) (5000 Units/125 mcg) tablet 5,000 Units  5,000 Units Oral DAILY    venlafaxine-SR (EFFEXOR-XR) capsule 150 mg  150 mg Oral DAILY    [Held by provider] amLODIPine (NORVASC) tablet 10 mg  10 mg Oral DAILY    glipiZIDE (GLUCOTROL) tablet 5 mg  5 mg Oral ACB    levothyroxine (SYNTHROID) tablet 100 mcg  100 mcg Oral ACB    hydrOXYzine pamoate (VISTARIL) capsule 25 mg  25 mg Oral QHS PRN    HYDROcodone-acetaminophen (NORCO) 5-325 mg per tablet 1 Tablet  1 Tablet Oral QID PRN    acyclovir (ZOVIRAX) tablet 400 mg  400 mg Oral BID    pantoprazole (PROTONIX) tablet 40 mg  40 mg Oral ACB    alogliptin (NESINA) tablet 12.5 mg  12.5 mg Oral DAILY    budesonide-formoteroL (SYMBICORT) 160-4.5 mcg/actuation HFA inhaler 2 Puff  2 Puff Inhalation BID RT    And    [Held by provider] tiotropium bromide (SPIRIVA RESPIMAT) 2.5 mcg /actuation  2 Puff Inhalation DAILY    insulin lispro (HUMALOG) injection   SubCUTAneous AC&HS    glucose chewable tablet 16 g  4 Tablet Oral PRN    glucagon (GLUCAGEN) injection 1 mg  1 mg IntraMUSCular PRN    dextrose 10% infusion 0-250 mL  0-250 mL IntraVENous PRN    sodium chloride (NS) flush 5-40 mL  5-40 mL IntraVENous Q8H    sodium chloride (NS) flush 5-40 mL  5-40 mL IntraVENous PRN    albuterol CONCENTRATE 2.5mg/0.5 mL neb soln  2.5 mg Nebulization Q4H PRN    methylPREDNISolone (PF) (Solu-MEDROL) injection 37.5 mg  37.5 mg IntraVENous Q8H    acetaminophen (TYLENOL) tablet 650 mg  650 mg Oral Q4H PRN    ondansetron (ZOFRAN ODT) tablet 4 mg  4 mg Oral Q6H PRN    docusate sodium (COLACE) capsule 100 mg  100 mg Oral BID    enoxaparin (LOVENOX) injection 40 mg  40 mg SubCUTAneous Q24H     Current Outpatient Medications   Medication Sig    dexAMETHasone (DECADRON) 4 mg tablet Take 40 mg by mouth every seven (7) days. SITagliptin phosphate (JANUVIA) 100 mg tablet Take 100 mg by mouth daily. HYDROcodone-acetaminophen (NORCO) 5-325 mg per tablet Take 1 Tablet by mouth four (4) times daily as needed for Severe Pain. acyclovir (ZOVIRAX) 400 mg tablet Take 400 mg by mouth two (2) times a day. omeprazole (PRILOSEC) 20 mg capsule Take 20 mg by mouth two (2) times a day. rosuvastatin (CRESTOR) 40 mg tablet Take 1 Tablet by mouth nightly. Indications: high cholesterol and high triglycerides    hydrOXYzine pamoate (VISTARIL) 50 mg capsule TAKE 1 TO 2 CAPSULES BY MOUTH EVERY NIGHT 30 MINUTES BEFORE BEDTIME FOR INSOMNIA    bortezomib (VELCADE INJECTION) by Injection route. lenalidomide 10 mg cap Take  by mouth.     glipiZIDE (GLUCOTROL) 5 mg tablet Take 1 tab by mouth 30 min prior to breakfast daily for diabetes. levothyroxine (SYNTHROID) 100 mcg tablet Take 1 Tablet by mouth Daily (before breakfast). Indications: a condition with low thyroid hormone levels    empagliflozin (Jardiance) 10 mg tablet Take 1 Tablet by mouth daily. venlafaxine-SR (EFFEXOR-XR) 150 mg capsule Take 1 Capsule by mouth daily. amLODIPine (NORVASC) 10 mg tablet Take 1 Tablet by mouth daily. furosemide (LASIX) 20 mg tablet Take 1 Tablet by mouth daily. Trelegy Ellipta 100-62.5-25 mcg inhaler Take 1 Puff by inhalation daily. anastrozole (ARIMIDEX) 1 mg tablet Take 1 mg by mouth daily. aspirin delayed-release 81 mg tablet Take 81 mg by mouth daily. omega-3 fatty acids/dha/epa (MEGARED PLANT-OMEGA-3 PO) Take 800 mg by mouth daily. vitamin E (AQUA GEMS) 400 unit capsule Take 400 Units by mouth daily. cholecalciferol (VITAMIN D3) (5000 Units/125 mcg) tab tablet Take 5,000 Units by mouth daily.           Objective:     Visit Vitals  BP 99/74   Pulse (!) 138   Temp 97.5 °F (36.4 °C)   Resp 20   Ht 5' 4\" (1.626 m)   Wt 103.9 kg (229 lb)   SpO2 94%   BMI 39.31 kg/m²    O2 Flow Rate (L/min): 3 l/min O2 Device: Nasal cannula    Temp (24hrs), Av.8 °F (36.6 °C), Min:97.5 °F (36.4 °C), Max:98 °F (36.7 °C)        PHYSICAL EXAM:  General-obese  Neck-short  CVS-irregular rhythm with tachycardia  Respiratory-symmetric expansion, expiratory wheeze  Abdomen-obese, soft  Extremities-pedal edema  Neuro-alert, normal speech  Psych-normal affect  Skin-no visible rash        Data Review    Recent Results (from the past 24 hour(s))   EKG, 12 LEAD, INITIAL    Collection Time: 23  5:36 PM   Result Value Ref Range    Ventricular Rate 87 BPM    Atrial Rate 87 BPM    P-R Interval 164 ms    QRS Duration 76 ms    Q-T Interval 368 ms    QTC Calculation (Bezet) 442 ms    Calculated P Axis 59 degrees    Calculated R Axis 85 degrees    Calculated T Axis 50 degrees    Diagnosis       Normal sinus rhythm  Low voltage QRS  Septal infarct , age undetermined  Abnormal ECG    Confirmed by [facility] COLEMANArlene BELCHER ([id number]) on 3/30/2023 12:58:25 PM     CBC WITH AUTOMATED DIFF    Collection Time: 03/29/23  5:56 PM   Result Value Ref Range    WBC 10.9 3.6 - 11.0 K/uL    RBC 4.06 3.80 - 5.20 M/uL    HGB 11.4 (L) 11.5 - 16.0 g/dL    HCT 35.2 35.0 - 47.0 %    MCV 86.7 80.0 - 99.0 FL    MCH 28.1 26.0 - 34.0 PG    MCHC 32.4 30.0 - 36.5 g/dL    RDW 15.7 (H) 11.5 - 14.5 %    PLATELET 210 (H) 275 - 400 K/uL    MPV 10.7 8.9 - 12.9 FL    NRBC 0.0 0.0  WBC    ABSOLUTE NRBC 0.00 0.00 - 0.01 K/uL    NEUTROPHILS 75 32 - 75 %    LYMPHOCYTES 13 12 - 49 %    MONOCYTES 9 5 - 13 %    EOSINOPHILS 2 0 - 7 %    BASOPHILS 1 0 - 1 %    IMMATURE GRANULOCYTES 0 0 - 0.5 %    ABS. NEUTROPHILS 8.1 (H) 1.8 - 8.0 K/UL    ABS. LYMPHOCYTES 1.4 0.8 - 3.5 K/UL    ABS. MONOCYTES 1.0 0.0 - 1.0 K/UL    ABS. EOSINOPHILS 0.3 0.0 - 0.4 K/UL    ABS. BASOPHILS 0.1 0.0 - 0.1 K/UL    ABS. IMM. GRANS. 0.0 0.00 - 0.04 K/UL    DF AUTOMATED     METABOLIC PANEL, COMPREHENSIVE    Collection Time: 03/29/23  5:56 PM   Result Value Ref Range    Sodium 139 136 - 145 mmol/L    Potassium 4.0 3.5 - 5.1 mmol/L    Chloride 110 (H) 97 - 108 mmol/L    CO2 22 21 - 32 mmol/L    Anion gap 7 5 - 15 mmol/L    Glucose 96 65 - 100 mg/dL    BUN 18 6 - 20 mg/dL    Creatinine 1.61 (H) 0.55 - 1.02 mg/dL    BUN/Creatinine ratio 11 (L) 12 - 20      eGFR 33 (L) >60 ml/min/1.73m2    Calcium 8.2 (L) 8.5 - 10.1 mg/dL    Bilirubin, total 0.3 0.2 - 1.0 mg/dL    AST (SGOT) 12 (L) 15 - 37 U/L    ALT (SGPT) 15 12 - 78 U/L    Alk.  phosphatase 118 (H) 45 - 117 U/L    Protein, total 6.9 6.4 - 8.2 g/dL    Albumin 2.9 (L) 3.5 - 5.0 g/dL    Globulin 4.0 2.0 - 4.0 g/dL    A-G Ratio 0.7 (L) 1.1 - 2.2     LACTIC ACID    Collection Time: 03/29/23  5:56 PM   Result Value Ref Range    Lactic acid 1.4 0.4 - 2.0 mmol/L   CULTURE, BLOOD, PAIRED    Collection Time: 03/29/23  5:56 PM    Specimen: Blood   Result Value Ref Range    Special Requests: No Special Requests      Culture result: No growth after 4 hours     NT-PRO BNP    Collection Time: 03/29/23  5:56 PM   Result Value Ref Range    NT pro-BNP 2,359 (H) <125 pg/mL   TROPONIN-HIGH SENSITIVITY    Collection Time: 03/29/23  5:56 PM   Result Value Ref Range    Troponin-High Sensitivity 8 0 - 51 ng/L   GLUCOSE, POC    Collection Time: 03/29/23 10:33 PM   Result Value Ref Range    Glucose (POC) 107 (H) 65 - 100 mg/dL    Performed by Irma Davies    HEMOGLOBIN A1C WITH EAG    Collection Time: 03/30/23  3:06 AM   Result Value Ref Range    Hemoglobin A1c 8.1 (H) 4.0 - 5.6 %    Est. average glucose 186 mg/dL   RENAL FUNCTION PANEL    Collection Time: 03/30/23  3:06 AM   Result Value Ref Range    Sodium 141 136 - 145 mmol/L    Potassium 4.2 3.5 - 5.1 mmol/L    Chloride 113 (H) 97 - 108 mmol/L    CO2 18 (L) 21 - 32 mmol/L    Anion gap 10 5 - 15 mmol/L    Glucose 164 (H) 65 - 100 mg/dL    BUN 17 6 - 20 mg/dL    Creatinine 1.50 (H) 0.55 - 1.02 mg/dL    BUN/Creatinine ratio 11 (L) 12 - 20      eGFR 36 (L) >60 ml/min/1.73m2    Calcium 7.5 (L) 8.5 - 10.1 mg/dL    Phosphorus 3.2 2.6 - 4.7 mg/dL    Albumin 2.5 (L) 3.5 - 5.0 g/dL   TROPONIN-HIGH SENSITIVITY    Collection Time: 03/30/23  3:06 AM   Result Value Ref Range    Troponin-High Sensitivity 6 0 - 51 ng/L   URINALYSIS W/ RFLX MICROSCOPIC    Collection Time: 03/30/23  3:19 AM   Result Value Ref Range    Color Yellow/Straw      Appearance Clear Clear      Specific gravity 1.005 1.003 - 1.030      pH (UA) 5.0 5.0 - 8.0      Protein Negative Negative mg/dL    Glucose >300 (A) Negative mg/dL    Ketone 5 (A) Negative mg/dL    Bilirubin Negative Negative      Blood Small (A) Negative      Urobilinogen 0.1 0.1 - 1.0 EU/dL    Nitrites Negative Negative      Leukocyte Esterase Negative Negative      WBC 0-4 0 - 4 /hpf    RBC 0-5 0 - 5 /hpf    Bacteria 1+ (A) Negative /hpf   URINE MICROSCOPIC    Collection Time: 03/30/23  3:19 AM   Result Value Ref Range    WBC 0-4 0 - 4 /hpf    RBC 0-5 0 - 5 /hpf    Bacteria 1+ (A) Negative /hpf   EKG, 12 LEAD, INITIAL    Collection Time: 03/30/23  6:10 AM   Result Value Ref Range    Ventricular Rate 151 BPM    Atrial Rate 153 BPM    QRS Duration 84 ms    Q-T Interval 326 ms    QTC Calculation (Bezet) 516 ms    Calculated R Axis 25 degrees    Calculated T Axis -171 degrees    Diagnosis       Poor data quality, interpretation may be adversely affected  Atrial fibrillation with rapid ventricular response with premature   ventricular or aberrantly conducted complexes  Low voltage QRS  Cannot rule out Anteroseptal infarct (cited on or before 29-MAR-2023)  Abnormal ECG  When compared with ECG of 29-MAR-2023 17:36, (Unconfirmed)  Atrial fibrillation has replaced Sinus rhythm  Vent.  rate has increased BY  64 BPM  Questionable change in initial forces of Anterior leads  Nonspecific T wave abnormality no longer evident in Anterior leads  Conf[facility] ([id number]) on 3/30/2023 12:58:16 PM     TROPONIN-HIGH SENSITIVITY    Collection Time: 03/30/23  6:33 AM   Result Value Ref Range    Troponin-High Sensitivity 8 0 - 51 ng/L   GLUCOSE, POC    Collection Time: 03/30/23  8:23 AM   Result Value Ref Range    Glucose (POC) 176 (H) 65 - 100 mg/dL    Performed by [facility]    EKG, 12 LEAD, INITIAL    Collection Time: 03/30/23 10:32 AM   Result Value Ref Range    Ventricular Rate 129 BPM    Atrial Rate 138 BPM    QRS Duration 86 ms    Q-T Interval 306 ms    QTC Calculation (Bezet) 448 ms    Calculated R Axis -15 degrees    Calculated T Axis -129 degrees    Diagnosis       Atrial fibrillation with rapid ventricular response with premature   ventricular or aberrantly conducted complexes  Low voltage QRS  Cannot rule out Anteroseptal infarct , age undetermined  ST & T wave abnormality, consider inferior ischemia  Abnormal ECG    Confirmed by [facility] ([id number]) on 3/30/2023 12:58:43 PM     GLUCOSE, POC    Collection Time: 03/30/23 11:57 AM   Result Value Ref Range Glucose (POC) 170 (H) 65 - 100 mg/dL    Performed by [facility]      No results found for this visit on 03/29/23. All Micro Results       Procedure Component Value Units Date/Time    CULTURE, BLOOD, PAIRED [[id number]] Collected: 03/29/23 6452    Order Status: Completed Specimen: Blood Updated: 03/30/23 1219     Special Requests: No Special Requests        Culture result: No growth after 4 hours             CXR-IMPRESSION  Small left pleural effusion with bilateral pulmonary infiltrates. Assessment with MDM and DDx/Plan:     Acute hypoxic respiratory failure-  Multifactorial CHF versus COPD versus ? PNA  Monitor in ICU  Chest x-ray showed bilateral infiltrates  Start empiric IV antibiotics  ABG pending  Pulmonary evaluation  Continue oxygen support and wean as tolerated      A-fib with RVR suspected new diagnosis-  IV Cardizem drip, monitor in ICU  Oral Lopressor twice daily  Cardiology on case appreciate input  May need IV amiodarone  May need anticoagulation if okay with hematology    COPD exacerbation-  Continue IV steroids  Continue scheduled DuoNebs  Empiric antibiotics  Check procalcitonin  Pulmonary consulted  ABG pending    Multiple myeloma-hematology consulted for anticoagulation input  Breast cancer-continue previous medications, hematology consulted  Suspected CHF/leg edema -on IV diuretics, echo pending, BNP elevated  DMT2-monitor BG on sliding scale  Hypothyroidism-continue home levothyroxine, TSH pending  CKD stage III-monitor creatinine while on IV diuretics    Advance directive-full code  DVT prophylaxis-subcu Lovenox  Next of kin- at bedside agrees with plan  Social determinants of health-none    Dispo-more than 48 hours pending clinical improvement and medical stability monitor closely in ICU.     Signed By: Pardeep Addison MD     March 30, 2023

## 2023-03-30 NOTE — ROUTINE PROCESS
Cardiology paged and notified [facility] NP by John Mcguire of current HR of 125-135, SBP . Also made aware of Dr Brenda Goff rounding on patient and stated in progress note that is okay with them to start on anticoagulation. No orders at this time.

## 2023-03-30 NOTE — ROUTINE PROCESS
TRANSFER - OUT REPORT:    Verbal report given to kike gianna Ravinder Carrasquillo  being transferred to [facility]  for routine progression of care       Report consisted of patients Situation, Background, Assessment and   Recommendations(SBAR). Information from the following report(s) SBAR was reviewed with the receiving nurse. Lines:   Peripheral IV 03/29/23 Left Antecubital (Active)       Peripheral IV 03/30/23 Anterior; Left Forearm (Active)        Opportunity for questions and clarification was provided.       Patient transported with:   Registered Nurse

## 2023-03-30 NOTE — CONSULTS
Hematology Oncology Consultation    Reason for consult: Myeloma    Admitting Diagnosis: COPD with acute exacerbation ([address] [J44.1]    Impression:     Anticoagulation  -myeloma is prothrombotic in of itself, and some of the therapies for this disease are also prothrombotic  -as such agree that anticoagulation is appropriate tobias now that she has afib    2. Plasma cell myeloma-FISH pending   -newly established with Dr Sarah Elizondo and now has about 1month of frontline therapy completed with RVD (revlamid dexamethasone and velcade)  -continue OP followup  -primary complication of therapy has been hyperglycemia from dex    3. Bony lytic lesions  -on xgeva for this    4 Shortness of breath and volume overload; found to be in afib with RVR  -active with cardiology      Will sign off; feel free to call with questions    Discussion: Jude Tidwell is a  74y.o. year old seen in consultation at the request of Dr. Malathi Woods for myeloma  She saw Dr Sarah Elizondo yesterday and was treated with her velcade. She was referred to the ER for worsening shortness of breath and edema. She was found to be in afib. She was placed on cardizem. Given her high risk afib, cardiology wanted to determine safety of anticoagulation in myeloma.   Imaging:    Reviewed    Labs:        History:  Past Medical History:   Diagnosis Date    Arthritis     Asthma     Cancer ([address]     BREAST CANCER RIGHT BREAST LUMPECTOMY     Chronic pain     COPD (chronic obstructive pulmonary disease) ([address]     Depressive disorder 9/22/2020    Diabetes ([address]     Essential hypertension 9/22/2020    GERD (gastroesophageal reflux disease)     Hypothyroidism 9/22/2020    Malignant tumor of breast ([address] 9/22/2020    Osteoarthritis 9/22/2020    Pure hypercholesterolemia 9/22/2020    Sleep apnea     Thyroid disease 1980    REMOVED     Type 2 diabetes mellitus without complication ([address] 2/85/8331      Past Surgical History:   Procedure Laterality Date    HX CHOLECYSTECTOMY  2013    HX COLONOSCOPY  10/20/2017    HX COLONOSCOPY  2014    HX COLONOSCOPY  2007    HX HEENT      HX HYMENECTOMY      HX ORTHOPAEDIC Left 07/07/2020    left rotator cuff    DC MASTECTOMY PARTIAL Right 2016    DC UNLISTED PROCEDURE BREAST Right 02/2016    LUMPECTOMY       Prior to Admission medications    Medication Sig Start Date End Date Taking? Authorizing Provider   dexAMETHasone (DECADRON) 4 mg tablet Take 40 mg by mouth every seven (7) days. Yes Provider, Historical   SITagliptin phosphate (JANUVIA) 100 mg tablet Take 100 mg by mouth daily. Yes Provider, Historical   HYDROcodone-acetaminophen (NORCO) 5-325 mg per tablet Take 1 Tablet by mouth four (4) times daily as needed for Severe Pain. 3/6/23  Yes Provider, Historical   acyclovir (ZOVIRAX) 400 mg tablet Take 400 mg by mouth two (2) times a day. 3/29/23  Yes Provider, Historical   omeprazole (PRILOSEC) 20 mg capsule Take 20 mg by mouth two (2) times a day. 3/29/23  Yes Provider, Historical   rosuvastatin (CRESTOR) 40 mg tablet Take 1 Tablet by mouth nightly. Indications: high cholesterol and high triglycerides 3/22/23  Yes Analia Hines, SHARI   hydrOXYzine pamoate (VISTARIL) 50 mg capsule TAKE 1 TO 2 CAPSULES BY MOUTH EVERY NIGHT 30 MINUTES BEFORE BEDTIME FOR INSOMNIA 12/2/22  Yes Provider, Historical   bortezomib (VELCADE INJECTION) by Injection route. Yes Provider, Historical   lenalidomide 10 mg cap Take  by mouth. Yes Provider, Historical   glipiZIDE (GLUCOTROL) 5 mg tablet Take 1 tab by mouth 30 min prior to breakfast daily for diabetes. 2/3/23  Yes Yvonne Escalera, DO   levothyroxine (SYNTHROID) 100 mcg tablet Take 1 Tablet by mouth Daily (before breakfast). Indications: a condition with low thyroid hormone levels 2/3/23  Yes Yvonne Escalera, DO   empagliflozin (Jardiance) 10 mg tablet Take 1 Tablet by mouth daily. 11/22/22  Yes Claude Carl, DO   venlafaxine-SR [facility] 150 mg capsule Take 1 Capsule by mouth daily.  11/22/22  Yes Damon Escalera, DO   amLODIPine (NORVASC) 10 mg tablet Take 1 Tablet by mouth daily. 11/22/22  Yes Damon Escalera, DO   furosemide (LASIX) 20 mg tablet Take 1 Tablet by mouth daily. 11/22/22  Yes Damon Escalera, DO   Trelegy Ellipta 100-62.5-25 mcg inhaler Take 1 Puff by inhalation daily. 10/28/21  Yes Provider, Historical   anastrozole (ARIMIDEX) 1 mg tablet Take 1 mg by mouth daily. Yes Provider, Historical   aspirin delayed-release 81 mg tablet Take 81 mg by mouth daily. Yes Provider, Historical   omega-3 fatty acids/dha/epa (MEGARED PLANT-OMEGA-3 PO) Take 800 mg by mouth daily. Yes Provider, Historical   vitamin E (AQUA GEMS) 400 unit capsule Take 400 Units by mouth daily. Yes Provider, Historical   cholecalciferol (VITAMIN D3) (5000 Units/125 mcg) tab tablet Take 5,000 Units by mouth daily.    Yes Provider, Historical     Allergies   Allergen Reactions    Ppd Black Rubber Mix Other (comments)     Cellulitis    Erythromycin Base Unknown (comments)    Other Medication Other (comments)     TUBERCULOSIS  PT GETS CELLULITIS            Parafon Forte Dsc [Chlorzoxazone] Unknown (comments)    Ampicillin Unknown (comments) and Rash      Social History     Tobacco Use    Smoking status: Every Day     Packs/day: 1.00     Years: 50.00     Pack years: 50.00     Types: Cigarettes    Smokeless tobacco: Never   Substance Use Topics    Alcohol use: Not Currently      Family History   Problem Relation Age of Onset    Thyroid Disease Mother     Heart Disease Mother     Hypertension Mother     Heart Attack Mother     Cancer Father         SPINE     Other Father         COMMITTED SUICIDE     Diabetes Sister     Ovarian Cancer Sister     Diabetes Brother     Heart Disease Brother     Diabetes Brother     Heart Disease Brother     Lung Disease Brother     Diabetes Brother     Lung Cancer Brother     Anesth Problems Neg Hx         Current Medications:  Current Facility-Administered Medications   Medication Dose Route Frequency    metoprolol tartrate (LOPRESSOR) tablet 25 mg  25 mg Oral Q12H    dilTIAZem (CARDIZEM) 125 mg in 0.9% sodium chloride 125 mL ([id number])  0-15 mg/hr IntraVENous TITRATE    furosemide (LASIX) injection 40 mg  40 mg IntraVENous BID    doxycycline (VIBRAMYCIN) capsule 100 mg  100 mg Oral Q12H    albuterol-ipratropium (DUO-NEB) 2.5 MG-0.5 MG/3 ML  3 mL Nebulization TID    albuterol-ipratropium (DUO-NEB) 2.5 MG-0.5 MG/3 ML  3 mL Nebulization Q2H PRN    anastrozole (ARIMIDEX) tablet 1 mg  1 mg Oral DAILY    aspirin delayed-release tablet 81 mg  81 mg Oral DAILY    cholecalciferol (VITAMIN D3) (5000 Units/125 mcg) tablet 5,000 Units  5,000 Units Oral DAILY    venlafaxine-SR (EFFEXOR-XR) capsule 150 mg  150 mg Oral DAILY    [Held by provider] amLODIPine (NORVASC) tablet 10 mg  10 mg Oral DAILY    glipiZIDE (GLUCOTROL) tablet 5 mg  5 mg Oral ACB    levothyroxine (SYNTHROID) tablet 100 mcg  100 mcg Oral ACB    hydrOXYzine pamoate (VISTARIL) capsule 25 mg  25 mg Oral QHS PRN    HYDROcodone-acetaminophen (NORCO) 5-325 mg per tablet 1 Tablet  1 Tablet Oral QID PRN    acyclovir (ZOVIRAX) tablet 400 mg  400 mg Oral BID    pantoprazole (PROTONIX) tablet 40 mg  40 mg Oral ACB    alogliptin (NESINA) tablet 12.5 mg  12.5 mg Oral DAILY    budesonide-formoteroL (SYMBICORT) 160-4.5 mcg/actuation HFA inhaler 2 Puff  2 Puff Inhalation BID RT    And    [Held by provider] tiotropium bromide (SPIRIVA RESPIMAT) 2.5 mcg /actuation  2 Puff Inhalation DAILY    insulin lispro (HUMALOG) injection   SubCUTAneous AC&HS    glucose chewable tablet 16 g  4 Tablet Oral PRN    glucagon (GLUCAGEN) injection 1 mg  1 mg IntraMUSCular PRN    dextrose 10% infusion 0-250 mL  0-250 mL IntraVENous PRN    sodium chloride (NS) flush 5-40 mL  5-40 mL IntraVENous Q8H    sodium chloride (NS) flush 5-40 mL  5-40 mL IntraVENous PRN    albuterol CONCENTRATE 2.5mg/0.5 mL neb soln  2.5 mg Nebulization Q4H PRN    methylPREDNISolone (PF) (Solu-MEDROL) injection 37.5 mg  37.5 mg IntraVENous Q8H    acetaminophen (TYLENOL) tablet 650 mg  650 mg Oral Q4H PRN    ondansetron (ZOFRAN ODT) tablet 4 mg  4 mg Oral Q6H PRN    docusate sodium (COLACE) capsule 100 mg  100 mg Oral BID    enoxaparin (LOVENOX) injection 40 mg  40 mg SubCUTAneous Q24H     Current Outpatient Medications   Medication Sig    dexAMETHasone (DECADRON) 4 mg tablet Take 40 mg by mouth every seven (7) days. SITagliptin phosphate (JANUVIA) 100 mg tablet Take 100 mg by mouth daily. HYDROcodone-acetaminophen (NORCO) 5-325 mg per tablet Take 1 Tablet by mouth four (4) times daily as needed for Severe Pain. acyclovir (ZOVIRAX) 400 mg tablet Take 400 mg by mouth two (2) times a day. omeprazole (PRILOSEC) 20 mg capsule Take 20 mg by mouth two (2) times a day. rosuvastatin (CRESTOR) 40 mg tablet Take 1 Tablet by mouth nightly. Indications: high cholesterol and high triglycerides    hydrOXYzine pamoate (VISTARIL) 50 mg capsule TAKE 1 TO 2 CAPSULES BY MOUTH EVERY NIGHT 30 MINUTES BEFORE BEDTIME FOR INSOMNIA    bortezomib (VELCADE INJECTION) by Injection route. lenalidomide 10 mg cap Take  by mouth. glipiZIDE (GLUCOTROL) 5 mg tablet Take 1 tab by mouth 30 min prior to breakfast daily for diabetes. levothyroxine (SYNTHROID) 100 mcg tablet Take 1 Tablet by mouth Daily (before breakfast). Indications: a condition with low thyroid hormone levels    empagliflozin (Jardiance) 10 mg tablet Take 1 Tablet by mouth daily. venlafaxine-SR (EFFEXOR-XR) 150 mg capsule Take 1 Capsule by mouth daily. amLODIPine (NORVASC) 10 mg tablet Take 1 Tablet by mouth daily. furosemide (LASIX) 20 mg tablet Take 1 Tablet by mouth daily. Trelegy Ellipta 100-62.5-25 mcg inhaler Take 1 Puff by inhalation daily. anastrozole (ARIMIDEX) 1 mg tablet Take 1 mg by mouth daily. aspirin delayed-release 81 mg tablet Take 81 mg by mouth daily.     omega-3 fatty acids/dha/epa (MEGARED PLANT-OMEGA-3 PO) Take 800 mg by mouth daily. vitamin E (AQUA GEMS) 400 unit capsule Take 400 Units by mouth daily. cholecalciferol (VITAMIN D3) (5000 Units/125 mcg) tab tablet Take 5,000 Units by mouth daily. Review of Systems:  Constitutional No fevers, chills, night sweats, excessive fatigue or weight loss. Allergic/Immunologic No recent allergic reactions   Eyes No significant visual difficulties. No diplopia. ENMT No problems with hearing, no sore throat, no sinus drainage. Endocrine No hot flashes or night sweats. No cold intolerance, polyuria, or polydipsia   Hematologic/Lymphatic No easy bruising or bleeding. The patient denies any tender or palpable lymph nodes   Breasts No abnormal masses of breast, nipple discharge or pain. Respiratory No dyspnea on exertion, orthopnea, chest pain, cough or hemoptysis. Cardiovascular No anginal chest pain, irregular heart beat, tachycardia, palpitations or orthopnea. Gastrointestinal No nausea, vomiting, diarrhea, constipation, cramping, dysphagia, reflux, heartburn, GI bleeding, or early satiety. No change in bowel habits. Genitourinary (M) No hematuria, dysuria, increased frequency, urgency, hesitancy or incontinence. Musculoskeletal No joint pain, swelling or redness. No decreased range of motion. Integumentary No chronic rashes, inflammation, ulcerations, pruritus, petechiae, purpura, ecchymoses, or skin changes. Neurologic No headache, blurred vision, and no areas of focal weakness or numbness. Normal gait. No sensory problems. Psychiatric No insomnia, depression, katherine or mood swings. No psychotropic drugs. Physical Exam:  Constitutional Alert, cooperative, oriented. Mood and affect appropriate. Appears close to chronological age. Well nourished. Well developed. Head Normocephalic; no scars   Eyes Conjunctivae and sclerae are clear and without icterus. Pupils are reactive and equal.   ENMT Sinuses are nontender.   No oral exudates, ulcers, masses, thrush or mucositis. Oropharynx clear. Tongue normal.   Neck Supple without masses or thyromegaly. No jugular venous distension. Hematologic/Lymphatic No petechiae or purpura. No tender or palpable lymph nodes in the cervical, supraclavicular, axillary or inguinal area. Respiratory Lungs are clear to auscultation without rhonchi or wheezing. Cardiovascular Regular rate and rhythm of heart without murmurs, gallops or rubs. Chest / Line Site Chest is symmetric with no chest wall deformities. Abdomen Non-tender, non-distended, no masses, ascites or hepatosplenomegaly. Good bowel sounds. No guarding or rebound tenderness. No pulsatile masses. Musculoskeletal No tenderness or swelling, normal range of motion without obvious weakness. Extremities No visible deformities, no cyanosis, clubbing or edema. Skin No rashes, scars, or lesions suggestive of malignancy. No petechiae, purpura, or ecchymoses. No excoriations. Neurologic No sensory or motor deficits, normal cerebellar function, normal gait, cranial nerves intact. Psychiatric Alert and oriented times three. Coherent speech. Verbalizes understanding of our discussions today.

## 2023-03-30 NOTE — PROGRESS NOTES
Admission Medication Reconciliation:    Information obtained from:  Patient   RxQuery data available¹:  YES    Comments/Recommendations: Updated PTA meds/reviewed patient's allergies. 1)  Patient was good historian. Provided list, however was unable to remember the strength of her revlimid. Stated she has it filled with the [facility]    2)  Medication changes (since last review): Added  - N/A    Adjusted  - N/A    Removed  - pantoprazole 40 mg - 1 every day     Pharmacy: [facility] ([place] )   [facility] pharmacy benefit data reflects medications filled and processed through the patient's insurance, however   this data does NOT capture whether the medication was picked up or is currently being taken by the patient. Prior to Admission Medications   Prescriptions Last Dose Informant Taking? HYDROcodone-acetaminophen (NORCO) 5-325 mg per tablet  Self Yes   Sig: Take 1 Tablet by mouth four (4) times daily as needed for Severe Pain. SITagliptin phosphate (JANUVIA) 100 mg tablet  Self Yes   Sig: Take 100 mg by mouth daily. Trelegy Ellipta 100-62.5-25 mcg inhaler  Self Yes   Sig: Take 1 Puff by inhalation daily. acyclovir (ZOVIRAX) 400 mg tablet  Self Yes   Sig: Take 400 mg by mouth two (2) times a day. amLODIPine (NORVASC) 10 mg tablet  Self Yes   Sig: Take 1 Tablet by mouth daily. anastrozole (ARIMIDEX) 1 mg tablet  Self Yes   Sig: Take 1 mg by mouth daily. aspirin delayed-release 81 mg tablet  Self Yes   Sig: Take 81 mg by mouth daily. bortezomib (VELCADE INJECTION) 3/29/2023 Self Yes   Sig: by Injection route. cholecalciferol (VITAMIN D3) (5000 Units/125 mcg) tab tablet  Self Yes   Sig: Take 5,000 Units by mouth daily. dexAMETHasone (DECADRON) 4 mg tablet  Self Yes   Sig: Take 40 mg by mouth every seven (7) days. empagliflozin (Jardiance) 10 mg tablet  Self Yes   Sig: Take 1 Tablet by mouth daily. furosemide (LASIX) 20 mg tablet  Self Yes   Sig: Take 1 Tablet by mouth daily. glipiZIDE (GLUCOTROL) 5 mg tablet  Self Yes   Sig: Take 1 tab by mouth 30 min prior to breakfast daily for diabetes. hydrOXYzine pamoate (VISTARIL) 50 mg capsule  Self Yes   Sig: TAKE 1 TO 2 CAPSULES BY MOUTH EVERY NIGHT 30 MINUTES BEFORE BEDTIME FOR INSOMNIA   lenalidomide 10 mg cap  Self Yes   Sig: Take  by mouth.   levothyroxine (SYNTHROID) 100 mcg tablet  Self Yes   Sig: Take 1 Tablet by mouth Daily (before breakfast). Indications: a condition with low thyroid hormone levels   omega-3 fatty acids/dha/epa (MEGARED PLANT-OMEGA-3 PO)  Self Yes   Sig: Take 800 mg by mouth daily. omeprazole (PRILOSEC) 20 mg capsule  Self Yes   Sig: Take 20 mg by mouth two (2) times a day. rosuvastatin (CRESTOR) 40 mg tablet  Self Yes   Sig: Take 1 Tablet by mouth nightly. Indications: high cholesterol and high triglycerides   venlafaxine-SR (EFFEXOR-XR) 150 mg capsule  Self Yes   Sig: Take 1 Capsule by mouth daily. vitamin E (AQUA GEMS) 400 unit capsule  Self Yes   Sig: Take 400 Units by mouth daily.       Facility-Administered Medications: None

## 2023-03-30 NOTE — H&P
's  Hospitalist History & Physical Notes. [facility] Name : Yi Miller      MRN number : [id number]     YOB: 1950     Subjective :   Chief Complaint : Shortness of breath, lower extremity edema    Source of information : Patient good historian, she is a retired medical person. Previous records, ED provider. History of present illness:   Yi Miller is  74 y.o. female with history of COPD with shortness of breath only with activity at baseline on treatment stable condition, diabetes mellitus, hypothyroidism on supplementation, hypertension, history of carcinoma breast treated, currently on adjuvant treatment with anastrozole, recently diagnosed with multiple myeloma with several bone lesions on treatment was sent from oncologist office due to significant shortness of breath noted and lower extremity edema. States that she started having shortness of breath increased for the last 3 days, wheezing, using her regular inhalers with not much improvement. Denies any fever, chills. Denies any chest pain or palpitations. Admits lower extremity weakness is recently increased, the redness is from scratching the skin when she had allergic reaction to the chemotherapy. She does have orthopnea at baseline with no change. When speaking she was very dyspneic and I can hear the wheezing. She is not on any oxygen at home, started on oxygen here with much improvement. She had an echocardiogram at cardiologist office in June 2022 with normal ejection fraction. States that she is regularly following with cardiology, denies any issues at this time.     Past Medical History:   Diagnosis Date    Arthritis     Asthma     Cancer ([address]     BREAST CANCER RIGHT BREAST LUMPECTOMY     Chronic pain     COPD (chronic obstructive pulmonary disease) (HCC)     Depressive disorder 9/22/2020    Diabetes ([address]     Essential hypertension 9/22/2020    GERD (gastroesophageal reflux disease) Hypothyroidism 9/22/2020    Malignant tumor of breast ([facility] 9/22/2020    Osteoarthritis 9/22/2020    Pure hypercholesterolemia 9/22/2020    Sleep apnea     Thyroid disease 1980    REMOVED     Type 2 diabetes mellitus without complication ([facility] 1/33/5262     Past Surgical History:   Procedure Laterality Date    HX CHOLECYSTECTOMY  2013    HX COLONOSCOPY  10/20/2017    HX COLONOSCOPY  2014    HX COLONOSCOPY  2007    HX HEENT      HX HYMENECTOMY      HX ORTHOPAEDIC Left 07/07/2020    left rotator cuff    FL MASTECTOMY PARTIAL Right 2016    FL UNLISTED PROCEDURE BREAST Right 02/2016    LUMPECTOMY      Family History   Problem Relation Age of Onset    Thyroid Disease Mother     Heart Disease Mother     Hypertension Mother     Heart Attack Mother     Cancer Father         SPINE     Other Father         COMMITTED SUICIDE     Diabetes Sister     Ovarian Cancer Sister     Diabetes Brother     Heart Disease Brother     Diabetes Brother     Heart Disease Brother     Lung Disease Brother     Diabetes Brother     Lung Cancer Brother     Anesth Problems Neg Hx       Social History     Tobacco Use    Smoking status: Every Day     Packs/day: 1.00     Years: 50.00     Pack years: 50.00     Types: Cigarettes    Smokeless tobacco: Never   Substance Use Topics    Alcohol use: Not Currently       Allergies   Allergen Reactions    Ppd Black Rubber Mix Other (comments)     Cellulitis    Erythromycin Base Unknown (comments)    Other Medication Other (comments)     TUBERCULOSIS  PT GETS CELLULITIS            Parafon Forte Dsc [Chlorzoxazone] Unknown (comments)    Ampicillin Unknown (comments) and Rash      Current Facility-Administered Medications   Medication Dose Route Frequency Provider Last Rate Last Admin    [START ON 3/30/2023] anastrozole (ARIMIDEX) tablet 1 mg  1 mg Oral DAILY MD Roddy Merchant ON 3/30/2023] aspirin delayed-release tablet 81 mg  81 mg Oral DAILY Shefali Corrales MD        [START ON 3/30/2023] cholecalciferol (VITAMIN D3) (5000 Units/125 mcg) tablet 5,000 Units  5,000 Units Oral DAILY Christine Mahajan MD        [START ON 3/30/2023] venlafaxine-SR (EFFEXOR-XR) capsule 150 mg  150 mg Oral DAILY MD Tiago Morales ON 3/30/2023] amLODIPine (NORVASC) tablet 10 mg  10 mg Oral DAILY MD Tiago Morales ON 3/30/2023] glipiZIDE (GLUCOTROL) tablet 5 mg  5 mg Oral ACB MD Tiago Morales ON 3/30/2023] levothyroxine (SYNTHROID) tablet 100 mcg  100 mcg Oral ACB Christine Mahajan MD        hydrOXYzine pamoate (VISTARIL) capsule 25 mg  25 mg Oral QHS PRN Christine Mahajan MD        HYDROcodone-acetaminophen (NORCO) 5-325 mg per tablet 1 Tablet  1 Tablet Oral QID PRN Christine Mahajan MD        acyclovir (ZOVIRAX) tablet 400 mg  400 mg Oral BID MD Tiago Morales ON 3/30/2023] omeprazole (PRILOSEC) capsule 20 mg  20 mg Oral ACB Christine Mahajan MD        .PHARMACY TO SUBSTITUTE PER PROTOCOL (Reordered from: SITagliptin phosphate (JANUVIA) 100 mg tablet)    Per Protocol Christine Mahajan MD        budesonide-formoteroL (SYMBICORT) 160-4.5 mcg/actuation HFA inhaler 2 Puff  2 Puff Inhalation BID RT Christine Mahajan MD        And    [START ON 3/30/2023] tiotropium bromide (SPIRIVA RESPIMAT) 2.5 mcg /actuation  2 Puff Inhalation DAILY Christine Mahajan MD        .PHARMACY TO SUBSTITUTE PER PROTOCOL (Reordered from: empagliflozin (Jardiance) 10 mg tablet)    Per Protocol Christine Mahajan MD        insulin lispro (HUMALOG) injection   SubCUTAneous AC&HS Christine Mahajan MD        glucose chewable tablet 16 g  4 Tablet Oral PRN Christine Mahajan MD        glucagon (GLUCAGEN) injection 1 mg  1 mg IntraMUSCular PRN Christine Mahajan MD        dextrose 10% infusion 0-250 mL  0-250 mL IntraVENous PRN Christine Mahajan MD        sodium chloride (NS) flush 5-40 mL  5-40 mL IntraVENous Q8H Cristin Solorio MD        sodium chloride (NS) flush 5-40 mL  5-40 mL IntraVENous PRN Cristin Solorio MD        albuterol (PROVENTIL VENTOLIN) nebulizer solution 2.5 mg  2.5 mg Nebulization Q4H PRN Cristin Solorio MD        methylPREDNISolone (PF) (Solu-MEDROL) injection 37.5 mg  37.5 mg IntraVENous Q8H Cirstin Solorio MD        acetaminophen (TYLENOL) tablet 650 mg  650 mg Oral Q4H PRN Cristin oSlorio MD        ondansetron (ZOFRAN ODT) tablet 4 mg  4 mg Oral Q6H PRN Cristin Solorio MD        docusate sodium (COLACE) capsule 100 mg  100 mg Oral BID Cristin Solorio MD        enoxaparin (LOVENOX) injection 40 mg  40 mg SubCUTAneous Q24H Cristin Solorio MD         Current Outpatient Medications   Medication Sig Dispense Refill    HYDROcodone-acetaminophen (NORCO) 5-325 mg per tablet Take 1 Tablet by mouth four (4) times daily as needed for Severe Pain. acyclovir (ZOVIRAX) 400 mg tablet Take 400 mg by mouth two (2) times a day. omeprazole (PRILOSEC) 20 mg capsule Take 20 mg by mouth Daily (before breakfast). rosuvastatin (CRESTOR) 40 mg tablet Take 1 Tablet by mouth nightly. Indications: high cholesterol and high triglycerides 90 Tablet 1    hydrOXYzine pamoate (VISTARIL) 50 mg capsule TAKE 1 TO 2 CAPSULES BY MOUTH EVERY NIGHT 30 MINUTES BEFORE BEDTIME FOR INSOMNIA      bortezomib (VELCADE INJECTION) by Injection route. lenalidomide (Revlimid) 10 mg cap Take  by mouth. SITagliptin phosphate (JANUVIA) 100 mg tablet Take 1 Tablet by mouth daily. Stop jan[employer]-we are stopping metformin altogether 90 Tablet 3    glipiZIDE (GLUCOTROL) 5 mg tablet Take 1 tab by mouth 30 min prior to breakfast daily for diabetes. 90 Tablet 1    levothyroxine (SYNTHROID) 100 mcg tablet Take 1 Tablet by mouth Daily (before breakfast).  Indications: a condition with low thyroid hormone levels 90 Tablet 1    empagliflozin (Jardiance) 10 mg tablet Take 1 Tablet by mouth daily. 90 Tablet 3    venlafaxine-SR (EFFEXOR-XR) 150 mg capsule Take 1 Capsule by mouth daily. 90 Capsule 1    amLODIPine (NORVASC) 10 mg tablet Take 1 Tablet by mouth daily. 90 Tablet 1    furosemide (LASIX) 20 mg tablet Take 1 Tablet by mouth daily. 90 Tablet 1    Trelegy Ellipta 100-62.5-25 mcg inhaler INHALE 1 PUFF BY MOUTH EVERY DAY      anastrozole (ARIMIDEX) 1 mg tablet Take 1 mg by mouth daily. aspirin delayed-release 81 mg tablet Take 81 mg by mouth daily. omega-3 fatty acids/dha/epa (MEGARED PLANT-OMEGA-3 PO) Take 800 mg by mouth daily. vitamin E (AQUA GEMS) 400 unit capsule Take 400 Units by mouth daily. cholecalciferol (VITAMIN D3) (5000 Units/125 mcg) tab tablet Take 5,000 Units by mouth daily. Review of Systems:    Comprehensive review of the systems done, she denies any other complaints other than in history of present illness. Vitals:   Patient Vitals for the past 12 hrs:   Temp Pulse Resp BP SpO2   03/29/23 2113 -- -- -- -- 97 %   03/29/23 2046 -- 90 -- 123/80 --   03/29/23 1816 -- -- -- -- 95 %   03/29/23 1732 98 °F (36.7 °C) 95 28 (!) 162/74 94 %       Physical Exam:   General : Tired, mild respiratory distress, states she is feeling better. HEENT : PERRLA, dry oral mucosa, atraumatic normocephalic, Normal ear and nose. Neck : Supple, no JVD, no masses noted, no carotid bruit. Lungs : Breath sounds with decreased air movement bilaterally, air movement, diffuse wheezing noted. States since on oxygen breathing better, not using accessory muscles. CVS : Rhythm rate regular, difficult to hear heart sounds due to diffuse wheezing. Abdomen : Soft, nontender,  bowel sounds active. Extremities : 3+ edema bilateral   Musculoskeletal : Fair range of motion, no joint swelling or effusion, muscle tone and power appears normal.   Skin : Dry, warm. Lymphatic : No cervical lymphadenopathy.   Neurological : Awake, alert, oriented to time place person. Psychiatric : Mood and affect appears appropriate to the situation. Data Review:   Recent Results (from the past 24 hour(s))   CBC WITH AUTOMATED DIFF    Collection Time: 03/29/23  5:56 PM   Result Value Ref Range    WBC 10.9 3.6 - 11.0 K/uL    RBC 4.06 3.80 - 5.20 M/uL    HGB 11.4 (L) 11.5 - 16.0 g/dL    HCT 35.2 35.0 - 47.0 %    MCV 86.7 80.0 - 99.0 FL    MCH 28.1 26.0 - 34.0 PG    MCHC 32.4 30.0 - 36.5 g/dL    RDW 15.7 (H) 11.5 - 14.5 %    PLATELET 660 (H) 445 - 400 K/uL    MPV 10.7 8.9 - 12.9 FL    NRBC 0.0 0.0  WBC    ABSOLUTE NRBC 0.00 0.00 - 0.01 K/uL    NEUTROPHILS 75 32 - 75 %    LYMPHOCYTES 13 12 - 49 %    MONOCYTES 9 5 - 13 %    EOSINOPHILS 2 0 - 7 %    BASOPHILS 1 0 - 1 %    IMMATURE GRANULOCYTES 0 0 - 0.5 %    ABS. NEUTROPHILS 8.1 (H) 1.8 - 8.0 K/UL    ABS. LYMPHOCYTES 1.4 0.8 - 3.5 K/UL    ABS. MONOCYTES 1.0 0.0 - 1.0 K/UL    ABS. EOSINOPHILS 0.3 0.0 - 0.4 K/UL    ABS. BASOPHILS 0.1 0.0 - 0.1 K/UL    ABS. IMM. GRANS. 0.0 0.00 - 0.04 K/UL    DF AUTOMATED     METABOLIC PANEL, COMPREHENSIVE    Collection Time: 03/29/23  5:56 PM   Result Value Ref Range    Sodium 139 136 - 145 mmol/L    Potassium 4.0 3.5 - 5.1 mmol/L    Chloride 110 (H) 97 - 108 mmol/L    CO2 22 21 - 32 mmol/L    Anion gap 7 5 - 15 mmol/L    Glucose 96 65 - 100 mg/dL    BUN 18 6 - 20 mg/dL    Creatinine 1.61 (H) 0.55 - 1.02 mg/dL    BUN/Creatinine ratio 11 (L) 12 - 20      eGFR 33 (L) >60 ml/min/1.73m2    Calcium 8.2 (L) 8.5 - 10.1 mg/dL    Bilirubin, total 0.3 0.2 - 1.0 mg/dL    AST (SGOT) 12 (L) 15 - 37 U/L    ALT (SGPT) 15 12 - 78 U/L    Alk.  phosphatase 118 (H) 45 - 117 U/L    Protein, total 6.9 6.4 - 8.2 g/dL    Albumin 2.9 (L) 3.5 - 5.0 g/dL    Globulin 4.0 2.0 - 4.0 g/dL    A-G Ratio 0.7 (L) 1.1 - 2.2     LACTIC ACID    Collection Time: 03/29/23  5:56 PM   Result Value Ref Range    Lactic acid 1.4 0.4 - 2.0 mmol/L   NT-PRO BNP    Collection Time: 03/29/23  5:56 PM   Result Value Ref Range    NT pro-BNP 2,359 (H) <125 pg/mL   TROPONIN-HIGH SENSITIVITY    Collection Time: 03/29/23  5:56 PM   Result Value Ref Range    Troponin-High Sensitivity 8 0 - 51 ng/L       Radiologic Studies :       CXR Results  (Last 48 hours)                 03/29/23 1810  XR CHEST SNGL V Final result    Impression:  Small left pleural effusion with bilateral pulmonary infiltrates. Narrative:  EXAM: XR CHEST SNGL V       INDICATION: wheezing, crackles, 87% edema       COMPARISON: 5/10/2018. FINDINGS: A single view of the chest demonstrates a small left pleural effusion. Bilateral upper lobe and left lower lobe pulmonary infiltrates are noted. The   cardiac and mediastinal contours and pulmonary vascularity are normal. Old left   clavicle fracture is evident. Assessment and Plan :     Acute respiratory failure with hypoxia: Due to COPD exacerbation but cannot rule out heart failure. But on my clinical evaluation I felt this is mostly from COPD rather than heart failure. Exacerbation of COPD: We will keep her on DuoNeb 4 times a day, albuterol as needed, started on IV steroids we will follow-up see how she does with it. Hypothyroidism: On supplementation we will continue and check a TSH to make sure it is compensated    Benign essential hypertension: On amlodipine which we will continue and monitor closely    Diabetes mellitus type 2: On glipizide twice a day and Sitagliptin,  I will continue with the addition of sliding scale insulin as steroids can make blood sugars uncontrolled. Multiple myeloma on chemotherapy    History of carcinoma breast on adjuvant therapy which we will continue    Due to the suspicion of the heart failure I wanted to order an echocardiogram, but patient stated that she had all the work-up done and has no doubt.   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  So I respected her decision and did not order    Admitted to medical telemetry, full CODE STATUS, home medications reviewed and verified with patient. Has no advance medical directives. CC : Marcella Bennett DO  Signed By: Hiram Maldonado MD     March 29, 2023      This dictation was done by dragon, computer voice recognition software. Often unanticipated grammatical, syntax, [place] phones and other interpretive errors are inadvertently transcribed. Please excuse errors that have escaped final proofreading.

## 2023-03-31 ENCOUNTER — APPOINTMENT (OUTPATIENT)
Dept: ULTRASOUND IMAGING | Age: 73
DRG: 190 | End: 2023-03-31
Attending: INTERNAL MEDICINE
Payer: MEDICARE

## 2023-03-31 ENCOUNTER — APPOINTMENT (OUTPATIENT)
Dept: GENERAL RADIOLOGY | Age: 73
DRG: 190 | End: 2023-03-31
Attending: INTERNAL MEDICINE
Payer: MEDICARE

## 2023-03-31 LAB
ACC. NO. FROM MICRO ORDER, ACCP: ABNORMAL
ACINETOBACTER CALCOACETICUS-BAUMANII COMPLEX, ACBCX: NOT DETECTED
ALBUMIN SERPL-MCNC: 2.4 G/DL (ref 3.5–5)
ANION GAP SERPL CALC-SCNC: 7 MMOL/L (ref 5–15)
ARTERIAL PATENCY WRIST A: YES
BACTEROIDES FRAGILIS, BFRA: NOT DETECTED
BASE DEFICIT BLDA-SCNC: 4.3 MMOL/L
BASOPHILS # BLD: 0 K/UL (ref 0–0.1)
BASOPHILS NFR BLD: 0 % (ref 0–1)
BDY SITE: ABNORMAL
BIOFIRE COMMENT, BCIDPF: ABNORMAL
BODY TEMPERATURE: 97.9
BUN SERPL-MCNC: 26 MG/DL (ref 6–20)
BUN/CREAT SERPL: 13 (ref 12–20)
C GLABRATA DNA VAG QL NAA+PROBE: NOT DETECTED
CA-I BLD-MCNC: 7.6 MG/DL (ref 8.5–10.1)
CANDIDA ALBICANS: NOT DETECTED
CANDIDA AURIS, CAAU: NOT DETECTED
CANDIDA KRUSEI, CKRP: NOT DETECTED
CANDIDA PARAPSILOSIS, CPAUP: NOT DETECTED
CANDIDA TROPICALIS, CTROP: NOT DETECTED
CHLORIDE SERPL-SCNC: 107 MMOL/L (ref 97–108)
CHLORIDE UR-SCNC: 55 MMOL/L
CO2 SERPL-SCNC: 21 MMOL/L (ref 21–32)
COHGB MFR BLD: 0.3 % (ref 1–2)
CREAT SERPL-MCNC: 1.98 MG/DL (ref 0.55–1.02)
CREAT UR-MCNC: 47 MG/DL
CRYPTO NEOFORMANS/GATTII, CRYNEG: NOT DETECTED
DIFFERENTIAL METHOD BLD: ABNORMAL
ENTEROBACTER CLOACAE COMPLEX, ECCP: NOT DETECTED
ENTEROBACTERALES SP. , ENBLS: NOT DETECTED
ENTEROCOCCUS FAECALIS, ENFA: NOT DETECTED
ENTEROCOCCUS FAECIUM, ENFAM: NOT DETECTED
EOSINOPHIL # BLD: 0 K/UL (ref 0–0.4)
EOSINOPHIL NFR BLD: 0 % (ref 0–7)
ERYTHROCYTE [DISTWIDTH] IN BLOOD BY AUTOMATED COUNT: 15.6 % (ref 11.5–14.5)
ESCHERICHIA COLI: NOT DETECTED
FIO2 ON VENT: 28 %
GAS FLOW.O2 O2 DELIVERY SYS: 2 L/MIN
GLUCOSE BLD STRIP.AUTO-MCNC: 213 MG/DL (ref 65–100)
GLUCOSE BLD STRIP.AUTO-MCNC: 232 MG/DL (ref 65–100)
GLUCOSE BLD STRIP.AUTO-MCNC: 260 MG/DL (ref 65–100)
GLUCOSE BLD STRIP.AUTO-MCNC: 316 MG/DL (ref 65–100)
GLUCOSE SERPL-MCNC: 290 MG/DL (ref 65–100)
HAEMOPHILUS INFLUENZAE, HMI: NOT DETECTED
HCO3 BLDA-SCNC: 21 MMOL/L (ref 22–26)
HCT VFR BLD AUTO: 30.2 % (ref 35–47)
HGB BLD-MCNC: 9.9 G/DL (ref 11.5–16)
IMM GRANULOCYTES # BLD AUTO: 0 K/UL
IMM GRANULOCYTES NFR BLD AUTO: 0 %
KLEBSIELLA AEROGENES, KLAE: NOT DETECTED
KLEBSIELLA OXYTOCA: NOT DETECTED
KLEBSIELLA PNEUMONIAE GROUP, KPPG: NOT DETECTED
LISTERIA MONOCYTOGENES, LMONP: NOT DETECTED
LYMPHOCYTES # BLD: 0.6 K/UL (ref 0.8–3.5)
LYMPHOCYTES NFR BLD: 6 % (ref 12–49)
MAGNESIUM SERPL-MCNC: 1.9 MG/DL (ref 1.6–2.4)
MCH RBC QN AUTO: 28 PG (ref 26–34)
MCHC RBC AUTO-ENTMCNC: 32.8 G/DL (ref 30–36.5)
MCV RBC AUTO: 85.3 FL (ref 80–99)
MECA/C (METHICILLIN RESISTANT GENE), MECACP: DETECTED
METHGB MFR BLD: 0.5 % (ref 0–1.4)
MONOCYTES # BLD: 0.3 K/UL (ref 0–1)
MONOCYTES NFR BLD: 3 % (ref 5–13)
NEISSERIA MENINGITIDIS, NMNI: NOT DETECTED
NEUTS SEG # BLD: 8.6 K/UL (ref 1.8–8)
NEUTS SEG NFR BLD: 91 % (ref 32–75)
NRBC # BLD: 0 K/UL (ref 0–0.01)
NRBC BLD-RTO: 0 PER 100 WBC
OXYHGB MFR BLD: 90.7 % (ref 95–99)
PCO2 BLDA: 38 MMHG (ref 35–45)
PERFORMED BY, TECHID: ABNORMAL
PH BLDA: 7.35 (ref 7.35–7.45)
PHOSPHATE SERPL-MCNC: 3.4 MG/DL (ref 2.6–4.7)
PLATELET # BLD AUTO: 467 K/UL (ref 150–400)
PLATELET COMMENTS,PCOM: ABNORMAL
PMV BLD AUTO: 10.9 FL (ref 8.9–12.9)
PO2 BLDA: 65 MMHG (ref 80–100)
POTASSIUM SERPL-SCNC: 3.8 MMOL/L (ref 3.5–5.1)
POTASSIUM UR-SCNC: 28 MMOL/L
PROT UR-MCNC: 13 MG/DL (ref 0–11.9)
PROTEUS, PRP: NOT DETECTED
PSEUDOMONAS AERUGINOSA: NOT DETECTED
RBC # BLD AUTO: 3.54 M/UL (ref 3.8–5.2)
RBC MORPH BLD: ABNORMAL
RESISTANT GENE SPACE, REGENE: ABNORMAL
SALMONELLA, SALMO: NOT DETECTED
SAO2 % BLD: 91 % (ref 95–99)
SAO2% DEVICE SAO2% SENSOR NAME: ABNORMAL
SERRATIA MARCESCENS: NOT DETECTED
SODIUM SERPL-SCNC: 135 MMOL/L (ref 136–145)
SODIUM UR-SCNC: 30 MMOL/L
SPECIMEN SITE: ABNORMAL
STAPH EPIDERMIDIS, STEP: DETECTED
STAPH LUGDUNENSIS, STALUG: NOT DETECTED
STAPHYLOCOCCUS AUREUS: NOT DETECTED
STAPHYLOCOCCUS, STAPP: DETECTED
STENO MALTOPHILIA, STMA: NOT DETECTED
STREPTOCOCCUS , STPSP: NOT DETECTED
STREPTOCOCCUS AGALACTIAE (GROUP B): NOT DETECTED
STREPTOCOCCUS PNEUMONIAE , SPNP: NOT DETECTED
STREPTOCOCCUS PYOGENES (GROUP A), SPYOP: NOT DETECTED
WBC # BLD AUTO: 9.5 K/UL (ref 3.6–11)

## 2023-03-31 PROCEDURE — 85025 COMPLETE CBC W/AUTO DIFF WBC: CPT

## 2023-03-31 PROCEDURE — 74011000258 HC RX REV CODE- 258: Performed by: HOSPITALIST

## 2023-03-31 PROCEDURE — 74011250637 HC RX REV CODE- 250/637: Performed by: HOSPITALIST

## 2023-03-31 PROCEDURE — 77010033678 HC OXYGEN DAILY

## 2023-03-31 PROCEDURE — 94640 AIRWAY INHALATION TREATMENT: CPT

## 2023-03-31 PROCEDURE — 82436 ASSAY OF URINE CHLORIDE: CPT

## 2023-03-31 PROCEDURE — 71045 X-RAY EXAM CHEST 1 VIEW: CPT

## 2023-03-31 PROCEDURE — 84156 ASSAY OF PROTEIN URINE: CPT

## 2023-03-31 PROCEDURE — 80069 RENAL FUNCTION PANEL: CPT

## 2023-03-31 PROCEDURE — 36600 WITHDRAWAL OF ARTERIAL BLOOD: CPT

## 2023-03-31 PROCEDURE — 82570 ASSAY OF URINE CREATININE: CPT

## 2023-03-31 PROCEDURE — 84300 ASSAY OF URINE SODIUM: CPT

## 2023-03-31 PROCEDURE — 74011250636 HC RX REV CODE- 250/636: Performed by: NURSE PRACTITIONER

## 2023-03-31 PROCEDURE — 82803 BLOOD GASES ANY COMBINATION: CPT

## 2023-03-31 PROCEDURE — 74011250637 HC RX REV CODE- 250/637: Performed by: NURSE PRACTITIONER

## 2023-03-31 PROCEDURE — 74011000250 HC RX REV CODE- 250: Performed by: HOSPITALIST

## 2023-03-31 PROCEDURE — 84133 ASSAY OF URINE POTASSIUM: CPT

## 2023-03-31 PROCEDURE — 74011636637 HC RX REV CODE- 636/637: Performed by: HOSPITALIST

## 2023-03-31 PROCEDURE — 74011250636 HC RX REV CODE- 250/636: Performed by: HOSPITALIST

## 2023-03-31 PROCEDURE — 74011000250 HC RX REV CODE- 250: Performed by: INTERNAL MEDICINE

## 2023-03-31 PROCEDURE — 83735 ASSAY OF MAGNESIUM: CPT

## 2023-03-31 PROCEDURE — 82962 GLUCOSE BLOOD TEST: CPT

## 2023-03-31 PROCEDURE — 76770 US EXAM ABDO BACK WALL COMP: CPT

## 2023-03-31 PROCEDURE — 36415 COLL VENOUS BLD VENIPUNCTURE: CPT

## 2023-03-31 PROCEDURE — 65610000006 HC RM INTENSIVE CARE

## 2023-03-31 RX ORDER — FUROSEMIDE 40 MG/1
40 TABLET ORAL DAILY
Status: DISCONTINUED | OUTPATIENT
Start: 2023-04-01 | End: 2023-04-03 | Stop reason: HOSPADM

## 2023-03-31 RX ADMIN — IPRATROPIUM BROMIDE AND ALBUTEROL SULFATE 3 ML: 2.5; .5 SOLUTION RESPIRATORY (INHALATION) at 13:47

## 2023-03-31 RX ADMIN — ENOXAPARIN SODIUM 100 MG: 100 INJECTION SUBCUTANEOUS at 08:50

## 2023-03-31 RX ADMIN — LEVOTHYROXINE SODIUM 100 MCG: 0.1 TABLET ORAL at 08:27

## 2023-03-31 RX ADMIN — METOPROLOL TARTRATE 25 MG: 25 TABLET, FILM COATED ORAL at 21:00

## 2023-03-31 RX ADMIN — DILTIAZEM HYDROCHLORIDE 12.5 MG/HR: 5 INJECTION INTRAVENOUS at 06:33

## 2023-03-31 RX ADMIN — DILTIAZEM HYDROCHLORIDE 15 MG/HR: 5 INJECTION INTRAVENOUS at 03:37

## 2023-03-31 RX ADMIN — IPRATROPIUM BROMIDE AND ALBUTEROL SULFATE 3 ML: 2.5; .5 SOLUTION RESPIRATORY (INHALATION) at 07:58

## 2023-03-31 RX ADMIN — FUROSEMIDE 40 MG: 10 INJECTION, SOLUTION INTRAMUSCULAR; INTRAVENOUS at 08:27

## 2023-03-31 RX ADMIN — APIXABAN 5 MG: 5 TABLET, FILM COATED ORAL at 20:59

## 2023-03-31 RX ADMIN — INSULIN LISPRO 2 UNITS: 100 INJECTION, SOLUTION INTRAVENOUS; SUBCUTANEOUS at 21:00

## 2023-03-31 RX ADMIN — SODIUM CHLORIDE, PRESERVATIVE FREE 10 ML: 5 INJECTION INTRAVENOUS at 05:05

## 2023-03-31 RX ADMIN — DOXYCYCLINE HYCLATE 100 MG: 100 CAPSULE ORAL at 21:00

## 2023-03-31 RX ADMIN — SODIUM CHLORIDE, PRESERVATIVE FREE 10 ML: 5 INJECTION INTRAVENOUS at 13:37

## 2023-03-31 RX ADMIN — METHYLPREDNISOLONE SODIUM SUCCINATE 37.5 MG: 125 INJECTION, POWDER, FOR SOLUTION INTRAMUSCULAR; INTRAVENOUS at 05:05

## 2023-03-31 RX ADMIN — ALOGLIPTIN 12.5 MG: 12.5 TABLET, FILM COATED ORAL at 08:46

## 2023-03-31 RX ADMIN — DOCUSATE SODIUM 100 MG: 100 CAPSULE, LIQUID FILLED ORAL at 20:59

## 2023-03-31 RX ADMIN — ANASTROZOLE 1 MG: 1 TABLET ORAL at 08:46

## 2023-03-31 RX ADMIN — INSULIN LISPRO 7 UNITS: 100 INJECTION, SOLUTION INTRAVENOUS; SUBCUTANEOUS at 13:35

## 2023-03-31 RX ADMIN — INSULIN LISPRO 10 UNITS: 100 INJECTION, SOLUTION INTRAVENOUS; SUBCUTANEOUS at 08:28

## 2023-03-31 RX ADMIN — BUDESONIDE AND FORMOTEROL FUMARATE DIHYDRATE 2 PUFF: 160; 4.5 AEROSOL RESPIRATORY (INHALATION) at 07:58

## 2023-03-31 RX ADMIN — CHOLECALCIFEROL TAB 125 MCG (5000 UNIT) 5000 UNITS: 125 TAB at 08:27

## 2023-03-31 RX ADMIN — IPRATROPIUM BROMIDE AND ALBUTEROL SULFATE 3 ML: 2.5; .5 SOLUTION RESPIRATORY (INHALATION) at 21:12

## 2023-03-31 RX ADMIN — ACYCLOVIR 400 MG: 200 CAPSULE ORAL at 08:27

## 2023-03-31 RX ADMIN — GLIPIZIDE 5 MG: 5 TABLET ORAL at 08:27

## 2023-03-31 RX ADMIN — DOCUSATE SODIUM 100 MG: 100 CAPSULE, LIQUID FILLED ORAL at 08:27

## 2023-03-31 RX ADMIN — BUDESONIDE AND FORMOTEROL FUMARATE DIHYDRATE 2 PUFF: 160; 4.5 AEROSOL RESPIRATORY (INHALATION) at 21:19

## 2023-03-31 RX ADMIN — ACYCLOVIR 400 MG: 200 CAPSULE ORAL at 20:59

## 2023-03-31 RX ADMIN — ASPIRIN 81 MG: 81 TABLET, COATED ORAL at 08:27

## 2023-03-31 RX ADMIN — PANTOPRAZOLE SODIUM 40 MG: 40 TABLET, DELAYED RELEASE ORAL at 08:27

## 2023-03-31 RX ADMIN — VENLAFAXINE HYDROCHLORIDE 150 MG: 75 CAPSULE, EXTENDED RELEASE ORAL at 08:27

## 2023-03-31 RX ADMIN — SODIUM CHLORIDE, PRESERVATIVE FREE 10 ML: 5 INJECTION INTRAVENOUS at 21:14

## 2023-03-31 RX ADMIN — INSULIN LISPRO 4 UNITS: 100 INJECTION, SOLUTION INTRAVENOUS; SUBCUTANEOUS at 17:52

## 2023-03-31 RX ADMIN — DOXYCYCLINE HYCLATE 100 MG: 100 CAPSULE ORAL at 08:27

## 2023-03-31 NOTE — CONSULTS
NEPHROLOGY CONSULT NOTE     Patient: Andrea Barajas MRN: [id number]  PCP: Yuri Keene DO   :     1950  Age:   74 y.o. Sex:  female      Referring physician: Kirk Jimenez MD    Reason for consultation:     Acute kidney injury. Ms. Carmen Huang was seen and examined in the intensive care unit at [facility] this morning. She was in room 282. Admission Date: 3/29/2023  5:38 PM  LOS: 2 days     DISCUSSION / PLAN :   Mr. Carolina Ruano has acute kidney injury of unclear etiology. The following factors could be contributory:    Heart failure/fluid overload-the presentation seem consistent with decompensated heart failure. There is a history of fluid retention, dyspnea on exertion and an elevated NT proBNP level of 6724. Decompensated heart failure can cause hypoperfusion and acute kidney injury. ATN-this is possible. Her blood pressure was as low as 86/66 yesterday. ATN could be explained on the basis of ischemic injury. Acute interstitial nephritis-this is possible. This seems less likely at this time however. Myeloma kidney-this is possible. Ms. Cassandra Wang was diagnosed with plasma cell myeloma. However, she is on a regimen consisting of Revlimid and Velcade. Nonetheless myeloma kidney is possible. Obstructive uropathy-seems less likely at this time. Nonetheless it should be ruled out. Recommendations:    Renal ultrasound. Urine electrolytes. Urine protein creatinine ratio. Agree with cautious diuresis. The patient has felt better since the initiation of diuretic therapy. Hold ACE inhibitors, ARB's and renin antagonist at this time. Avoid NSAIDs and IV contrast agents. Daily weights. Follow-up electrolytes daily. Check the PTH screen for secondary hyperparathyroidism. Active Problems / Assessment AAActive  : Active Problems:    COPD with acute exacerbation ([address] (3/29/2023)    Acute kidney injury. Myeloma. COPD.  KARIS.   Diabetes mellitus. GERD. Fluid overload. Subjective:     Shortness of breath. HPI:    Ms. Asiya Day is a 12-year-old lady with a history of COPD, asthma, diabetes mellitus, KARIS, hyperlipidemia, hypertension and plasma cell myeloma. She is followed by Dr. Floridalma Green of the [facility] [place] team.  Patient has been initiated on a regimen of Revlimid and Velcade. Last several weeks she reported a skin rash with Revlimid therapy. It seems it is on hold. Dyspnea has worsened over the last several days. There has been a marked decrease in her exercise tolerance and increasing lower extremity edema. She has been sleeping in a recliner for some time. There is no clear-cut history of angina, fever, chills, dysuria, or hematuria. Her appetite has been a bit depressed. She reported losing approximately 8 pounds over last several weeks. She reported postprandial vomiting. There is no suggestion of lower gastrointestinal bleeding. She was evaluated in the ER on 3/29. A-fib with RVR was noted. Her blood pressure was as low as 86/66. The NT proBNP level was elevated at 6724. There was evidence of fluid overload. Diuresis was initiated. There is no prior known history of kidney disease. There is no family history of renal disease. Review of the database in [facility] showed the following:    Yadira 3, 2020-BUN 15, creatinine 1. 15. August 15, 2022-BUN 11 creatinine 1.21. March 29, 2023-BUN 18, creatinine 1.61    March 30, 2023-BUN 17, creatinine 1.50. March 31, 2023-BUN 26, creatinine 1.98. Patient has has not seen a Nephrologist for a formal evaluation. She has felt better since the initiation of diuresis.           Past Medical Hx:   Past Medical History:   Diagnosis Date    Arthritis     Asthma     Cancer ([facility] [address]     BREAST CANCER RIGHT BREAST LUMPECTOMY     Chronic pain     COPD (chronic obstructive pulmonary disease) ([facility] [address]     Depressive disorder 9/22/2020    Diabetes ([facility] [address] Essential hypertension 9/22/2020    GERD (gastroesophageal reflux disease)     Hypothyroidism 9/22/2020    Malignant tumor of breast ([facility] 9/22/2020    Osteoarthritis 9/22/2020    Pure hypercholesterolemia 9/22/2020    Sleep apnea     Thyroid disease 1980    REMOVED     Type 2 diabetes mellitus without complication ([facility] 4/35/1869        Past Surgical Hx:     Past Surgical History:   Procedure Laterality Date    HX CHOLECYSTECTOMY  2013    HX COLONOSCOPY  10/20/2017    HX COLONOSCOPY  2014    HX COLONOSCOPY  2007    HX HEENT      HX HYMENECTOMY      HX ORTHOPAEDIC Left 07/07/2020    left rotator cuff    IA MASTECTOMY PARTIAL Right 2016    IA UNLISTED PROCEDURE BREAST Right 02/2016    LUMPECTOMY        Medications:  Prior to Admission medications    Medication Sig Start Date End Date Taking? Authorizing Provider   dexAMETHasone (DECADRON) 4 mg tablet Take 40 mg by mouth every seven (7) days. Yes Provider, Historical   SITagliptin phosphate (JANUVIA) 100 mg tablet Take 100 mg by mouth daily. Yes Provider, Historical   HYDROcodone-acetaminophen (NORCO) 5-325 mg per tablet Take 1 Tablet by mouth four (4) times daily as needed for Severe Pain. 3/6/23  Yes Provider, Historical   acyclovir (ZOVIRAX) 400 mg tablet Take 400 mg by mouth two (2) times a day. 3/29/23  Yes Provider, Historical   omeprazole (PRILOSEC) 20 mg capsule Take 20 mg by mouth two (2) times a day. 3/29/23  Yes Provider, Historical   rosuvastatin (CRESTOR) 40 mg tablet Take 1 Tablet by mouth nightly. Indications: high cholesterol and high triglycerides 3/22/23  Yes Analia Hines NP   hydrOXYzine pamoate (VISTARIL) 50 mg capsule TAKE 1 TO 2 CAPSULES BY MOUTH EVERY NIGHT 30 MINUTES BEFORE BEDTIME FOR INSOMNIA 12/2/22  Yes Provider, Historical   bortezomib (VELCADE INJECTION) by Injection route. Yes Provider, Historical   lenalidomide 10 mg cap Take  by mouth.    Yes Provider, Historical   glipiZIDE (GLUCOTROL) 5 mg tablet Take 1 tab by mouth 30 min prior to breakfast daily for diabetes. 2/3/23  Yes Reuben Escalera,    levothyroxine (SYNTHROID) 100 mcg tablet Take 1 Tablet by mouth Daily (before breakfast). Indications: a condition with low thyroid hormone levels 2/3/23  Yes Reuben Escalera, DO   empagliflozin (Jardiance) 10 mg tablet Take 1 Tablet by mouth daily. 11/22/22  Yes Candie Darnell, DO   venlafaxine-SR [facility] 150 mg capsule Take 1 Capsule by mouth daily. 11/22/22  Yes Reuben Escalera, DO   amLODIPine (NORVASC) 10 mg tablet Take 1 Tablet by mouth daily. 11/22/22  Yes Reuben Escalera, DO   furosemide (LASIX) 20 mg tablet Take 1 Tablet by mouth daily. 11/22/22  Yes Reuben Escalera DO   Trelegy Ellipta 100-62.5-25 mcg inhaler Take 1 Puff by inhalation daily. 10/28/21  Yes Provider, Historical   anastrozole (ARIMIDEX) 1 mg tablet Take 1 mg by mouth daily. Yes Provider, Historical   aspirin delayed-release 81 mg tablet Take 81 mg by mouth daily. Yes Provider, Historical   omega-3 fatty acids/dha/epa (MEGARED PLANT-OMEGA-3 PO) Take 800 mg by mouth daily. Yes Provider, Historical   vitamin E (AQUA GEMS) 400 unit capsule Take 400 Units by mouth daily. Yes Provider, Historical   cholecalciferol (VITAMIN D3) (5000 Units/125 mcg) tab tablet Take 5,000 Units by mouth daily. Yes Provider, Historical       Allergies   Allergen Reactions    Ppd Black Rubber Mix Other (comments)     Cellulitis    Erythromycin Base Unknown (comments)    Other Medication Other (comments)     TUBERCULOSIS  PT GETS CELLULITIS            Parafon Forte Dsc [Chlorzoxazone] Unknown (comments)    Ampicillin Unknown (comments) and Rash       Social Hx:     Ms. Dolly Rudolph has smokes cigarettes since age 21. At her peak, she had a half pack of cigarettes per day. She is Retired. 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       Family History   Problem Relation Age of Onset    Thyroid Disease Mother     Heart Disease Mother     Hypertension Mother     Heart Attack Mother     Cancer Father         SPINE     Other Father         COMMITTED SUICIDE     Diabetes Sister     Ovarian Cancer Sister     Diabetes Brother     Heart Disease Brother     Diabetes Brother     Heart Disease Brother     Lung Disease Brother     Diabetes Brother     Lung Cancer Brother     Anesth Problems Neg Hx        Review of Systems:    See HPI. Objective:    Vitals:    Vitals:    03/31/23 0800 03/31/23 0801 03/31/23 0900 03/31/23 1000   BP: 97/72  108/62 (!) 107/56   Pulse: 79  66 66   Resp: 25  18 20   Temp:       SpO2: 96% (!) 78% 98% 97%   Weight:       Height:         I&O's:  03/30 0701 - 03/31 0700  In: -   Out: 450 [Urine:450]  Visit Vitals  BP (!) 107/56   Pulse 66   Temp 97.9 °F (36.6 °C)   Resp 20   Ht 5' 4\" (1.626 m)   Wt 103.6 kg (228 lb 6.3 oz)   SpO2 97%   BMI 39.20 kg/m²       Physical Exam:      Seen in the intensive care unit of [address]. A member of her care team was present during my visit. General: Older lady lying in bed quite comfortably. Mucous membranes: Moist and anicteric. Cardiovascular: S1, S2, no S3 gallop, no pericardial rub. Rest examination: Decreased air entry in lower zones, no wheezes, no rales. Abdomen: Not distended. Lower extremities: Mild pretibial edema. Neuro: Alert answer question appropriate. Psych: Appropriate affect.       Laboratory Results:    Lab Results   Component Value Date    BUN 26 (H) 03/31/2023     (L) 03/31/2023    K 3.8 03/31/2023     03/31/2023    CO2 21 03/31/2023       Lab Results   Component Value Date    BUN 26 (H) 03/31/2023    BUN 17 03/30/2023    BUN 18 03/29/2023    BUN 11 08/15/2022    BUN 15 06/01/2022    K 3.8 03/31/2023    K 4.2 03/30/2023    K 4.0 03/29/2023    K 4.8 08/15/2022    K 4.4 06/01/2022       Lab Results   Component Value Date    WBC 9.5 03/31/2023    RBC 3.54 (L) 03/31/2023    HGB 9.9 (L) 03/31/2023    HCT 30.2 (L) 03/31/2023    MCV 85.3 03/31/2023    MCH 28.0 03/31/2023    RDW 15.6 (H) 03/31/2023     (H) 03/31/2023       Lab Results   Component Value Date    PHOS 3.4 03/31/2023       Urine dipstick:   Lab Results   Component Value Date/Time    Color Yellow/Straw 03/30/2023 03:19 AM    Appearance Clear 03/30/2023 03:19 AM    Specific gravity 1.005 03/30/2023 03:19 AM    pH (UA) 5.0 03/30/2023 03:19 AM    Protein Negative 03/30/2023 03:19 AM    Glucose >300 (A) 03/30/2023 03:19 AM    Ketone 5 (A) 03/30/2023 03:19 AM    Bilirubin Negative 03/30/2023 03:19 AM    Urobilinogen 0.1 03/30/2023 03:19 AM    Nitrites Negative 03/30/2023 03:19 AM    Leukocyte Esterase Negative 03/30/2023 03:19 AM    Epithelial cells FEW 06/23/2020 02:34 PM    Bacteria 1+ (A) 03/30/2023 03:19 AM    Bacteria 1+ (A) 03/30/2023 03:19 AM    WBC 0-4 03/30/2023 03:19 AM    WBC 0-4 03/30/2023 03:19 AM    RBC 0-5 03/30/2023 03:19 AM    RBC 0-5 03/30/2023 03:19 AM       I have reviewed the following:         Care Plan discussed with: Ms Radha Wood reviewed. Thank you for allowing us to participate in the care of this patient. We will follow patient. Please dont hesitate to call with any questions    Roya Denson MD  3/31/2023    Davian Brownleeeye  Phone - [phone]   Fax - [phone]  www. [email or web address]

## 2023-03-31 NOTE — PROGRESS NOTES
CARDIOLOGY PROGRESS NOTE      Patient Name: Ravinder Carrasquillo  Age: 74 y.o. Gender:female  :1950  MRN: [id number]    Patient seen and examined. This is a patient who is followed for new onset atrial fibrillation. Resting in bed, feeling much better. Some shortness of breath continues, leg swelling improving. No other complaints reported. Telemetry reviewed, converted back to sinus rhythm    Pertinent review of systems items noted above, all other systems are negative. Current medications reviewed. Physical Examination  Allergies   Allergen Reactions    Ppd Black Rubber Mix Other (comments)     Cellulitis    Erythromycin Base Unknown (comments)    Other Medication Other (comments)     TUBERCULOSIS  PT GETS CELLULITIS            Parafon Forte Dsc [Chlorzoxazone] Unknown (comments)    Ampicillin Unknown (comments) and Rash     Visit Vitals  /65   Pulse 68   Temp 98.3 °F (36.8 °C)   Resp 15   Ht 5' 4\" (1.626 m)   Wt 103.6 kg (228 lb 6.3 oz)   SpO2 95%   BMI 39.20 kg/m²     Vital signs are stable. No apparent distress. Heart has a regular rate and rhythm. Normal S1, S2, soft murmur  Lungs are diminished bilaterally. Abdomen is soft, nontender, normal bowel sounds. Extremities have mild edema. Skin is dry and warm  Normal affect    Labs reviewed: Lab results reviewed. For significant abnormal values and values requiring intervention, see assessment and plan.   Recent Results (from the past 12 hour(s))   CBC WITH AUTOMATED DIFF    Collection Time: 23  3:39 AM   Result Value Ref Range    WBC 9.5 3.6 - 11.0 K/uL    RBC 3.54 (L) 3.80 - 5.20 M/uL    HGB 9.9 (L) 11.5 - 16.0 g/dL    HCT 30.2 (L) 35.0 - 47.0 %    MCV 85.3 80.0 - 99.0 FL    MCH 28.0 26.0 - 34.0 PG    MCHC 32.8 30.0 - 36.5 g/dL    RDW 15.6 (H) 11.5 - 14.5 %    PLATELET 405 (H) 305 - 400 K/uL    MPV 10.9 8.9 - 12.9 FL    NRBC 0.0 0.0  WBC    ABSOLUTE NRBC 0.00 0.00 - 0.01 K/uL    NEUTROPHILS 91 (H) 32 - 75 %    LYMPHOCYTES 6 (L) 12 - 49 %    MONOCYTES 3 (L) 5 - 13 %    EOSINOPHILS 0 0 - 7 %    BASOPHILS 0 0 - 1 %    IMMATURE GRANULOCYTES 0 %    ABS. NEUTROPHILS 8.6 (H) 1.8 - 8.0 K/UL    ABS. LYMPHOCYTES 0.6 (L) 0.8 - 3.5 K/UL    ABS. MONOCYTES 0.3 0.0 - 1.0 K/UL    ABS. EOSINOPHILS 0.0 0.0 - 0.4 K/UL    ABS. BASOPHILS 0.0 0.0 - 0.1 K/UL    ABS. IMM.  GRANS. 0.0 K/UL    DF Manual      PLATELET COMMENTS Large Platelets      RBC COMMENTS Anisocytosis  1+       RENAL FUNCTION PANEL    Collection Time: 03/31/23  3:39 AM   Result Value Ref Range    Sodium 135 (L) 136 - 145 mmol/L    Potassium 3.8 3.5 - 5.1 mmol/L    Chloride 107 97 - 108 mmol/L    CO2 21 21 - 32 mmol/L    Anion gap 7 5 - 15 mmol/L    Glucose 290 (H) 65 - 100 mg/dL    BUN 26 (H) 6 - 20 mg/dL    Creatinine 1.98 (H) 0.55 - 1.02 mg/dL    BUN/Creatinine ratio 13 12 - 20      eGFR 26 (L) >60 ml/min/1.73m2    Calcium 7.6 (L) 8.5 - 10.1 mg/dL    Phosphorus 3.4 2.6 - 4.7 mg/dL    Albumin 2.4 (L) 3.5 - 5.0 g/dL   MAGNESIUM    Collection Time: 03/31/23  3:39 AM   Result Value Ref Range    Magnesium 1.9 1.6 - 2.4 mg/dL   BLOOD GAS, ARTERIAL    Collection Time: 03/31/23  3:56 AM   Result Value Ref Range    pH 7.35 7.35 - 7.45      PCO2 38 35 - 45 mmHg    PO2 65 (L) 80 - 100 mmHg    O2 SATURATION 91 (L) 95 - 99 %    BICARBONATE 21 (L) 22 - 26 mmol/L    BASE DEFICIT 4.3 mmol/L    O2 METHOD Nasal Cannula      O2 FLOW RATE 2.00 L/min    FIO2 28 %    Sample source Arterial      SITE Right Radial      LOPEZ'S TEST YES      Carboxy-Hgb 0.3 (L) 1 - 2 %    Methemoglobin 0.5 0 - 1.4 %    Oxyhemoglobin 90.7 (L) 95 - 99 %    Performed by Bronson Gaspar     TEMPERATURE 97.9     GLUCOSE, POC    Collection Time: 03/31/23  7:30 AM   Result Value Ref Range    Glucose (POC) 316 (H) 65 - 100 mg/dL    Performed by Lissett Birmingham    GLUCOSE, POC    Collection Time: 03/31/23 11:30 AM   Result Value Ref Range    Glucose (POC) 260 (H) 65 - 100 mg/dL    Performed by Lissett Birmingham         Case discussed with Dr. Georgette Allison and our impression and recommendations are as follows:  Atrial fibrillation, new onset rate elevated on admission  Currently in SR, stop dilt gtt  Agree with lopressor  Change therapeutic lovenox to eliquis  Shortness of breath, likely multifactorial given COPD and AF RVR  Back to sinus rhythm  Echo with preserved EF, mild MR  BP soft, will change to po lasix, strict I&O and weights  Venous duplexes negative for DVT  Followed by Dr. Ramirez Headings for COPD  Nonobstructive CAD per CCTA, no chest pain reported, continue medical management with ASA  Multiple myeloma, hematology following appreciate input  Tobacco abuse, needs to quit    Please do not hesitate to call me or Dr. River Antony if additional questions arise.     Henri Dominguez, NP  3/31/2023

## 2023-03-31 NOTE — PROGRESS NOTES
Hospitalist Progress Note    NAME: Destin Lackey   :  1950   MRN:  [id number]     Subjective:   Daily Progress Note: 3/31/2023 1:55 PM    Chief complaint: Worsening shortness of breath    3/30/2023:  Patient seen and examined in ED, chart was reviewed. Spoke with  at bedside. Patient progressively getting short of breath for last several days  She was using 's home oxygen. She is noted to have A-fib with RVR on Cardizem drip,   cardiology on case recommending anticoagulation if okay with hematology  Patient continues to have intermittent palpitations and dyspnea on exertion. Patient being transferred to ICU level of care at this time. Bilateral lower extremity Dopplers noted to be negative.  -----------------    3/31/23: Feels much better. Breathing better. No pain, n/v. Converted to Sinus. Still on Cardizem gtt --> titrating down. Echo with good EF.         Current Facility-Administered Medications   Medication Dose Route Frequency    metoprolol tartrate (LOPRESSOR) tablet 25 mg  25 mg Oral Q12H    dilTIAZem (CARDIZEM) 125 mg in 0.9% sodium chloride 125 mL ([id number])  0-15 mg/hr IntraVENous TITRATE    furosemide (LASIX) injection 40 mg  40 mg IntraVENous BID    doxycycline (VIBRAMYCIN) capsule 100 mg  100 mg Oral Q12H    albuterol-ipratropium (DUO-NEB) 2.5 MG-0.5 MG/3 ML  3 mL Nebulization Q2H PRN    acyclovir (ZOVIRAX) capsule 400 mg  400 mg Oral BID    enoxaparin (LOVENOX) injection 100 mg  100 mg SubCUTAneous BID    albuterol-ipratropium (DUO-NEB) 2.5 MG-0.5 MG/3 ML  3 mL Nebulization TID RT    anastrozole (ARIMIDEX) tablet 1 mg  1 mg Oral DAILY    aspirin delayed-release tablet 81 mg  81 mg Oral DAILY    cholecalciferol (VITAMIN D3) (5000 Units/125 mcg) tablet 5,000 Units  5,000 Units Oral DAILY    venlafaxine-SR (EFFEXOR-XR) capsule 150 mg  150 mg Oral DAILY    [Held by provider] amLODIPine (NORVASC) tablet 10 mg  10 mg Oral DAILY    glipiZIDE (GLUCOTROL) tablet 5 mg  5 mg Oral ACB    levothyroxine (SYNTHROID) tablet 100 mcg  100 mcg Oral ACB    hydrOXYzine pamoate (VISTARIL) capsule 25 mg  25 mg Oral QHS PRN    HYDROcodone-acetaminophen (NORCO) 5-325 mg per tablet 1 Tablet  1 Tablet Oral QID PRN    pantoprazole (PROTONIX) tablet 40 mg  40 mg Oral ACB    alogliptin (NESINA) tablet 12.5 mg  12.5 mg Oral DAILY    budesonide-formoteroL (SYMBICORT) 160-4.5 mcg/actuation HFA inhaler 2 Puff  2 Puff Inhalation BID RT    And    [Held by provider] tiotropium bromide (SPIRIVA RESPIMAT) 2.5 mcg /actuation  2 Puff Inhalation DAILY    insulin lispro (HUMALOG) injection   SubCUTAneous AC&HS    glucose chewable tablet 16 g  4 Tablet Oral PRN    glucagon (GLUCAGEN) injection 1 mg  1 mg IntraMUSCular PRN    dextrose 10% infusion 0-250 mL  0-250 mL IntraVENous PRN    sodium chloride (NS) flush 5-40 mL  5-40 mL IntraVENous Q8H    sodium chloride (NS) flush 5-40 mL  5-40 mL IntraVENous PRN    albuterol CONCENTRATE 2.5mg/0.5 mL neb soln  2.5 mg Nebulization Q4H PRN    methylPREDNISolone (PF) (Solu-MEDROL) injection 37.5 mg  37.5 mg IntraVENous Q8H    acetaminophen (TYLENOL) tablet 650 mg  650 mg Oral Q4H PRN    ondansetron (ZOFRAN ODT) tablet 4 mg  4 mg Oral Q6H PRN    docusate sodium (COLACE) capsule 100 mg  100 mg Oral BID          Objective:     Visit Vitals  BP 97/72   Pulse 79   Temp 97.9 °F (36.6 °C)   Resp 25   Ht 5' 4\" (1.626 m)   Wt 103.6 kg (228 lb 6.3 oz)   SpO2 (!) 78%   BMI 39.20 kg/m²    O2 Flow Rate (L/min): 2 l/min O2 Device: Nasal cannula    Temp (24hrs), Av.9 °F (36.6 °C), Min:97.5 °F (36.4 °C), Max:98.1 °F (36.7 °C)        PHYSICAL EXAM:  General: AxOx3. NAD.  Conversant  Neck: Supple  CVS: S1S2; A fib/RVR --> sinus now, 60-70s on Cardizem gtt  Respiratory: Poor bilat breath sounds but no wheezing or ronchi  Abdomen: Soft, NT, BS+  Extremities: No significant edema  Neuro: No focal motor deficits  Psych: Appropriate affect    Data Review    Recent Results (from the past 24 hour(s)) EKG, 12 LEAD, INITIAL    Collection Time: 03/29/23  5:36 PM   Result Value Ref Range    Ventricular Rate 87 BPM    Atrial Rate 87 BPM    P-R Interval 164 ms    QRS Duration 76 ms    Q-T Interval 368 ms    QTC Calculation (Bezet) 442 ms    Calculated P Axis 59 degrees    Calculated R Axis 85 degrees    Calculated T Axis 50 degrees    Diagnosis       Normal sinus rhythm  Low voltage QRS  Septal infarct , age undetermined  Abnormal ECG    Confirmed by [facility] COLEMANArlene BELCHER ([id number]) on 3/30/2023 12:58:25 PM     CBC WITH AUTOMATED DIFF    Collection Time: 03/29/23  5:56 PM   Result Value Ref Range    WBC 10.9 3.6 - 11.0 K/uL    RBC 4.06 3.80 - 5.20 M/uL    HGB 11.4 (L) 11.5 - 16.0 g/dL    HCT 35.2 35.0 - 47.0 %    MCV 86.7 80.0 - 99.0 FL    MCH 28.1 26.0 - 34.0 PG    MCHC 32.4 30.0 - 36.5 g/dL    RDW 15.7 (H) 11.5 - 14.5 %    PLATELET 101 (H) 869 - 400 K/uL    MPV 10.7 8.9 - 12.9 FL    NRBC 0.0 0.0  WBC    ABSOLUTE NRBC 0.00 0.00 - 0.01 K/uL    NEUTROPHILS 75 32 - 75 %    LYMPHOCYTES 13 12 - 49 %    MONOCYTES 9 5 - 13 %    EOSINOPHILS 2 0 - 7 %    BASOPHILS 1 0 - 1 %    IMMATURE GRANULOCYTES 0 0 - 0.5 %    ABS. NEUTROPHILS 8.1 (H) 1.8 - 8.0 K/UL    ABS. LYMPHOCYTES 1.4 0.8 - 3.5 K/UL    ABS. MONOCYTES 1.0 0.0 - 1.0 K/UL    ABS. EOSINOPHILS 0.3 0.0 - 0.4 K/UL    ABS. BASOPHILS 0.1 0.0 - 0.1 K/UL    ABS. IMM. GRANS. 0.0 0.00 - 0.04 K/UL    DF AUTOMATED     METABOLIC PANEL, COMPREHENSIVE    Collection Time: 03/29/23  5:56 PM   Result Value Ref Range    Sodium 139 136 - 145 mmol/L    Potassium 4.0 3.5 - 5.1 mmol/L    Chloride 110 (H) 97 - 108 mmol/L    CO2 22 21 - 32 mmol/L    Anion gap 7 5 - 15 mmol/L    Glucose 96 65 - 100 mg/dL    BUN 18 6 - 20 mg/dL    Creatinine 1.61 (H) 0.55 - 1.02 mg/dL    BUN/Creatinine ratio 11 (L) 12 - 20      eGFR 33 (L) >60 ml/min/1.73m2    Calcium 8.2 (L) 8.5 - 10.1 mg/dL    Bilirubin, total 0.3 0.2 - 1.0 mg/dL    AST (SGOT) 12 (L) 15 - 37 U/L    ALT (SGPT) 15 12 - 78 U/L    Alk.  phosphatase 118 (H) 45 - 117 U/L    Protein, total 6.9 6.4 - 8.2 g/dL    Albumin 2.9 (L) 3.5 - 5.0 g/dL    Globulin 4.0 2.0 - 4.0 g/dL    A-G Ratio 0.7 (L) 1.1 - 2.2     LACTIC ACID    Collection Time: 03/29/23  5:56 PM   Result Value Ref Range    Lactic acid 1.4 0.4 - 2.0 mmol/L   CULTURE, BLOOD, PAIRED    Collection Time: 03/29/23  5:56 PM    Specimen: Blood   Result Value Ref Range    Special Requests: No Special Requests      Culture result: No growth after 4 hours     NT-PRO BNP    Collection Time: 03/29/23  5:56 PM   Result Value Ref Range    NT pro-BNP 2,359 (H) <125 pg/mL   TROPONIN-HIGH SENSITIVITY    Collection Time: 03/29/23  5:56 PM   Result Value Ref Range    Troponin-High Sensitivity 8 0 - 51 ng/L   GLUCOSE, POC    Collection Time: 03/29/23 10:33 PM   Result Value Ref Range    Glucose (POC) 107 (H) 65 - 100 mg/dL    Performed by Irma Davies    HEMOGLOBIN A1C WITH EAG    Collection Time: 03/30/23  3:06 AM   Result Value Ref Range    Hemoglobin A1c 8.1 (H) 4.0 - 5.6 %    Est. average glucose 186 mg/dL   RENAL FUNCTION PANEL    Collection Time: 03/30/23  3:06 AM   Result Value Ref Range    Sodium 141 136 - 145 mmol/L    Potassium 4.2 3.5 - 5.1 mmol/L    Chloride 113 (H) 97 - 108 mmol/L    CO2 18 (L) 21 - 32 mmol/L    Anion gap 10 5 - 15 mmol/L    Glucose 164 (H) 65 - 100 mg/dL    BUN 17 6 - 20 mg/dL    Creatinine 1.50 (H) 0.55 - 1.02 mg/dL    BUN/Creatinine ratio 11 (L) 12 - 20      eGFR 36 (L) >60 ml/min/1.73m2    Calcium 7.5 (L) 8.5 - 10.1 mg/dL    Phosphorus 3.2 2.6 - 4.7 mg/dL    Albumin 2.5 (L) 3.5 - 5.0 g/dL   TROPONIN-HIGH SENSITIVITY    Collection Time: 03/30/23  3:06 AM   Result Value Ref Range    Troponin-High Sensitivity 6 0 - 51 ng/L   URINALYSIS W/ RFLX MICROSCOPIC    Collection Time: 03/30/23  3:19 AM   Result Value Ref Range    Color Yellow/Straw      Appearance Clear Clear      Specific gravity 1.005 1.003 - 1.030      pH (UA) 5.0 5.0 - 8.0      Protein Negative Negative mg/dL    Glucose >300 (A) Negative mg/dL    Ketone 5 (A) Negative mg/dL    Bilirubin Negative Negative      Blood Small (A) Negative      Urobilinogen 0.1 0.1 - 1.0 EU/dL    Nitrites Negative Negative      Leukocyte Esterase Negative Negative      WBC 0-4 0 - 4 /hpf    RBC 0-5 0 - 5 /hpf    Bacteria 1+ (A) Negative /hpf   URINE MICROSCOPIC    Collection Time: 03/30/23  3:19 AM   Result Value Ref Range    WBC 0-4 0 - 4 /hpf    RBC 0-5 0 - 5 /hpf    Bacteria 1+ (A) Negative /hpf   EKG, 12 LEAD, INITIAL    Collection Time: 03/30/23  6:10 AM   Result Value Ref Range    Ventricular Rate 151 BPM    Atrial Rate 153 BPM    QRS Duration 84 ms    Q-T Interval 326 ms    QTC Calculation (Bezet) 516 ms    Calculated R Axis 25 degrees    Calculated T Axis -171 degrees    Diagnosis       Poor data quality, interpretation may be adversely affected  Atrial fibrillation with rapid ventricular response with premature   ventricular or aberrantly conducted complexes  Low voltage QRS  Cannot rule out Anteroseptal infarct (cited on or before 29-MAR-2023)  Abnormal ECG  When compared with ECG of 29-MAR-2023 17:36, (Unconfirmed)  Atrial fibrillation has replaced Sinus rhythm  Vent.  rate has increased BY  64 BPM  Questionable change in initial forces of Anterior leads  Nonspecific T wave abnormality no longer evident in Anterior leads  Confirmed by [place] [id number] ([id number]) on 3/30/2023 12:58:16 PM     TROPONIN-HIGH SENSITIVITY    Collection Time: 03/30/23  6:33 AM   Result Value Ref Range    Troponin-High Sensitivity 8 0 - 51 ng/L   GLUCOSE, POC    Collection Time: 03/30/23  8:23 AM   Result Value Ref Range    Glucose (POC) 176 (H) 65 - 100 mg/dL    Performed by [facility]    EKG, 12 LEAD, INITIAL    Collection Time: 03/30/23 10:32 AM   Result Value Ref Range    Ventricular Rate 129 BPM    Atrial Rate 138 BPM    QRS Duration 86 ms    Q-T Interval 306 ms    QTC Calculation (Bezet) 448 ms    Calculated R Axis -15 degrees    Calculated T Axis -129 degrees Diagnosis       Atrial fibrillation with rapid ventricular response with premature   ventricular or aberrantly conducted complexes  Low voltage QRS  Cannot rule out Anteroseptal infarct , age undetermined  ST & T wave abnormality, consider inferior ischemia  Abnormal ECG    Confirmed by Bartolo Sidhu ([id number]) on 3/30/2023 12:58:43 PM     GLUCOSE, POC    Collection Time: 03/30/23 11:57 AM   Result Value Ref Range    Glucose (POC) 170 (H) 65 - 100 mg/dL    Performed by Erica Calderon      No results found for this visit on 03/29/23. All Micro Results       Procedure Component Value Units Date/Time    BLOOD CULTURE ID PANEL [[id number]] Collected: 03/29/23 1756    Order Status: No result Specimen: Blood Updated: 03/31/23 0755    MRSA SCREEN - PCR (NASAL) [[id number]] Collected: 03/30/23 1500    Order Status: Completed Specimen: Swab Updated: 03/30/23 1709     MRSA by PCR, Nasal Not Detected       CULTURE, RESPIRATORY/SPUTUM/BRONCH Larnell Lunger STAIN [[id number]]     Order Status: Sent Specimen: Sputum     CULTURE, BLOOD, PAIRED [[id number]] Collected: 03/29/23 1756    Order Status: Completed Specimen: Blood Updated: 03/30/23 1219     Special Requests: No Special Requests        Culture result: No growth after 4 hours             CXR-IMPRESSION  Small left pleural effusion with bilateral pulmonary infiltrates. Assessment with MDM and DDx/Plan:     Acute hypoxic respiratory failure  Multifactorial CHF d/t A Fib/RVR + AECOPD; Questionable PNA  Chest x-ray showed bilateral infiltrates  Start empiric IV antibiotics  Dr. Luis Kee on consult  Continue oxygen support and wean as tolerated    A-fib with RVR suspected new diagnosis --> Sinus  IV Cardizem drip and titration  Oral Lopressor twice daily  [facility] following  => AC okayed by Heme/Onc  => DC aspirin? AECOPD  Continue IV steroids  Continue scheduled DuoNebs    Bacteremia?   Staph coag negative in 2/4 bottles  - Repeat Cx  - C/s ID  - Cont Doxy for now    Anemia; complex d/t hx MM/Cancer  [facility]  Stool occult    New/active Dx of Multiple myeloma-hematology consulted; follows with Dr. Terry Garzon    Hx Breast cancer-continue previous medications    DMT2-monitor BG on sliding scale    Hypothyroidism-continue home levothyroxine    CKD stage III-monitor creatinine    Advance directive-full code  DVT prophylaxis-subcu Lovenox  Next of kin-   Social determinants of health-none    Dispo-more than 48 hours pending clinical improvement and medical stability monitor closely in ICU; transfer to tele when off of cardizem and stable from cardiac standpoint    Signed By: Chi Maguire MD     March 31, 2023

## 2023-03-31 NOTE — PROGRESS NOTES
0930: Chart reviewed. Per notes; patient followed via pulmonology and hematology. Nephrology consulted. DCP assessment to be completed. CM will continue to follow patient and recs of medical team.    1050:  Reason for Admission:   COPD                  RUR Score:     17%             PCP: First and Last name:   Naye Knox DO     Name of Practice:    Are you a current patient: Yes/No: YES   Approximate date of last visit:    Can you participate in a virtual visit if needed:     Do you (patient/family) have any concerns for transition/discharge? Not at this time              Plan for utilizing home health:  Patient declined need at this time     Current Advanced Directive/Advance Care Plan:  Full Code      Healthcare Decision Maker:   Click here to complete [address] including selection of the [address] Relationship (ie \"Primary\")            Destin Whiting, Spouse [phone]    Transition of Care Plan:  CM met with patient at bedside for DCP. Demos verified as accurate. Patient lives with her spouse in a one story home: four steps in the front, three steps in the back and a ramp. Prior to admission, patient reports being independent with ADLs most of the time.  assist, as needed. DME: rollator. No home oxygen. No previous HH/IRF/SNF. Patient declined need for [facility] at this time. Medications received from [place] [id number]. When medically stable for discharge, Mr. Bud Kennedy will transport patient home. CM will continue to follow patient and recs of medical team.    Current Dispo: Home, no needs.

## 2023-03-31 NOTE — PROGRESS NOTES
[facility] bedside shift report given to Antonio Haddad and Tej Wong (Oncoming RN) by Brielle Hernandez (Off-going RN).

## 2023-03-31 NOTE — PROGRESS NOTES
IMPRESSION:   Acute hypoxic respiratory failure  A-fib with RVR  Acute exacerbation of COPD  Tobacco abuse  Acute kidney injury  Multiple myeloma with lytic lesions to the bone  Pt is critically ill.  Time spent with pt and staff actively rendering care, managing pt and coordinating care as stated below; 30 minutes, exclusive of any procedures      RECOMMENDATIONS/PLAN:   ICU monitoring  61-year-old lady came in with other shortness of breath dyspnea and she was found to be in A-fib with RVR started on IV Cardizem and also received beta-blocker  COPD better we will discontinue Solu-Medrol start patient on prednisone, arterial blood gases shows normal PCO2 on 2 L nasal cannula PO2 65  History of multiple myeloma the patient has bony lytic lesions received chemotherapy  Chest x-ray shows pulmonary edema haziness of right upper lobe left effusion and left lower lobe atelectasis  Worsening of the renal function on IV Cardizem and also on Lasix 40 every 12 hours  Will be available to assist in medical management while in the CCU pending disposition     [x] High complexity decision making was performed  [x] See my orders for details  HPI  61-year-old lady came in because of difficulty breathing along with having cough past medical history of coronary artery disease multiple myeloma received chemotherapy yesterday and she was complaining of leg swelling shortness of breath and dyspnea she also had a history of COPD was with me regularly along with coronary artery disease, she is starting having wheezing using inhalers she is not on any oxygen at home so came to the hospital got admitted to ICU     PMH:  has a past medical history of Arthritis, Asthma, Cancer ([address] Chronic pain, COPD (chronic obstructive pulmonary disease) ([address] Depressive disorder (9/22/2020), Diabetes ([address] Essential hypertension (9/22/2020), GERD (gastroesophageal reflux disease), Hypothyroidism (9/22/2020), Malignant tumor of breast ([address] (9/22/2020), Osteoarthritis (9/22/2020), Pure hypercholesterolemia (9/22/2020), Sleep apnea, Thyroid disease (1980), and Type 2 diabetes mellitus without complication ([facility] (4/02/6635). PSH:   has a past surgical history that includes hx hymenectomy; hx heent; hx colonoscopy (10/20/2017); hx colonoscopy (2014); hx colonoscopy (2007); pr unlisted procedure breast (Right, 02/2016); pr mastectomy partial (Right, 2016); hx cholecystectomy (2013); and hx orthopaedic (Left, 07/07/2020). FHX: family history includes Cancer in her father; Diabetes in her brother, brother, brother, and sister; Heart Attack in her mother; Heart Disease in her brother, brother, and mother; Hypertension in her mother; Larrie Nick in her brother; Lung Disease in her brother; Other in her father; Ovarian Cancer in her sister; Thyroid Disease in her mother. SHX:  reports that she has been smoking cigarettes. She has a 50.00 pack-year smoking history. She has never used smokeless tobacco. She reports that she does not currently use alcohol. She reports that she does not use drugs.     ALL:   Allergies   Allergen Reactions    Ppd Black Rubber Mix Other (comments)     Cellulitis    Erythromycin Base Unknown (comments)    Other Medication Other (comments)     TUBERCULOSIS  PT GETS CELLULITIS            Parafon Forte Dsc [Chlorzoxazone] Unknown (comments)    Ampicillin Unknown (comments) and Rash        MEDS:   [x] Reviewed - As Below   [] Not reviewed    Current Facility-Administered Medications   Medication    metoprolol tartrate (LOPRESSOR) tablet 25 mg    dilTIAZem (CARDIZEM) 125 mg in 0.9% sodium chloride 125 mL ([id number])    furosemide (LASIX) injection 40 mg    doxycycline (VIBRAMYCIN) capsule 100 mg    albuterol-ipratropium (DUO-NEB) 2.5 MG-0.5 MG/3 ML    acyclovir (ZOVIRAX) capsule 400 mg    enoxaparin (LOVENOX) injection 100 mg    albuterol-ipratropium (DUO-NEB) 2.5 MG-0.5 MG/3 ML    anastrozole (ARIMIDEX) tablet 1 mg    aspirin delayed-release tablet 81 mg    cholecalciferol (VITAMIN D3) (5000 Units/125 mcg) tablet 5,000 Units    venlafaxine-SR (EFFEXOR-XR) capsule 150 mg    glipiZIDE (GLUCOTROL) tablet 5 mg    levothyroxine (SYNTHROID) tablet 100 mcg    hydrOXYzine pamoate (VISTARIL) capsule 25 mg    HYDROcodone-acetaminophen (NORCO) 5-325 mg per tablet 1 Tablet    pantoprazole (PROTONIX) tablet 40 mg    alogliptin (NESINA) tablet 12.5 mg    budesonide-formoteroL (SYMBICORT) 160-4.5 mcg/actuation HFA inhaler 2 Puff    And    [Held by provider] tiotropium bromide (SPIRIVA RESPIMAT) 2.5 mcg /actuation    insulin lispro (HUMALOG) injection    glucose chewable tablet 16 g    glucagon (GLUCAGEN) injection 1 mg    dextrose 10% infusion 0-250 mL    sodium chloride (NS) flush 5-40 mL    sodium chloride (NS) flush 5-40 mL    albuterol CONCENTRATE 2.5mg/0.5 mL neb soln    methylPREDNISolone (PF) (Solu-MEDROL) injection 37.5 mg    acetaminophen (TYLENOL) tablet 650 mg    ondansetron (ZOFRAN ODT) tablet 4 mg    docusate sodium (COLACE) capsule 100 mg      MAR reviewed and pertinent medications noted or modified as needed   Current Facility-Administered Medications   Medication    metoprolol tartrate (LOPRESSOR) tablet 25 mg    dilTIAZem (CARDIZEM) 125 mg in 0.9% sodium chloride 125 mL ([id number])    furosemide (LASIX) injection 40 mg    doxycycline (VIBRAMYCIN) capsule 100 mg    albuterol-ipratropium (DUO-NEB) 2.5 MG-0.5 MG/3 ML    acyclovir (ZOVIRAX) capsule 400 mg    enoxaparin (LOVENOX) injection 100 mg    albuterol-ipratropium (DUO-NEB) 2.5 MG-0.5 MG/3 ML    anastrozole (ARIMIDEX) tablet 1 mg    aspirin delayed-release tablet 81 mg    cholecalciferol (VITAMIN D3) (5000 Units/125 mcg) tablet 5,000 Units    venlafaxine-SR (EFFEXOR-XR) capsule 150 mg    glipiZIDE (GLUCOTROL) tablet 5 mg    levothyroxine (SYNTHROID) tablet 100 mcg    hydrOXYzine pamoate (VISTARIL) capsule 25 mg    HYDROcodone-acetaminophen (NORCO) 5-325 mg per tablet 1 Tablet pantoprazole (PROTONIX) tablet 40 mg    alogliptin (NESINA) tablet 12.5 mg    budesonide-formoteroL (SYMBICORT) 160-4.5 mcg/actuation HFA inhaler 2 Puff    And    [Held by provider] tiotropium bromide (SPIRIVA RESPIMAT) 2.5 mcg /actuation    insulin lispro (HUMALOG) injection    glucose chewable tablet 16 g    glucagon (GLUCAGEN) injection 1 mg    dextrose 10% infusion 0-250 mL    sodium chloride (NS) flush 5-40 mL    sodium chloride (NS) flush 5-40 mL    albuterol CONCENTRATE 2.5mg/0.5 mL neb soln    methylPREDNISolone (PF) (Solu-MEDROL) injection 37.5 mg    acetaminophen (TYLENOL) tablet 650 mg    ondansetron (ZOFRAN ODT) tablet 4 mg    docusate sodium (COLACE) capsule 100 mg      PMH:  has a past medical history of Arthritis, Asthma, Cancer ([facility] Chronic pain, COPD (chronic obstructive pulmonary disease) ([facility] Depressive disorder (9/22/2020), Diabetes ([facility] Essential hypertension (9/22/2020), GERD (gastroesophageal reflux disease), Hypothyroidism (9/22/2020), Malignant tumor of breast ([facility] (9/22/2020), Osteoarthritis (9/22/2020), Pure hypercholesterolemia (9/22/2020), Sleep apnea, Thyroid disease (1980), and Type 2 diabetes mellitus without complication ([facility] (1/32/6109). PSH:   has a past surgical history that includes hx hymenectomy; hx heent; hx colonoscopy (10/20/2017); hx colonoscopy (2014); hx colonoscopy (2007); pr unlisted procedure breast (Right, 02/2016); pr mastectomy partial (Right, 2016); hx cholecystectomy (2013); and hx orthopaedic (Left, 07/07/2020). FHX: family history includes Cancer in her father; Diabetes in her brother, brother, brother, and sister; Heart Attack in her mother; Heart Disease in her brother, brother, and mother; Hypertension in her mother; Loman Number in her brother; Lung Disease in her brother; Other in her father; Ovarian Cancer in her sister; Thyroid Disease in her mother. SHX:  reports that she has been smoking cigarettes. She has a 50.00 pack-year smoking history. She has never used smokeless tobacco. She reports that she does not currently use alcohol. She reports that she does not use drugs. ROS:A comprehensive review of systems was negative except for that written in the HPI. Hemodynamics:    CO:    CI:    CVP:    SVR:   PAP Systolic:    PAP Diastolic:    PVR:    NO39:        Ventilator Settings:      Mode Rate TV Press PEEP FiO2 PIP Min. Vent               28 %              Vital Signs: Telemetry:    AFIB Intake/Output:   Visit Vitals  /62   Pulse 66   Temp 97.9 °F (36.6 °C)   Resp 18   Ht 5' 4\" (1.626 m)   Wt 103.6 kg (228 lb 6.3 oz)   SpO2 98%   BMI 39.20 kg/m²       Temp (24hrs), Av.9 °F (36.6 °C), Min:97.5 °F (36.4 °C), Max:98.1 °F (36.7 °C)        O2 Device: Nasal cannula O2 Flow Rate (L/min): 2 l/min       Wt Readings from Last 4 Encounters:   23 103.6 kg (228 lb 6.3 oz)   23 105.2 kg (232 lb)   22 103.9 kg (229 lb)   22 101.6 kg (224 lb)          Intake/Output Summary (Last 24 hours) at 3/31/2023 0910  Last data filed at 3/31/2023 0703  Gross per 24 hour   Intake 169.85 ml   Output 450 ml   Net -280.15 ml       Last shift:       07 - 1900  In: 169.9 [I.V.:169.9]  Out: -   Last 3 shifts: 1901 -  0700  In: -   Out: 450 [Urine:450]       Physical Exam:     General:  female; on oxygen 2 L nasal cannula  HEENT: NCAT, poor dentition, lips and mucosa dry  Eyes: anicteric; conjunctiva clear  Neck: no nodes,  trach midline; no accessory MM use. Chest: no deformity,   Cardiac: IR regular; no murmur;   Lungs: distant breath sounds; bilateral wheezes  Abd: soft, NT, hypoactive BS  Ext: no edema; no joint swelling;  No clubbing  : NO payne, clear urine  Neuro: Alert awake  Psych- no agitation, oriented to person;   Skin: warm, dry, no cyanosis;   Pulses: 1-2+ Bilateral pedal, radial  Capillary: brisk; pale      DATA:    MAR reviewed and pertinent medications noted or modified as needed  MEDS: Current Facility-Administered Medications   Medication    metoprolol tartrate (LOPRESSOR) tablet 25 mg    dilTIAZem (CARDIZEM) 125 mg in 0.9% sodium chloride 125 mL ([id number])    furosemide (LASIX) injection 40 mg    doxycycline (VIBRAMYCIN) capsule 100 mg    albuterol-ipratropium (DUO-NEB) 2.5 MG-0.5 MG/3 ML    acyclovir (ZOVIRAX) capsule 400 mg    enoxaparin (LOVENOX) injection 100 mg    albuterol-ipratropium (DUO-NEB) 2.5 MG-0.5 MG/3 ML    anastrozole (ARIMIDEX) tablet 1 mg    aspirin delayed-release tablet 81 mg    cholecalciferol (VITAMIN D3) (5000 Units/125 mcg) tablet 5,000 Units    venlafaxine-SR (EFFEXOR-XR) capsule 150 mg    glipiZIDE (GLUCOTROL) tablet 5 mg    levothyroxine (SYNTHROID) tablet 100 mcg    hydrOXYzine pamoate (VISTARIL) capsule 25 mg    HYDROcodone-acetaminophen (NORCO) 5-325 mg per tablet 1 Tablet    pantoprazole (PROTONIX) tablet 40 mg    alogliptin (NESINA) tablet 12.5 mg    budesonide-formoteroL (SYMBICORT) 160-4.5 mcg/actuation HFA inhaler 2 Puff    And    [Held by provider] tiotropium bromide (SPIRIVA RESPIMAT) 2.5 mcg /actuation    insulin lispro (HUMALOG) injection    glucose chewable tablet 16 g    glucagon (GLUCAGEN) injection 1 mg    dextrose 10% infusion 0-250 mL    sodium chloride (NS) flush 5-40 mL    sodium chloride (NS) flush 5-40 mL    albuterol CONCENTRATE 2.5mg/0.5 mL neb soln    methylPREDNISolone (PF) (Solu-MEDROL) injection 37.5 mg    acetaminophen (TYLENOL) tablet 650 mg    ondansetron (ZOFRAN ODT) tablet 4 mg    docusate sodium (COLACE) capsule 100 mg        Labs:    Recent Labs     03/31/23  0339 03/29/23  1756   WBC 9.5 10.9   HGB 9.9* 11.4*   * 570*       Recent Labs     03/31/23  0339 03/30/23  0306 03/29/23  1756   * 141 139   K 3.8 4.2 4.0    113* 110*   CO2 21 18* 22   * 164* 96   BUN 26* 17 18   CREA 1.98* 1.50* 1.61*   CA 7.6* 7.5* 8.2*   MG 1.9  --   --    PHOS 3.4 3.2  --    LAC  --   --  1.4   ALB 2.4* 2.5* 2.9*   ALT  --   -- 15       Recent Labs     03/31/23  0356   PH 7.35   PCO2 38   PO2 65*   HCO3 21*   FIO2 28     No results for input(s): CPK, CKNDX, TROIQ in the last 72 hours. No lab exists for component: CPKMB  No results found for: BNPP, BNP   Lab Results   Component Value Date/Time    Culture result: No growth 1 day 03/29/2023 05:56 PM    Culture result: MRSA NOT PRESENT 06/23/2020 02:14 PM    Culture result:  06/23/2020 02:14 PM         Screening of patient nares for MRSA is for surveillance purposes and, if positive, to facilitate isolation considerations in high risk settings. It is not intended for automatic decolonization interventions per se as regimens are not sufficiently effective to warrant routine use. Lab Results   Component Value Date/Time    TSH 3.360 06/01/2022 11:58 AM        Imaging:    Results from Hospital Encounter encounter on 03/29/23    XR CHEST PORT    Narrative  EXAM:  XR CHEST PORT    INDICATION: CHF    COMPARISON: 3/29/2023    TECHNIQUE: Semiupright portable chest AP view    FINDINGS: Cardiac monitoring leads. Cardiomegaly. Mild pulmonary edema with some  increase. Left pleural effusion left lower lobe volume loss without significant change. Questionable nodular opacity in the right upper lobe    Impression  Increased pulmonary edema. Questionable nodular opacity in the right upper lobe. Left effusion and left lower lobe volume loss unchanged      Results from [place] encounter on 06/09/22    CT CHEST WO CONT    Narrative  CT dose reduction was achieved through use of a standardized protocol tailored  for this examination and automatic exposure control for dose modulation. Noncontrast study is a follow-up from one year ago and shows mild emphysema and  subpleural fibrosis with some geographic aeration differences, matching findings  of previous. No evidence of nodule, edema or consolidation. Central airways are  open. No mediastinal or hilar adenopathy.  Normal caliber aorta. No pleural pericardial  effusion. No significant upper abdominal or chest wall finding    Impression  No change.  No active findings      3/31 she is on 2 L nasal cannula Sleepy this a.m. worsening of renal function we will get nephrology consult chest x-ray shows CHF pulmonary edema A-fib with RVR on Cardizem discontinue Solu-Medrol start patient on prednisone

## 2023-04-01 ENCOUNTER — APPOINTMENT (OUTPATIENT)
Dept: GENERAL RADIOLOGY | Age: 73
DRG: 190 | End: 2023-04-01
Attending: INTERNAL MEDICINE
Payer: MEDICARE

## 2023-04-01 LAB
ALBUMIN SERPL-MCNC: 2.6 G/DL (ref 3.5–5)
ALBUMIN/GLOB SERPL: 0.7 (ref 1.1–2.2)
ALP SERPL-CCNC: 88 U/L (ref 45–117)
ALT SERPL-CCNC: 12 U/L (ref 12–78)
ANION GAP SERPL CALC-SCNC: 5 MMOL/L (ref 5–15)
AST SERPL W P-5'-P-CCNC: 5 U/L (ref 15–37)
BASOPHILS # BLD: 0 K/UL (ref 0–0.1)
BASOPHILS NFR BLD: 0 % (ref 0–1)
BILIRUB SERPL-MCNC: 0.2 MG/DL (ref 0.2–1)
BUN SERPL-MCNC: 23 MG/DL (ref 6–20)
BUN/CREAT SERPL: 13 (ref 12–20)
CA-I BLD-MCNC: 7.6 MG/DL (ref 8.5–10.1)
CA-I BLD-MCNC: 7.6 MG/DL (ref 8.5–10.1)
CHLORIDE SERPL-SCNC: 108 MMOL/L (ref 97–108)
CO2 SERPL-SCNC: 25 MMOL/L (ref 21–32)
CREAT SERPL-MCNC: 1.79 MG/DL (ref 0.55–1.02)
CRP SERPL-MCNC: 1.7 MG/DL (ref 0–0.6)
DIFFERENTIAL METHOD BLD: ABNORMAL
EOSINOPHIL # BLD: 0 K/UL (ref 0–0.4)
EOSINOPHIL NFR BLD: 0 % (ref 0–7)
ERYTHROCYTE [DISTWIDTH] IN BLOOD BY AUTOMATED COUNT: 15.8 % (ref 11.5–14.5)
GLOBULIN SER CALC-MCNC: 3.7 G/DL (ref 2–4)
GLUCOSE BLD STRIP.AUTO-MCNC: 131 MG/DL (ref 65–100)
GLUCOSE BLD STRIP.AUTO-MCNC: 146 MG/DL (ref 65–100)
GLUCOSE BLD STRIP.AUTO-MCNC: 71 MG/DL (ref 65–100)
GLUCOSE BLD STRIP.AUTO-MCNC: 84 MG/DL (ref 65–100)
GLUCOSE BLD STRIP.AUTO-MCNC: 92 MG/DL (ref 65–100)
GLUCOSE SERPL-MCNC: 191 MG/DL (ref 65–100)
HCT VFR BLD AUTO: 29.4 % (ref 35–47)
HGB BLD-MCNC: 9.5 G/DL (ref 11.5–16)
IMM GRANULOCYTES # BLD AUTO: 0.1 K/UL (ref 0–0.04)
IMM GRANULOCYTES NFR BLD AUTO: 1 % (ref 0–0.5)
LYMPHOCYTES # BLD: 0.9 K/UL (ref 0.8–3.5)
LYMPHOCYTES NFR BLD: 7 % (ref 12–49)
MAGNESIUM SERPL-MCNC: 2 MG/DL (ref 1.6–2.4)
MCH RBC QN AUTO: 27.5 PG (ref 26–34)
MCHC RBC AUTO-ENTMCNC: 32.3 G/DL (ref 30–36.5)
MCV RBC AUTO: 85 FL (ref 80–99)
MONOCYTES # BLD: 0.9 K/UL (ref 0–1)
MONOCYTES NFR BLD: 7 % (ref 5–13)
NEUTS SEG # BLD: 10.7 K/UL (ref 1.8–8)
NEUTS SEG NFR BLD: 85 % (ref 32–75)
NRBC # BLD: 0 K/UL (ref 0–0.01)
NRBC BLD-RTO: 0 PER 100 WBC
PERFORMED BY, TECHID: ABNORMAL
PERFORMED BY, TECHID: ABNORMAL
PERFORMED BY, TECHID: NORMAL
PLATELET # BLD AUTO: 402 K/UL (ref 150–400)
PMV BLD AUTO: 11.1 FL (ref 8.9–12.9)
POTASSIUM SERPL-SCNC: 3.5 MMOL/L (ref 3.5–5.1)
PROCALCITONIN SERPL-MCNC: <0.05 NG/ML
PROT SERPL-MCNC: 6.3 G/DL (ref 6.4–8.2)
PTH-INTACT SERPL-MCNC: >2000 PG/ML (ref 18.4–88)
RBC # BLD AUTO: 3.46 M/UL (ref 3.8–5.2)
SODIUM SERPL-SCNC: 138 MMOL/L (ref 136–145)
WBC # BLD AUTO: 12.6 K/UL (ref 3.6–11)

## 2023-04-01 PROCEDURE — 94640 AIRWAY INHALATION TREATMENT: CPT

## 2023-04-01 PROCEDURE — 84145 PROCALCITONIN (PCT): CPT

## 2023-04-01 PROCEDURE — 82962 GLUCOSE BLOOD TEST: CPT

## 2023-04-01 PROCEDURE — 83970 ASSAY OF PARATHORMONE: CPT

## 2023-04-01 PROCEDURE — 74011636637 HC RX REV CODE- 636/637: Performed by: HOSPITALIST

## 2023-04-01 PROCEDURE — 83735 ASSAY OF MAGNESIUM: CPT

## 2023-04-01 PROCEDURE — 85025 COMPLETE CBC W/AUTO DIFF WBC: CPT

## 2023-04-01 PROCEDURE — 77010033678 HC OXYGEN DAILY

## 2023-04-01 PROCEDURE — 86140 C-REACTIVE PROTEIN: CPT

## 2023-04-01 PROCEDURE — 80053 COMPREHEN METABOLIC PANEL: CPT

## 2023-04-01 PROCEDURE — 74011250637 HC RX REV CODE- 250/637: Performed by: NURSE PRACTITIONER

## 2023-04-01 PROCEDURE — 74011250637 HC RX REV CODE- 250/637: Performed by: HOSPITALIST

## 2023-04-01 PROCEDURE — 74011000250 HC RX REV CODE- 250: Performed by: HOSPITALIST

## 2023-04-01 PROCEDURE — 74011000250 HC RX REV CODE- 250: Performed by: INTERNAL MEDICINE

## 2023-04-01 PROCEDURE — 71045 X-RAY EXAM CHEST 1 VIEW: CPT

## 2023-04-01 PROCEDURE — 87040 BLOOD CULTURE FOR BACTERIA: CPT

## 2023-04-01 PROCEDURE — 36415 COLL VENOUS BLD VENIPUNCTURE: CPT

## 2023-04-01 PROCEDURE — 65270000029 HC RM PRIVATE

## 2023-04-01 RX ADMIN — IPRATROPIUM BROMIDE AND ALBUTEROL SULFATE 3 ML: 2.5; .5 SOLUTION RESPIRATORY (INHALATION) at 13:11

## 2023-04-01 RX ADMIN — ACYCLOVIR 400 MG: 200 CAPSULE ORAL at 21:14

## 2023-04-01 RX ADMIN — GLIPIZIDE 5 MG: 5 TABLET ORAL at 08:46

## 2023-04-01 RX ADMIN — ASPIRIN 81 MG: 81 TABLET, COATED ORAL at 08:45

## 2023-04-01 RX ADMIN — DOXYCYCLINE HYCLATE 100 MG: 100 CAPSULE ORAL at 09:00

## 2023-04-01 RX ADMIN — PANTOPRAZOLE SODIUM 40 MG: 40 TABLET, DELAYED RELEASE ORAL at 08:45

## 2023-04-01 RX ADMIN — SODIUM CHLORIDE, PRESERVATIVE FREE 10 ML: 5 INJECTION INTRAVENOUS at 06:00

## 2023-04-01 RX ADMIN — INSULIN LISPRO 3 UNITS: 100 INJECTION, SOLUTION INTRAVENOUS; SUBCUTANEOUS at 08:45

## 2023-04-01 RX ADMIN — BUDESONIDE AND FORMOTEROL FUMARATE DIHYDRATE 2 PUFF: 160; 4.5 AEROSOL RESPIRATORY (INHALATION) at 20:36

## 2023-04-01 RX ADMIN — ACYCLOVIR 400 MG: 200 CAPSULE ORAL at 08:45

## 2023-04-01 RX ADMIN — VENLAFAXINE HYDROCHLORIDE 150 MG: 75 CAPSULE, EXTENDED RELEASE ORAL at 08:45

## 2023-04-01 RX ADMIN — HYDROCODONE BITARTRATE AND ACETAMINOPHEN 1 TABLET: 5; 325 TABLET ORAL at 06:38

## 2023-04-01 RX ADMIN — HYDROCODONE BITARTRATE AND ACETAMINOPHEN 1 TABLET: 5; 325 TABLET ORAL at 21:21

## 2023-04-01 RX ADMIN — DOCUSATE SODIUM 100 MG: 100 CAPSULE, LIQUID FILLED ORAL at 21:14

## 2023-04-01 RX ADMIN — METOPROLOL TARTRATE 25 MG: 25 TABLET, FILM COATED ORAL at 21:14

## 2023-04-01 RX ADMIN — FUROSEMIDE 40 MG: 40 TABLET ORAL at 08:45

## 2023-04-01 RX ADMIN — SODIUM CHLORIDE, PRESERVATIVE FREE 10 ML: 5 INJECTION INTRAVENOUS at 21:17

## 2023-04-01 RX ADMIN — LEVOTHYROXINE SODIUM 100 MCG: 0.1 TABLET ORAL at 08:45

## 2023-04-01 RX ADMIN — METOPROLOL TARTRATE 25 MG: 25 TABLET, FILM COATED ORAL at 08:45

## 2023-04-01 RX ADMIN — DOXYCYCLINE HYCLATE 100 MG: 100 CAPSULE ORAL at 21:14

## 2023-04-01 RX ADMIN — CHOLECALCIFEROL TAB 125 MCG (5000 UNIT) 5000 UNITS: 125 TAB at 08:45

## 2023-04-01 RX ADMIN — APIXABAN 5 MG: 5 TABLET, FILM COATED ORAL at 21:14

## 2023-04-01 RX ADMIN — BUDESONIDE AND FORMOTEROL FUMARATE DIHYDRATE 2 PUFF: 160; 4.5 AEROSOL RESPIRATORY (INHALATION) at 07:44

## 2023-04-01 RX ADMIN — ALOGLIPTIN 12.5 MG: 12.5 TABLET, FILM COATED ORAL at 08:45

## 2023-04-01 RX ADMIN — ANASTROZOLE 1 MG: 1 TABLET ORAL at 08:45

## 2023-04-01 RX ADMIN — APIXABAN 5 MG: 5 TABLET, FILM COATED ORAL at 08:45

## 2023-04-01 RX ADMIN — IPRATROPIUM BROMIDE AND ALBUTEROL SULFATE 3 ML: 2.5; .5 SOLUTION RESPIRATORY (INHALATION) at 20:36

## 2023-04-01 RX ADMIN — IPRATROPIUM BROMIDE AND ALBUTEROL SULFATE 3 ML: 2.5; .5 SOLUTION RESPIRATORY (INHALATION) at 07:44

## 2023-04-01 RX ADMIN — DOCUSATE SODIUM 100 MG: 100 CAPSULE, LIQUID FILLED ORAL at 09:00

## 2023-04-01 RX ADMIN — SODIUM CHLORIDE, PRESERVATIVE FREE 10 ML: 5 INJECTION INTRAVENOUS at 17:18

## 2023-04-01 NOTE — PROGRESS NOTES
Name: Dru Taylor MRN: [id number]   : 1950 Hospital: [facility]   Date: 2023        IMPRESSION:   HAYLEY in a patient with several risk factors. GFR is already improving. The renal US is normal  Electrolytes are acceptable  Multiple myeloma - started on Revlimid and Velcade. I doubt she has Myeloma kidney due to the normal kidney size. Hypervolemia, responded to diuresis. PLAN:   Continue present therapy. Renal panel in AM  If discharge she will need outpatient follow up. Subjective/Interval History:   I have reviewed the flowsheet and previous days notes. ROS:- patient feels much better. She is inquiring about going home. No edema, + mild baseline SOB    Objective:   Vital Signs:    Visit Vitals  /65   Pulse 76   Temp 98.1 °F (36.7 °C)   Resp 23   Ht 5' 4\" (1.626 m)   Wt 103.6 kg (228 lb 6.3 oz)   SpO2 95%   BMI 39.20 kg/m²       O2 Device: None (Room air)   O2 Flow Rate (L/min): 2 l/min   Temp (24hrs), Av °F (36.7 °C), Min:97.4 °F (36.3 °C), Max:98.2 °F (36.8 °C)       Intake/Output:   Last shift:      No intake/output data recorded. Last 3 shifts:  1901 -  0700  In: 1041.9 [P.O.:872; I.V.:169.9]  Out: 1900 [Urine:1900]    Intake/Output Summary (Last 24 hours) at 2023 1445  Last data filed at 2023 0600  Gross per 24 hour   Intake 872 ml   Output 1050 ml   Net -178 ml        Physical Exam:  General:    Alert, cooperative, no distress, appears stated age. Very pleasant   Head:   Normocephalic, without obvious abnormality, atraumatic. Eyes:   Conjunctivae/corneas clear. Nose:  Nares normal. No drainage or sinus tenderness. Throat:    Lips, mucosa, and tongue normal.  No Thrush  Neck:  Supple, symmetrical,  no adenopathy, thyroid: non tender    no carotid bruit and no JVD. Lungs:   Diminished to auscultation bilaterally. + Wheezing or Rhonchi. No rales. Chest wall:  No tenderness or deformity. No Accessory muscle use.   Heart:   Regular rate and rhythm,  no murmur, rub or gallop. Abdomen:   Soft, non-tender. Not distended. Bowel sounds normal.  Extremities: Extremities normal, atraumatic, No cyanosis. Trace edema only. Skin:     Texture, turgor normal. No rashes or lesions. Not Jaundiced  Psych:  Good insight. Not depressed. Not anxious or agitated. Neurologic: Normal strength, Alert and oriented X 3.        DATA:  Labs:  Recent Labs     04/01/23  0400 03/31/23  0339 03/30/23  0306    135* 141   K 3.5 3.8 4.2    107 113*   CO2 25 21 18*   BUN 23* 26* 17   CREA 1.79* 1.98* 1.50*   CA 7.6*  7.6* 7.6* 7.5*   ALB 2.6* 2.4* 2.5*   PHOS  --  3.4 3.2   MG 2.0 1.9  --      Recent Labs     04/01/23  0400 03/31/23  0339 03/29/23  1756   WBC 12.6* 9.5 10.9   HGB 9.5* 9.9* 11.4*   HCT 29.4* 30.2* 35.2   * 467* 570*     Recent Labs     03/31/23  1751   ARACELI 30   KU 28   CLU 54   CREAU 47.00       Total time spent with patient:  35 minutes    [] Critical Care Provided    Care Plan discussed with:   Staff, Medical Team    Taylor Blanco MD

## 2023-04-01 NOTE — PROGRESS NOTES
Hospitalist Progress Note    NAME: Yi Miller   :  1950   MRN:  [id number]     Subjective:   Daily Progress Note: 2023 1:55 PM    Chief complaint: Worsening shortness of breath    3/30/2023:  Patient seen and examined in ED, chart was reviewed. Spoke with  at bedside. Patient progressively getting short of breath for last several days  She was using 's home oxygen. She is noted to have A-fib with RVR on Cardizem drip,   cardiology on case recommending anticoagulation if okay with hematology  Patient continues to have intermittent palpitations and dyspnea on exertion. Patient being transferred to ICU level of care at this time. Bilateral lower extremity Dopplers noted to be negative. 3/31/23: Feels much better. Breathing better. No pain, n/v. Converted to Sinus. Still on Cardizem gtt --> titrating down. Echo with good EF.     -----------------  2023; reports feeling much better. Not on Cardizem drip. No pain reported. Still on oxygen with nasal cannula. Asks about going home. States that she lives with her  who is 100% disabled . Recounts experience of rash with Revlimid.          Current Facility-Administered Medications   Medication Dose Route Frequency    furosemide (LASIX) tablet 40 mg  40 mg Oral DAILY    apixaban (ELIQUIS) tablet 5 mg  5 mg Oral BID    metoprolol tartrate (LOPRESSOR) tablet 25 mg  25 mg Oral Q12H    dilTIAZem (CARDIZEM) 125 mg in 0.9% sodium chloride 125 mL (Cgro1Ogy)  0-15 mg/hr IntraVENous TITRATE    doxycycline (VIBRAMYCIN) capsule 100 mg  100 mg Oral Q12H    albuterol-ipratropium (DUO-NEB) 2.5 MG-0.5 MG/3 ML  3 mL Nebulization Q2H PRN    acyclovir (ZOVIRAX) capsule 400 mg  400 mg Oral BID    albuterol-ipratropium (DUO-NEB) 2.5 MG-0.5 MG/3 ML  3 mL Nebulization TID RT    anastrozole (ARIMIDEX) tablet 1 mg  1 mg Oral DAILY    aspirin delayed-release tablet 81 mg  81 mg Oral DAILY    cholecalciferol (VITAMIN D3) (5000 Units/125 mcg) tablet 5,000 Units  5,000 Units Oral DAILY    venlafaxine-SR (EFFEXOR-XR) capsule 150 mg  150 mg Oral DAILY    glipiZIDE (GLUCOTROL) tablet 5 mg  5 mg Oral ACB    levothyroxine (SYNTHROID) tablet 100 mcg  100 mcg Oral ACB    hydrOXYzine pamoate (VISTARIL) capsule 25 mg  25 mg Oral QHS PRN    HYDROcodone-acetaminophen (NORCO) 5-325 mg per tablet 1 Tablet  1 Tablet Oral QID PRN    pantoprazole (PROTONIX) tablet 40 mg  40 mg Oral ACB    alogliptin (NESINA) tablet 12.5 mg  12.5 mg Oral DAILY    budesonide-formoteroL (SYMBICORT) 160-4.5 mcg/actuation HFA inhaler 2 Puff  2 Puff Inhalation BID RT    And    [Held by provider] tiotropium bromide (SPIRIVA RESPIMAT) 2.5 mcg /actuation  2 Puff Inhalation DAILY    insulin lispro (HUMALOG) injection   SubCUTAneous AC&HS    glucose chewable tablet 16 g  4 Tablet Oral PRN    glucagon (GLUCAGEN) injection 1 mg  1 mg IntraMUSCular PRN    dextrose 10% infusion 0-250 mL  0-250 mL IntraVENous PRN    sodium chloride (NS) flush 5-40 mL  5-40 mL IntraVENous Q8H    sodium chloride (NS) flush 5-40 mL  5-40 mL IntraVENous PRN    albuterol CONCENTRATE 2.5mg/0.5 mL neb soln  2.5 mg Nebulization Q4H PRN    acetaminophen (TYLENOL) tablet 650 mg  650 mg Oral Q4H PRN    ondansetron (ZOFRAN ODT) tablet 4 mg  4 mg Oral Q6H PRN    docusate sodium (COLACE) capsule 100 mg  100 mg Oral BID          Objective:     Visit Vitals  BP (!) 142/82 (BP 1 Location: Left upper arm, BP Patient Position: At rest)   Pulse 77   Temp 98 °F (36.7 °C)   Resp 20   Ht 5' 4\" (1.626 m)   Wt 103.6 kg (228 lb 6.3 oz)   SpO2 96%   BMI 39.20 kg/m²    O2 Flow Rate (L/min): 2 l/min O2 Device: Nasal cannula    Temp (24hrs), Av °F (36.7 °C), Min:97.4 °F (36.3 °C), Max:98.3 °F (36.8 °C)      Physical Exam  Vitals and nursing note reviewed. Constitutional:       Appearance: Normal appearance. She is normal weight. HENT:      Head: Normocephalic and atraumatic.       Nose: Nose normal.      Mouth/Throat:      Mouth: Mucous membranes are moist.      Pharynx: Oropharynx is clear. Eyes:      Conjunctiva/sclera: Conjunctivae normal.   Cardiovascular:      Rate and Rhythm: Normal rate and regular rhythm. Pulses: Normal pulses. Heart sounds: Normal heart sounds. Pulmonary:      Effort: Pulmonary effort is normal.      Breath sounds: Normal breath sounds. Abdominal:      General: Abdomen is flat. Palpations: Abdomen is soft. Musculoskeletal:      Right lower leg: Edema present. Left lower leg: Edema present. Skin:     General: Skin is warm and dry. Coloration: Skin is not pale. Findings: No erythema. Neurological:      General: No focal deficit present. Mental Status: She is alert and oriented to person, place, and time.    Psychiatric:         Mood and Affect: Mood normal.         Behavior: Behavior normal.        Data Review    Recent Results (from the past 24 hour(s))   EKG, 12 LEAD, INITIAL    Collection Time: 03/29/23  5:36 PM   Result Value Ref Range    Ventricular Rate 87 BPM    Atrial Rate 87 BPM    P-R Interval 164 ms    QRS Duration 76 ms    Q-T Interval 368 ms    QTC Calculation (Bezet) 442 ms    Calculated P Axis 59 degrees    Calculated R Axis 85 degrees    Calculated T Axis 50 degrees    Diagnosis       Normal sinus rhythm  Low voltage QRS  Septal infarct , age undetermined  Abnormal ECG    Confirmed by [facility] COLEMANArlene BELCHER ([id number]) on 3/30/2023 12:58:25 PM     CBC WITH AUTOMATED DIFF    Collection Time: 03/29/23  5:56 PM   Result Value Ref Range    WBC 10.9 3.6 - 11.0 K/uL    RBC 4.06 3.80 - 5.20 M/uL    HGB 11.4 (L) 11.5 - 16.0 g/dL    HCT 35.2 35.0 - 47.0 %    MCV 86.7 80.0 - 99.0 FL    MCH 28.1 26.0 - 34.0 PG    MCHC 32.4 30.0 - 36.5 g/dL    RDW 15.7 (H) 11.5 - 14.5 %    PLATELET 787 (H) 352 - 400 K/uL    MPV 10.7 8.9 - 12.9 FL    NRBC 0.0 0.0  WBC    ABSOLUTE NRBC 0.00 0.00 - 0.01 K/uL    NEUTROPHILS 75 32 - 75 %    LYMPHOCYTES 13 12 - 49 %    MONOCYTES 9 5 - 13 % EOSINOPHILS 2 0 - 7 %    BASOPHILS 1 0 - 1 %    IMMATURE GRANULOCYTES 0 0 - 0.5 %    ABS. NEUTROPHILS 8.1 (H) 1.8 - 8.0 K/UL    ABS. LYMPHOCYTES 1.4 0.8 - 3.5 K/UL    ABS. MONOCYTES 1.0 0.0 - 1.0 K/UL    ABS. EOSINOPHILS 0.3 0.0 - 0.4 K/UL    ABS. BASOPHILS 0.1 0.0 - 0.1 K/UL    ABS. IMM. GRANS. 0.0 0.00 - 0.04 K/UL    DF AUTOMATED     METABOLIC PANEL, COMPREHENSIVE    Collection Time: 03/29/23  5:56 PM   Result Value Ref Range    Sodium 139 136 - 145 mmol/L    Potassium 4.0 3.5 - 5.1 mmol/L    Chloride 110 (H) 97 - 108 mmol/L    CO2 22 21 - 32 mmol/L    Anion gap 7 5 - 15 mmol/L    Glucose 96 65 - 100 mg/dL    BUN 18 6 - 20 mg/dL    Creatinine 1.61 (H) 0.55 - 1.02 mg/dL    BUN/Creatinine ratio 11 (L) 12 - 20      eGFR 33 (L) >60 ml/min/1.73m2    Calcium 8.2 (L) 8.5 - 10.1 mg/dL    Bilirubin, total 0.3 0.2 - 1.0 mg/dL    AST (SGOT) 12 (L) 15 - 37 U/L    ALT (SGPT) 15 12 - 78 U/L    Alk.  phosphatase 118 (H) 45 - 117 U/L    Protein, total 6.9 6.4 - 8.2 g/dL    Albumin 2.9 (L) 3.5 - 5.0 g/dL    Globulin 4.0 2.0 - 4.0 g/dL    A-G Ratio 0.7 (L) 1.1 - 2.2     LACTIC ACID    Collection Time: 03/29/23  5:56 PM   Result Value Ref Range    Lactic acid 1.4 0.4 - 2.0 mmol/L   CULTURE, BLOOD, PAIRED    Collection Time: 03/29/23  5:56 PM    Specimen: Blood   Result Value Ref Range    Special Requests: No Special Requests      Culture result: No growth after 4 hours     NT-PRO BNP    Collection Time: 03/29/23  5:56 PM   Result Value Ref Range    NT pro-BNP 2,359 (H) <125 pg/mL   TROPONIN-HIGH SENSITIVITY    Collection Time: 03/29/23  5:56 PM   Result Value Ref Range    Troponin-High Sensitivity 8 0 - 51 ng/L   GLUCOSE, POC    Collection Time: 03/29/23 10:33 PM   Result Value Ref Range    Glucose (POC) 107 (H) 65 - 100 mg/dL    Performed by Irma Davies    HEMOGLOBIN A1C WITH EAG    Collection Time: 03/30/23  3:06 AM   Result Value Ref Range    Hemoglobin A1c 8.1 (H) 4.0 - 5.6 %    Est. average glucose 186 mg/dL   RENAL FUNCTION PANEL    Collection Time: 03/30/23  3:06 AM   Result Value Ref Range    Sodium 141 136 - 145 mmol/L    Potassium 4.2 3.5 - 5.1 mmol/L    Chloride 113 (H) 97 - 108 mmol/L    CO2 18 (L) 21 - 32 mmol/L    Anion gap 10 5 - 15 mmol/L    Glucose 164 (H) 65 - 100 mg/dL    BUN 17 6 - 20 mg/dL    Creatinine 1.50 (H) 0.55 - 1.02 mg/dL    BUN/Creatinine ratio 11 (L) 12 - 20      eGFR 36 (L) >60 ml/min/1.73m2    Calcium 7.5 (L) 8.5 - 10.1 mg/dL    Phosphorus 3.2 2.6 - 4.7 mg/dL    Albumin 2.5 (L) 3.5 - 5.0 g/dL   TROPONIN-HIGH SENSITIVITY    Collection Time: 03/30/23  3:06 AM   Result Value Ref Range    Troponin-High Sensitivity 6 0 - 51 ng/L   URINALYSIS W/ RFLX MICROSCOPIC    Collection Time: 03/30/23  3:19 AM   Result Value Ref Range    Color Yellow/Straw      Appearance Clear Clear      Specific gravity 1.005 1.003 - 1.030      pH (UA) 5.0 5.0 - 8.0      Protein Negative Negative mg/dL    Glucose >300 (A) Negative mg/dL    Ketone 5 (A) Negative mg/dL    Bilirubin Negative Negative      Blood Small (A) Negative      Urobilinogen 0.1 0.1 - 1.0 EU/dL    Nitrites Negative Negative      Leukocyte Esterase Negative Negative      WBC 0-4 0 - 4 /hpf    RBC 0-5 0 - 5 /hpf    Bacteria 1+ (A) Negative /hpf   URINE MICROSCOPIC    Collection Time: 03/30/23  3:19 AM   Result Value Ref Range    WBC 0-4 0 - 4 /hpf    RBC 0-5 0 - 5 /hpf    Bacteria 1+ (A) Negative /hpf   EKG, 12 LEAD, INITIAL    Collection Time: 03/30/23  6:10 AM   Result Value Ref Range    Ventricular Rate 151 BPM    Atrial Rate 153 BPM    QRS Duration 84 ms    Q-T Interval 326 ms    QTC Calculation (Bezet) 516 ms    Calculated R Axis 25 degrees    Calculated T Axis -171 degrees    Diagnosis       Poor data quality, interpretation may be adversely affected  Atrial fibrillation with rapid ventricular response with premature   ventricular or aberrantly conducted complexes  Low voltage QRS  Cannot rule out Anteroseptal infarct (cited on or before 29-MAR-2023)  Abnormal ECG  When compared with ECG of 29-MAR-2023 17:36, (Unconfirmed)  Atrial fibrillation has replaced Sinus rhythm  Vent. rate has increased BY  64 BPM  Questionable change in initial forces of Anterior leads  Nonspecific T wave abnormality no longer evident in Anterior leads  Conf[facility] by [facility] ([id number]) on 3/30/2023 12:58:16 PM     TROPONIN-HIGH SENSITIVITY    Collection Time: 03/30/23  6:33 AM   Result Value Ref Range    Troponin-High Sensitivity 8 0 - 51 ng/L   GLUCOSE, POC    Collection Time: 03/30/23  8:23 AM   Result Value Ref Range    Glucose (POC) 176 (H) 65 - 100 mg/dL    Performed by [facility]    EKG, 12 LEAD, INITIAL    Collection Time: 03/30/23 10:32 AM   Result Value Ref Range    Ventricular Rate 129 BPM    Atrial Rate 138 BPM    QRS Duration 86 ms    Q-T Interval 306 ms    QTC Calculation (Bezet) 448 ms    Calculated R Axis -15 degrees    Calculated T Axis -129 degrees    Diagnosis       Atrial fibrillation with rapid ventricular response with premature   ventricular or aberrantly conducted complexes  Low voltage QRS  Cannot rule out Anteroseptal infarct , age undetermined  ST & T wave abnormality, consider inferior ischemia  Abnormal ECG    Confirmed by [facility] 85 ([id number]) on 3/30/2023 12:58:43 PM     GLUCOSE, POC    Collection Time: 03/30/23 11:57 AM   Result Value Ref Range    Glucose (POC) 170 (H) 65 - 100 mg/dL    Performed by [facility]      No results found for this visit on 03/29/23.   All Micro Results       Procedure Component Value Units Date/Time    CULTURE, BLOOD, PAIRED [[id number]]  (Abnormal) Collected: 03/29/23 9102    Order Status: Completed Specimen: Blood Updated: 04/01/23 0631     Special Requests: No Special Requests        Culture result:       Staphylococcus species, coagulase negative growing in 2 of 4 bottles drawn No Site Indicated Idenfitication and susceptibility to follow            (NOTE) [facility] CALLED TO FERMÍN SAHA, , AT 1351 ON 3/31/23 BY GENESIS CULTURE, BLOOD, PAIRED [[id number]] Collected: 04/01/23 0400    Order Status: Sent Specimen: Blood Updated: 04/01/23 0447    BLOOD CULTURE ID PANEL [[id number]]  (Abnormal) Collected: 03/29/23 1756    Order Status: Completed Specimen: Blood Updated: 03/31/23 1441     Acc. no. from Micro Order Blood Culture Bottle        Enterococcus faecalis Not detected     Enterococcus faecium Not detected     Listeria monocytogenes Not detected     Staphylococcus Detected        Staphylococcus aureus Not detected     Staph epidermidis Detected        Staph lugdunensis Not detected     Streptococcus Not detected     Streptococcus agalactiae (Group B) Not detected     Streptococcus pneumoniae Not detected     Streptococcus pyogenes (Group A) Not detected     Acinetobacter calcoaceticus-baumanii complex Not detected     Bacteroides fragilis Not detected     Enterobacterales species Not detected     Enterobacter cloacae complex Not detected     Escherichia coli Not detected     Klebsiella aerogenes Not detected     Klebsiella oxytoca Not detected     Klebsiella pneumoniae group Not detected     Proteus Not detected     Salmonella Not detected     Serratia marcescens Not detected     Haemophilus influenzae Not detected     Neisseria meningitidis Not detected     Pseudomonas aeruginosa Not detected     Steno maltophilia Not detected     Candida albicans Not detected     Candida auris Not detected     Candida glabrata Not detected     Candida krusei Not detected     Candida parapsilosis Not detected     Candida tropicalis Not detected     Crypto neoformans/gattii Not detected     RESISTANT GENES:          MECA/C (Methicillin resistant gene) Detected        Comment False positive results may rarely occur.  Correlate with     Comment: clinical,epidemiologic,  and other laboratory findings  Please see BCID Interpretation Guide in EPIC Links         MRSA SCREEN - PCR (NASAL) [[id number]] Collected: 03/30/23 1500    Order Status: Completed Specimen: Swab Updated: 03/30/23 1709     MRSA by PCR, Nasal Not Detected       CULTURE, RESPIRATORY/SPUTUM/BRONCH Lidia Zhang [[id number]]     Order Status: Sent Specimen: Sputum           CXR-IMPRESSION  Small left pleural effusion with bilateral pulmonary infiltrates. Assessment with MDM and DDx/Plan:     Acute hypoxic respiratory failure  Multifactorial CHF d/t A Fib/RVR + AECOPD; Questionable PNA    A-fib with RVR suspected new diagnosis --> SOral Lopressor twice daily; on [facility] following      Acute kidney injury on CKD-Appreciate nephrology consult  AECOPD    Bacteremia?-Staph coag negative in 2/4 bottles  Appreciate ID input-he thinks this is a contaminant and he does not favor antibiotic intervention. Anemia; complex d/t hx MM/Cancer    New/active Dx of Multiple myeloma-hematology consulted; follows with Dr. Sarah Elizondo    Hx Breast cancer-continue previous medications    DMT2-monitor BG on sliding scale    Hypothyroidism-continue home levothyroxine    CKD stage III-monitor creatinine    Plan: 4/1/2023  Transfer out of ICU. On apixaban  Off Cardizem drip. Will need PT OT and may need home oxygen. Oncology to follow-up regarding artery disease development given rash.;  Could be done outpatient  Appreciate nephrology input.   Renal function appears stable  Appreciate ID input.-No role for antibiotics; most likely contaminant    Advance directive-full code  DVT prophylaxis-subcu Lovenox  Next of kin-   Social determinants of health-none    Dispo-more than 48 hours pending clinical improvement and medical stability monitor closely in ICU; transfer to tele when off of cardizem and stable from cardiac standpoint    Signed By: Leonela Alvarado MD     April 1, 2023

## 2023-04-01 NOTE — PROGRESS NOTES
Problem: Pressure Injury - Risk of  Goal: *Prevention of pressure injury  Description: Document Faisal Scale and appropriate interventions in the flowsheet. Outcome: Progressing Towards Goal  Note: Pressure Injury Interventions:       Moisture Interventions: Absorbent underpads, Apply protective barrier, creams and emollients, Maintain skin hydration (lotion/cream), Minimize layers, Moisture barrier    Activity Interventions: Increase time out of bed    Mobility Interventions: Float heels, HOB 30 degrees or less, Pressure redistribution bed/mattress (bed type)    Nutrition Interventions: Document food/fluid/supplement intake    Friction and Shear Interventions: Apply protective barrier, creams and emollients                Problem: Patient Education: Go to Patient Education Activity  Goal: Patient/Family Education  Outcome: Progressing Towards Goal     Problem: Falls - Risk of  Goal: *Absence of Falls  Description: Document Guido Fall Risk and appropriate interventions in the flowsheet.   Outcome: Progressing Towards Goal  Note: Fall Risk Interventions:  Mobility Interventions: Communicate number of staff needed for ambulation/transfer, Patient to call before getting OOB         Medication Interventions: Patient to call before getting OOB, Bed/chair exit alarm, Teach patient to arise slowly    Elimination Interventions: Bed/chair exit alarm, Call light in reach, Patient to call for help with toileting needs              Problem: Patient Education: Go to Patient Education Activity  Goal: Patient/Family Education  Outcome: Progressing Towards Goal

## 2023-04-01 NOTE — PROGRESS NOTES
IMPRESSION:   Acute hypoxic respiratory failure  A-fib with RVR improved  Acute exacerbation of COPD  Tobacco abuse  Acute kidney injury  Multiple myeloma with lytic lesions to the bone  Pt is critically ill.  Time spent with pt and staff actively rendering care, managing pt and coordinating care as stated below; 30 minutes, exclusive of any procedures      RECOMMENDATIONS/PLAN:   ICU monitoring  78-year-old lady came in with other shortness of breath dyspnea and she was found to be in A-fib with RVR started on IV Cardizem and also received beta-blocker IV Cardizem was stopped yesterday 3/31  COPD better we will discontinue Solu-Medrol start patient on prednisone, arterial blood gases shows normal PCO2 on 2 L nasal cannula PO2 65  He is not on oxygen at home we will reevaluate before discharge  History of multiple myeloma the patient has bony lytic lesions received chemotherapy  Chest x-ray shows pulmonary edema haziness of right upper lobe left effusion and left lower lobe atelectasis  Worsening of the renal function on IV Cardizem and also on Lasix 40 every 12 hours  Will be available to assist in medical management while in the CCU pending disposition     [x] High complexity decision making was performed  [x] See my orders for details  HPI  78-year-old lady came in because of difficulty breathing along with having cough past medical history of coronary artery disease multiple myeloma received chemotherapy yesterday and she was complaining of leg swelling shortness of breath and dyspnea she also had a history of COPD was with me regularly along with coronary artery disease, she is starting having wheezing using inhalers she is not on any oxygen at home so came to the hospital got admitted to ICU     PMH:  has a past medical history of Arthritis, Asthma, Cancer ([address] Chronic pain, COPD (chronic obstructive pulmonary disease) ([address] Depressive disorder (9/22/2020), Diabetes ([address] Essential hypertension (9/22/2020), GERD (gastroesophageal reflux disease), Hypothyroidism (9/22/2020), Malignant tumor of breast ([facility] (9/22/2020), Osteoarthritis (9/22/2020), Pure hypercholesterolemia (9/22/2020), Sleep apnea, Thyroid disease (1980), and Type 2 diabetes mellitus without complication ([facility] (1/27/7473). PSH:   has a past surgical history that includes hx hymenectomy; hx heent; hx colonoscopy (10/20/2017); hx colonoscopy (2014); hx colonoscopy (2007); pr unlisted procedure breast (Right, 02/2016); pr mastectomy partial (Right, 2016); hx cholecystectomy (2013); and hx orthopaedic (Left, 07/07/2020). FHX: family history includes Cancer in her father; Diabetes in her brother, brother, brother, and sister; Heart Attack in her mother; Heart Disease in her brother, brother, and mother; Hypertension in her mother; Willetta Roslindale in her brother; Lung Disease in her brother; Other in her father; Ovarian Cancer in her sister; Thyroid Disease in her mother. SHX:  reports that she has been smoking cigarettes. She has a 50.00 pack-year smoking history. She has never used smokeless tobacco. She reports that she does not currently use alcohol. She reports that she does not use drugs.     ALL:   Allergies   Allergen Reactions    Ppd Black Rubber Mix Other (comments)     Cellulitis    Erythromycin Base Unknown (comments)    Other Medication Other (comments)     TUBERCULOSIS  PT GETS CELLULITIS            Parafon Forte Dsc [Chlorzoxazone] Unknown (comments)    Ampicillin Unknown (comments) and Rash        MEDS:   [x] Reviewed - As Below   [] Not reviewed    Current Facility-Administered Medications   Medication    furosemide (LASIX) tablet 40 mg    apixaban (ELIQUIS) tablet 5 mg    metoprolol tartrate (LOPRESSOR) tablet 25 mg    dilTIAZem (CARDIZEM) 125 mg in 0.9% sodium chloride 125 mL ([id number])    doxycycline (VIBRAMYCIN) capsule 100 mg    albuterol-ipratropium (DUO-NEB) 2.5 MG-0.5 MG/3 ML    acyclovir (ZOVIRAX) capsule 400 mg albuterol-ipratropium (DUO-NEB) 2.5 MG-0.5 MG/3 ML    anastrozole (ARIMIDEX) tablet 1 mg    aspirin delayed-release tablet 81 mg    cholecalciferol (VITAMIN D3) (5000 Units/125 mcg) tablet 5,000 Units    venlafaxine-SR (EFFEXOR-XR) capsule 150 mg    glipiZIDE (GLUCOTROL) tablet 5 mg    levothyroxine (SYNTHROID) tablet 100 mcg    hydrOXYzine pamoate (VISTARIL) capsule 25 mg    HYDROcodone-acetaminophen (NORCO) 5-325 mg per tablet 1 Tablet    pantoprazole (PROTONIX) tablet 40 mg    alogliptin (NESINA) tablet 12.5 mg    budesonide-formoteroL (SYMBICORT) 160-4.5 mcg/actuation HFA inhaler 2 Puff    And    [Held by provider] tiotropium bromide (SPIRIVA RESPIMAT) 2.5 mcg /actuation    insulin lispro (HUMALOG) injection    glucose chewable tablet 16 g    glucagon (GLUCAGEN) injection 1 mg    dextrose 10% infusion 0-250 mL    sodium chloride (NS) flush 5-40 mL    sodium chloride (NS) flush 5-40 mL    albuterol CONCENTRATE 2.5mg/0.5 mL neb soln    acetaminophen (TYLENOL) tablet 650 mg    ondansetron (ZOFRAN ODT) tablet 4 mg    docusate sodium (COLACE) capsule 100 mg      MAR reviewed and pertinent medications noted or modified as needed   Current Facility-Administered Medications   Medication    furosemide (LASIX) tablet 40 mg    apixaban (ELIQUIS) tablet 5 mg    metoprolol tartrate (LOPRESSOR) tablet 25 mg    dilTIAZem (CARDIZEM) 125 mg in 0.9% sodium chloride 125 mL ([id number])    doxycycline (VIBRAMYCIN) capsule 100 mg    albuterol-ipratropium (DUO-NEB) 2.5 MG-0.5 MG/3 ML    acyclovir (ZOVIRAX) capsule 400 mg    albuterol-ipratropium (DUO-NEB) 2.5 MG-0.5 MG/3 ML    anastrozole (ARIMIDEX) tablet 1 mg    aspirin delayed-release tablet 81 mg    cholecalciferol (VITAMIN D3) (5000 Units/125 mcg) tablet 5,000 Units    venlafaxine-SR (EFFEXOR-XR) capsule 150 mg    glipiZIDE (GLUCOTROL) tablet 5 mg    levothyroxine (SYNTHROID) tablet 100 mcg    hydrOXYzine pamoate (VISTARIL) capsule 25 mg    HYDROcodone-acetaminophen (Erazo Kale) 5-325 mg per tablet 1 Tablet    pantoprazole (PROTONIX) tablet 40 mg    alogliptin (NESINA) tablet 12.5 mg    budesonide-formoteroL (SYMBICORT) 160-4.5 mcg/actuation HFA inhaler 2 Puff    And    [Held by provider] tiotropium bromide (SPIRIVA RESPIMAT) 2.5 mcg /actuation    insulin lispro (HUMALOG) injection    glucose chewable tablet 16 g    glucagon (GLUCAGEN) injection 1 mg    dextrose 10% infusion 0-250 mL    sodium chloride (NS) flush 5-40 mL    sodium chloride (NS) flush 5-40 mL    albuterol CONCENTRATE 2.5mg/0.5 mL neb soln    acetaminophen (TYLENOL) tablet 650 mg    ondansetron (ZOFRAN ODT) tablet 4 mg    docusate sodium (COLACE) capsule 100 mg      PMH:  has a past medical history of Arthritis, Asthma, Cancer ([facility] Chronic pain, COPD (chronic obstructive pulmonary disease) ([facility] Depressive disorder (9/22/2020), Diabetes ([facility] Essential hypertension (9/22/2020), GERD (gastroesophageal reflux disease), Hypothyroidism (9/22/2020), Malignant tumor of breast ([facility] (9/22/2020), Osteoarthritis (9/22/2020), Pure hypercholesterolemia (9/22/2020), Sleep apnea, Thyroid disease (1980), and Type 2 diabetes mellitus without complication ([facility] (7/19/5549). PSH:   has a past surgical history that includes hx hymenectomy; hx heent; hx colonoscopy (10/20/2017); hx colonoscopy (2014); hx colonoscopy (2007); pr unlisted procedure breast (Right, 02/2016); pr mastectomy partial (Right, 2016); hx cholecystectomy (2013); and hx orthopaedic (Left, 07/07/2020). FHX: family history includes Cancer in her father; Diabetes in her brother, brother, brother, and sister; Heart Attack in her mother; Heart Disease in her brother, brother, and mother; Hypertension in her mother; Jania Vibha in her brother; Lung Disease in her brother; Other in her father; Ovarian Cancer in her sister; Thyroid Disease in her mother. SHX:  reports that she has been smoking cigarettes. She has a 50.00 pack-year smoking history.  She has never used smokeless tobacco. She reports that she does not currently use alcohol. She reports that she does not use drugs. ROS:A comprehensive review of systems was negative except for that written in the HPI. Hemodynamics:    CO:    CI:    CVP:    SVR:   PAP Systolic:    PAP Diastolic:    PVR:    TH98:        Ventilator Settings:      Mode Rate TV Press PEEP FiO2 PIP Min. Vent               26 %              Vital Signs: Telemetry:    AFIB Intake/Output:   Visit Vitals  BP (!) 142/82 (BP 1 Location: Left upper arm, BP Patient Position: At rest)   Pulse 77   Temp 98 °F (36.7 °C)   Resp 20   Ht 5' 4\" (1.626 m)   Wt 103.6 kg (228 lb 6.3 oz)   SpO2 96%   BMI 39.20 kg/m²       Temp (24hrs), Av °F (36.7 °C), Min:97.4 °F (36.3 °C), Max:98.3 °F (36.8 °C)        O2 Device: Nasal cannula O2 Flow Rate (L/min): 2 l/min       Wt Readings from Last 4 Encounters:   23 103.6 kg (228 lb 6.3 oz)   23 105.2 kg (232 lb)   22 103.9 kg (229 lb)   22 101.6 kg (224 lb)          Intake/Output Summary (Last 24 hours) at 2023 0856  Last data filed at 2023 0600  Gross per 24 hour   Intake 872 ml   Output 1050 ml   Net -178 ml         Last shift:      No intake/output data recorded. Last 3 shifts: 1901 -  0700  In: 1041.9 [P.O.:872; I.V.:169.9]  Out: 1900 [Urine:1900]       Physical Exam:     General:  female; on oxygen 2 L nasal cannula  HEENT: NCAT, poor dentition, lips and mucosa dry  Eyes: anicteric; conjunctiva clear  Neck: no nodes,  trach midline; no accessory MM use. Chest: no deformity,   Cardiac: IR regular; no murmur;   Lungs: distant breath sounds; bilateral wheezes  Abd: soft, NT, hypoactive BS  Ext: no edema; no joint swelling;  No clubbing  : NO payne, clear urine  Neuro: Alert awake  Psych- no agitation, oriented to person;   Skin: warm, dry, no cyanosis;   Pulses: 1-2+ Bilateral pedal, radial  Capillary: brisk; pale      DATA:    MAR reviewed and pertinent medications noted or modified as needed  MEDS:   Current Facility-Administered Medications   Medication    furosemide (LASIX) tablet 40 mg    apixaban (ELIQUIS) tablet 5 mg    metoprolol tartrate (LOPRESSOR) tablet 25 mg    dilTIAZem (CARDIZEM) 125 mg in 0.9% sodium chloride 125 mL ([id number])    doxycycline (VIBRAMYCIN) capsule 100 mg    albuterol-ipratropium (DUO-NEB) 2.5 MG-0.5 MG/3 ML    acyclovir (ZOVIRAX) capsule 400 mg    albuterol-ipratropium (DUO-NEB) 2.5 MG-0.5 MG/3 ML    anastrozole (ARIMIDEX) tablet 1 mg    aspirin delayed-release tablet 81 mg    cholecalciferol (VITAMIN D3) (5000 Units/125 mcg) tablet 5,000 Units    venlafaxine-SR (EFFEXOR-XR) capsule 150 mg    glipiZIDE (GLUCOTROL) tablet 5 mg    levothyroxine (SYNTHROID) tablet 100 mcg    hydrOXYzine pamoate (VISTARIL) capsule 25 mg    HYDROcodone-acetaminophen (NORCO) 5-325 mg per tablet 1 Tablet    pantoprazole (PROTONIX) tablet 40 mg    alogliptin (NESINA) tablet 12.5 mg    budesonide-formoteroL (SYMBICORT) 160-4.5 mcg/actuation HFA inhaler 2 Puff    And    [Held by provider] tiotropium bromide (SPIRIVA RESPIMAT) 2.5 mcg /actuation    insulin lispro (HUMALOG) injection    glucose chewable tablet 16 g    glucagon (GLUCAGEN) injection 1 mg    dextrose 10% infusion 0-250 mL    sodium chloride (NS) flush 5-40 mL    sodium chloride (NS) flush 5-40 mL    albuterol CONCENTRATE 2.5mg/0.5 mL neb soln    acetaminophen (TYLENOL) tablet 650 mg    ondansetron (ZOFRAN ODT) tablet 4 mg    docusate sodium (COLACE) capsule 100 mg        Labs:    Recent Labs     04/01/23  0400 03/31/23  0339 03/29/23  1756   WBC 12.6* 9.5 10.9   HGB 9.5* 9.9* 11.4*   * 467* 570*       Recent Labs     04/01/23  0400 03/31/23  0339 03/30/23  0306 03/29/23  1756    135* 141 139   K 3.5 3.8 4.2 4.0    107 113* 110*   CO2 25 21 18* 22   * 290* 164* 96   BUN 23* 26* 17 18   CREA 1.79* 1.98* 1.50* 1.61*   CA 7.6* 7.6* 7.5* 8.2*   MG 2.0 1.9  --   --    PHOS  --  3.4 3.2  --    LAC  -- --   --  1.4   ALB 2.6* 2.4* 2.5* 2.9*   ALT 12  --   --  15       Recent Labs     03/31/23  0356   PH 7.35   PCO2 38   PO2 65*   HCO3 21*   FIO2 28       No results for input(s): CPK, CKNDX, TROIQ in the last 72 hours. No lab exists for component: CPKMB  No results found for: BNPP, BNP   Lab Results   Component Value Date/Time    Culture result: (A) 03/29/2023 05:56 PM     Staphylococcus species, coagulase negative growing in 2 of 4 bottles drawn No Site Indicated Idenfitication and susceptibility to follow    Culture result:  03/29/2023 05:56 PM     (NOTE) [facility] CALLED TO FERMÍN SAHA , AT 1351 ON 3/31/23 BY DG    Culture result: MRSA NOT PRESENT 06/23/2020 02:14 PM    Culture result:  06/23/2020 02:14 PM         Screening of patient nares for MRSA is for surveillance purposes and, if positive, to facilitate isolation considerations in high risk settings. It is not intended for automatic decolonization interventions per se as regimens are not sufficiently effective to warrant routine use. Lab Results   Component Value Date/Time    TSH 3.360 06/01/2022 11:58 AM        Imaging:    Results from Hospital Encounter encounter on 03/29/23    XR CHEST PORT    Narrative  INDICATION: chf    EXAMINATION:  AP CHEST, PORTABLE    COMPARISON: 3/31/2023    FINDINGS: Single AP portable view of the chest demonstrates slightly  underinflated lungs. The cardiomediastinal silhouette is unchanged. Mild  pulmonary edema, improved in the interval. Bibasilar atelectasis. Chronic left  clavicle fracture, nonunited. Impression  Improved pulmonary edema. Results from [place] encounter on 06/09/22    CT CHEST WO CONT    Narrative  CT dose reduction was achieved through use of a standardized protocol tailored  for this examination and automatic exposure control for dose modulation.     Noncontrast study is a follow-up from one year ago and shows mild emphysema and  subpleural fibrosis with some geographic aeration differences, matching findings  of previous. No evidence of nodule, edema or consolidation. Central airways are  open. No mediastinal or hilar adenopathy. Normal caliber aorta. No pleural pericardial  effusion. No significant upper abdominal or chest wall finding    Impression  No change.  No active findings      3/31 she is on 2 L nasal cannula Sleepy this a.m. worsening of renal function we will get nephrology consult chest x-ray shows CHF pulmonary edema A-fib with RVR on Cardizem discontinue Solu-Medrol start patient on prednisone  4/1 alert awake feeling better on nasal cannula off IV Cardizem, culture was positive for staph most likely contaminant coagulase-negative, on doxycycline

## 2023-04-01 NOTE — PROGRESS NOTES
Bedside shift change report given to [address] [place] [address] (oncoming nurse) by Dev Kingsley RN (offgoing nurse). Report included the following information SBAR.

## 2023-04-01 NOTE — PROGRESS NOTES
CARDIOLOGY PROGRESS NOTE      Patient Name: Ronaldo Merlos  Age: 74 y.o. Gender:female  :1950  MRN: [id number]    Patient seen and examined. This is a patient who is followed for new onset atrial fibrillation. Resting in bed, feeling much better. Some shortness of breath continues, leg swelling improving. No other complaints reported. Telemetry reviewed, converted back to sinus rhythm    Pertinent review of systems items noted above, all other systems are negative. Current medications reviewed. Physical Examination  Allergies   Allergen Reactions    Ppd Black Rubber Mix Other (comments)     Cellulitis    Erythromycin Base Unknown (comments)    Other Medication Other (comments)     TUBERCULOSIS  PT GETS CELLULITIS            Parafon Forte Dsc [Chlorzoxazone] Unknown (comments)    Ampicillin Unknown (comments) and Rash     Visit Vitals  /80 (BP 1 Location: Left upper arm, BP Patient Position: At rest)   Pulse 81   Temp 98 °F (36.7 °C)   Resp 18   Ht 5' 4\" (1.626 m)   Wt 103.6 kg (228 lb 6.3 oz)   SpO2 95%   BMI 39.20 kg/m²     Vital signs are stable. No apparent distress. Heart has a regular rate and rhythm. Normal S1, S2, soft murmur  Lungs are diminished bilaterally. Abdomen is soft, nontender, normal bowel sounds. Extremities have mild edema. Skin is dry and warm  Normal affect    Labs reviewed: Lab results reviewed. For significant abnormal values and values requiring intervention, see assessment and plan.   Recent Results (from the past 12 hour(s))   METABOLIC PANEL, COMPREHENSIVE    Collection Time: 23  4:00 AM   Result Value Ref Range    Sodium 138 136 - 145 mmol/L    Potassium 3.5 3.5 - 5.1 mmol/L    Chloride 108 97 - 108 mmol/L    CO2 25 21 - 32 mmol/L    Anion gap 5 5 - 15 mmol/L    Glucose 191 (H) 65 - 100 mg/dL    BUN 23 (H) 6 - 20 mg/dL    Creatinine 1.79 (H) 0.55 - 1.02 mg/dL    BUN/Creatinine ratio 13 12 - 20      eGFR 29 (L) >60 ml/min/1.73m2    Calcium 7.6 (L) 8.5 - 10.1 mg/dL    Bilirubin, total 0.2 0.2 - 1.0 mg/dL    AST (SGOT) 5 (L) 15 - 37 U/L    ALT (SGPT) 12 12 - 78 U/L    Alk. phosphatase 88 45 - 117 U/L    Protein, total 6.3 (L) 6.4 - 8.2 g/dL    Albumin 2.6 (L) 3.5 - 5.0 g/dL    Globulin 3.7 2.0 - 4.0 g/dL    A-G Ratio 0.7 (L) 1.1 - 2.2     MAGNESIUM    Collection Time: 04/01/23  4:00 AM   Result Value Ref Range    Magnesium 2.0 1.6 - 2.4 mg/dL   C REACTIVE PROTEIN, QT    Collection Time: 04/01/23  4:00 AM   Result Value Ref Range    C-Reactive protein 1.70 (H) 0.00 - 0.60 mg/dL   PROCALCITONIN    Collection Time: 04/01/23  4:00 AM   Result Value Ref Range    Procalcitonin <0.05 (H) 0 ng/mL   CBC WITH AUTOMATED DIFF    Collection Time: 04/01/23  4:00 AM   Result Value Ref Range    WBC 12.6 (H) 3.6 - 11.0 K/uL    RBC 3.46 (L) 3.80 - 5.20 M/uL    HGB 9.5 (L) 11.5 - 16.0 g/dL    HCT 29.4 (L) 35.0 - 47.0 %    MCV 85.0 80.0 - 99.0 FL    MCH 27.5 26.0 - 34.0 PG    MCHC 32.3 30.0 - 36.5 g/dL    RDW 15.8 (H) 11.5 - 14.5 %    PLATELET 594 (H) 877 - 400 K/uL    MPV 11.1 8.9 - 12.9 FL    NRBC 0.0 0.0  WBC    ABSOLUTE NRBC 0.00 0.00 - 0.01 K/uL    NEUTROPHILS 85 (H) 32 - 75 %    LYMPHOCYTES 7 (L) 12 - 49 %    MONOCYTES 7 5 - 13 %    EOSINOPHILS 0 0 - 7 %    BASOPHILS 0 0 - 1 %    IMMATURE GRANULOCYTES 1 (H) 0 - 0.5 %    ABS. NEUTROPHILS 10.7 (H) 1.8 - 8.0 K/UL    ABS. LYMPHOCYTES 0.9 0.8 - 3.5 K/UL    ABS. MONOCYTES 0.9 0.0 - 1.0 K/UL    ABS. EOSINOPHILS 0.0 0.0 - 0.4 K/UL    ABS. BASOPHILS 0.0 0.0 - 0.1 K/UL    ABS. IMM. GRANS. 0.1 (H) 0.00 - 0.04 K/UL    DF AUTOMATED     GLUCOSE, POC    Collection Time: 04/01/23  8:00 AM   Result Value Ref Range    Glucose (POC) 146 (H) 65 - 100 mg/dL    Performed by Nain Sharpe         Atrial fibrillation, new onset rate elevated on admission  Currently in SR, stop dilt gtt  Agree with lopressor  On Lovenox  Follow up with Dr Rob Mcgee with [facility] for [employer].   Shortness of breath, likely multifactorial given COPD and AF RVR  Back to sinus rhythm  Echo with preserved EF, mild MR  BP soft, will change to po lasix, strict I&O and weights  Venous duplexes negative for DVT  Followed by Dr. Jeannie Ponce for COPD  Nonobstructive CAD per CCTA, no chest pain reported, continue medical management with ASA  Multiple myeloma, hematology following appreciate input  Tobacco abuse, needs to quit    Please do not hesitate to call me if additional questions arise.     Luis Petty,   4/1/2023

## 2023-04-01 NOTE — CONSULTS
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 Patient states that she would become SOB with just making up the bed and out of breath walking the length of her house. No cough or sputum production. No chest pain. When she presented, she was afebrile with normal WBC but blood cultures were sent which are now growing coagulase negative Staphylococci in ?one set. She does not have a Portacath, though reportedly had one years ago when treated for breast cancer. She has no prosthetic valves or joints. CXR today showing increased pulmonary edema and questionable nodular opacity in the right upper lobe. Images viewed by me. She is only on oral Doxycycline and Acyclovir. She was seen in the ICU on nasal cannula.  at bedside.      Past Medical History:   Diagnosis Date    Arthritis     Asthma     Cancer ([address]     BREAST CANCER RIGHT BREAST LUMPECTOMY     Chronic pain     COPD (chronic obstructive pulmonary disease) ([address]     Depressive disorder 9/22/2020    Diabetes ([address]     Essential hypertension 9/22/2020    GERD (gastroesophageal reflux disease)     Hypothyroidism 9/22/2020    Malignant tumor of breast ([address] 9/22/2020    Osteoarthritis 9/22/2020    Pure hypercholesterolemia 9/22/2020    Sleep apnea     Thyroid disease 1980    REMOVED     Type 2 diabetes mellitus without complication ([address] 0/52/1307      Past Surgical History:   Procedure Laterality Date    HX CHOLECYSTECTOMY  2013    HX COLONOSCOPY  10/20/2017    HX COLONOSCOPY  2014    HX COLONOSCOPY  2007    HX HEENT      HX HYMENECTOMY      HX ORTHOPAEDIC Left 07/07/2020    left rotator cuff    TN MASTECTOMY PARTIAL Right 2016    TN UNLISTED PROCEDURE BREAST Right 02/2016    LUMPECTOMY      Family History   Problem Relation Age of Onset    Thyroid Disease Mother     Heart Disease Mother     Hypertension Mother Heart Attack Mother     Cancer Father         SPINE     Other Father         COMMITTED SUICIDE     Diabetes Sister     Ovarian Cancer Sister     Diabetes Brother     Heart Disease Brother     Diabetes Brother     Heart Disease Brother     Lung Disease Brother     Diabetes Brother     Lung Cancer Brother     Anesth Problems Neg Hx       Social History     Tobacco Use    Smoking status: Every Day     Packs/day: 1.00     Years: 50.00     Pack years: 50.00     Types: Cigarettes    Smokeless tobacco: Never   Substance Use Topics    Alcohol use: Not Currently       Current Facility-Administered Medications   Medication Dose Route Frequency Provider Last Rate Last Admin    [START ON 4/1/2023] furosemide (LASIX) tablet 40 mg  40 mg Oral DAILY Thania Rodriguez NP        apixaban (ELIQUIS) tablet 5 mg  5 mg Oral BID Suzy Rodriguez NP   5 mg at 03/31/23 2059    metoprolol tartrate (LOPRESSOR) tablet 25 mg  25 mg Oral Q12H Gildardo Zee MD   25 mg at 03/31/23 2100    dilTIAZem (CARDIZEM) 125 mg in 0.9% sodium chloride 125 mL ([id number])  0-15 mg/hr IntraVENous TITRATE Carlos Park MD   Stopped at 03/31/23 1015    doxycycline (VIBRAMYCIN) capsule 100 mg  100 mg Oral Q12H Carlos Park MD   100 mg at 03/31/23 2100    albuterol-ipratropium (DUO-NEB) 2.5 MG-0.5 MG/3 ML  3 mL Nebulization Q2H PRN Carlos Park MD   3 mL at 03/30/23 1534    acyclovir (ZOVIRAX) capsule 400 mg  400 mg Oral BID Gildardo Zee MD   400 mg at 03/31/23 2059    albuterol-ipratropium (DUO-NEB) 2.5 MG-0.5 MG/3 ML  3 mL Nebulization TID RT Kortney Wilder MD   3 mL at 03/31/23 2112    anastrozole (ARIMIDEX) tablet 1 mg  1 mg Oral DAILY Gildardo Zee MD   1 mg at 03/31/23 0846    aspirin delayed-release tablet 81 mg  81 mg Oral DAILY Gildardo Zee MD   81 mg at 03/31/23 0827    cholecalciferol (VITAMIN D3) (5000 Units/125 mcg) tablet 5,000 Units  5,000 Units Oral DAILY Gildardo Zee MD   5,000 Units at 03/31/23 0827    venlafaxine-SR (EFFEXOR-XR) capsule 150 mg  150 mg Oral DAILY Cristin Solorio MD   150 mg at 03/31/23 0827    glipiZIDE (GLUCOTROL) tablet 5 mg  5 mg Oral ACB Cristin Solorio MD   5 mg at 03/31/23 0827    levothyroxine (SYNTHROID) tablet 100 mcg  100 mcg Oral DADAB Cristin Solorio MD   100 mcg at 03/31/23 0827    hydrOXYzine pamoate (VISTARIL) capsule 25 mg  25 mg Oral QHS PRN Cristin Solorio MD   25 mg at 03/29/23 2356    HYDROcodone-acetaminophen ([facility]) 5-325 mg per tablet 1 Tablet  1 Tablet Oral QID PRN Cristin Solorio MD   1 Tablet at 03/30/23 2144    pantoprazole (PROTONIX) tablet 40 mg  40 mg Oral ACB Cristin Solorio MD   40 mg at 03/31/23 0827    alogliptin (NESINA) tablet 12.5 mg  12.5 mg Oral DAILY Cristin Solorio MD   12.5 mg at 03/31/23 0846    budesonide-formoteroL (SYMBICORT) 160-4.5 mcg/actuation HFA inhaler 2 Puff  2 Puff Inhalation BID RT Cristin Solorio MD   2 Puff at 03/31/23 2119    And    [Held by provider] tiotropium bromide (SPIRIVA RESPIMAT) 2.5 mcg /actuation  2 Puff Inhalation DAILY Cristin Solorio MD        insulin lispro (HUMALOG) injection   SubCUTAneous AC&HS Cristin Solorio MD   2 Units at 03/31/23 2100    glucose chewable tablet 16 g  4 Tablet Oral PRN Cristin Solorio MD        glucagon (GLUCAGEN) injection 1 mg  1 mg IntraMUSCular PRN Cristin Solorio MD        dextrose 10% infusion 0-250 mL  0-250 mL IntraVENous PRN Cristin Solorio MD        sodium chloride (NS) flush 5-40 mL  5-40 mL IntraVENous Q8H Cristin Solorio MD   10 mL at 03/31/23 2114    sodium chloride (NS) flush 5-40 mL  5-40 mL IntraVENous PRN Cristin Solorio MD        albuterol CONCENTRATE 2.5mg/0.5 mL neb soln  2.5 mg Nebulization Q4H PRN Cristin Solorio MD        acetaminophen (TYLENOL) tablet 650 mg  650 mg Oral Q4H PRN Cristin Solorio MD        ondansetron (ZOFRAN ODT) tablet 4 mg  4 mg Oral Q6H PRN Juventino Pineda MD        docusate sodium (COLACE) capsule 100 mg  100 mg Oral BID Juventino Pineda MD   100 mg at 03/31/23 2059        Review of Systems   Constitutional:  Negative for chills and fever. HENT: Negative. Eyes: Negative. Respiratory:  Positive for shortness of breath. Negative for cough, chest tightness, wheezing and stridor. Cardiovascular: Negative. Gastrointestinal: Negative. Endocrine: Negative. Genitourinary: Negative. Musculoskeletal: Negative. Allergic/Immunologic: Negative. Neurological: Negative. Hematological: Negative. Psychiatric/Behavioral: Negative. Objective     Vital signs for last 24 hours:  Visit Vitals  BP (!) 107/55   Pulse 87   Temp 98.2 °F (36.8 °C)   Resp 15   Ht 5' 4\" (1.626 m)   Wt 228 lb 6.3 oz (103.6 kg)   SpO2 98%   BMI 39.20 kg/m²       Intake/Output this shift:  Current Shift: No intake/output data recorded. Last 3 Shifts: 03/30 0701 - 03/31 1900  In: 819.9 [P.O.:650; I.V.:169.9]  Out: 1200 [Urine:1200]    Data Review:   Recent Results (from the past 24 hour(s))   GLUCOSE, POC    Collection Time: 03/30/23  9:46 PM   Result Value Ref Range    Glucose (POC) 239 (H) 65 - 100 mg/dL    Performed by [facility]    CBC WITH AUTOMATED DIFF    Collection Time: 03/31/23  3:39 AM   Result Value Ref Range    WBC 9.5 3.6 - 11.0 K/uL    RBC 3.54 (L) 3.80 - 5.20 M/uL    HGB 9.9 (L) 11.5 - 16.0 g/dL    HCT 30.2 (L) 35.0 - 47.0 %    MCV 85.3 80.0 - 99.0 FL    MCH 28.0 26.0 - 34.0 PG    MCHC 32.8 30.0 - 36.5 g/dL    RDW 15.6 (H) 11.5 - 14.5 %    PLATELET 415 (H) 615 - 400 K/uL    MPV 10.9 8.9 - 12.9 FL    NRBC 0.0 0.0  WBC    ABSOLUTE NRBC 0.00 0.00 - 0.01 K/uL    NEUTROPHILS 91 (H) 32 - 75 %    LYMPHOCYTES 6 (L) 12 - 49 %    MONOCYTES 3 (L) 5 - 13 %    EOSINOPHILS 0 0 - 7 %    BASOPHILS 0 0 - 1 %    IMMATURE GRANULOCYTES 0 %    ABS. NEUTROPHILS 8.6 (H) 1.8 - 8.0 K/UL    ABS. LYMPHOCYTES 0.6 (L) 0.8 - 3.5 K/UL    ABS. MONOCYTES 0.3 0.0 - 1.0 K/UL    ABS. EOSINOPHILS 0.0 0.0 - 0.4 K/UL    ABS. BASOPHILS 0.0 0.0 - 0.1 K/UL    ABS. IMM.  GRANS. 0.0 K/UL    DF Manual      PLATELET COMMENTS Large Platelets      RBC COMMENTS Anisocytosis  1+       RENAL FUNCTION PANEL    Collection Time: 03/31/23  3:39 AM   Result Value Ref Range    Sodium 135 (L) 136 - 145 mmol/L    Potassium 3.8 3.5 - 5.1 mmol/L    Chloride 107 97 - 108 mmol/L    CO2 21 21 - 32 mmol/L    Anion gap 7 5 - 15 mmol/L    Glucose 290 (H) 65 - 100 mg/dL    BUN 26 (H) 6 - 20 mg/dL    Creatinine 1.98 (H) 0.55 - 1.02 mg/dL    BUN/Creatinine ratio 13 12 - 20      eGFR 26 (L) >60 ml/min/1.73m2    Calcium 7.6 (L) 8.5 - 10.1 mg/dL    Phosphorus 3.4 2.6 - 4.7 mg/dL    Albumin 2.4 (L) 3.5 - 5.0 g/dL   MAGNESIUM    Collection Time: 03/31/23  3:39 AM   Result Value Ref Range    Magnesium 1.9 1.6 - 2.4 mg/dL   BLOOD GAS, ARTERIAL    Collection Time: 03/31/23  3:56 AM   Result Value Ref Range    pH 7.35 7.35 - 7.45      PCO2 38 35 - 45 mmHg    PO2 65 (L) 80 - 100 mmHg    O2 SATURATION 91 (L) 95 - 99 %    BICARBONATE 21 (L) 22 - 26 mmol/L    BASE DEFICIT 4.3 mmol/L    O2 METHOD Nasal Cannula      O2 FLOW RATE 2.00 L/min    FIO2 28 %    Sample source Arterial      SITE Right Radial      LOPEZ'S TEST YES      Carboxy-Hgb 0.3 (L) 1 - 2 %    Methemoglobin 0.5 0 - 1.4 %    Oxyhemoglobin 90.7 (L) 95 - 99 %    Performed by [facility]     TEMPERATURE 97.9     GLUCOSE, POC    Collection Time: 03/31/23  7:30 AM   Result Value Ref Range    Glucose (POC) 316 (H) 65 - 100 mg/dL    Performed by Sandro Cat    GLUCOSE, POC    Collection Time: 03/31/23 11:30 AM   Result Value Ref Range    Glucose (POC) 260 (H) 65 - 100 mg/dL    Performed by [address] [place] [address] Dr Magaña, POC    Collection Time: 03/31/23  3:29 PM   Result Value Ref Range    Glucose (POC) 232 (H) 65 - 100 mg/dL    Performed by WILL Austin, RANDOM    Collection Time: 03/31/23  5:51 PM   Result Value Ref Range Sodium,urine random 30 mmol/L   CREATININE, UR, RANDOM    Collection Time: 03/31/23  5:51 PM   Result Value Ref Range    Creatinine, urine random 47.00 mg/dL   PROTEIN URINE, RANDOM    Collection Time: 03/31/23  5:51 PM   Result Value Ref Range    Protein, urine random 13 (H) 0.0 - 11.9 mg/dL   CHLORIDE, URINE RANDOM    Collection Time: 03/31/23  5:51 PM   Result Value Ref Range    Chloride,urine random 55 mmol/L   POTASSIUM, UR, RANDOM    Collection Time: 03/31/23  5:51 PM   Result Value Ref Range    Potassium urine, random 28 mmol/L   GLUCOSE, POC    Collection Time: 03/31/23  8:48 PM   Result Value Ref Range    Glucose (POC) 213 (H) 65 - 100 mg/dL    Performed by Juana Moura      CXR (3/31)      Physical Exam  Vitals and nursing note reviewed. Exam conducted with a chaperone present (). Constitutional:       General: She is not in acute distress. Appearance: She is obese. She is ill-appearing. HENT:      Head: Normocephalic and atraumatic. Right Ear: External ear normal.      Left Ear: External ear normal.      Nose: Nose normal.      Comments: Nasal cannula 1.5 L/min     Mouth/Throat:      Pharynx: Oropharynx is clear. Eyes:      Pupils: Pupils are equal, round, and reactive to light. Cardiovascular:      Rate and Rhythm: Normal rate and regular rhythm. Heart sounds: No murmur heard. Pulmonary:      Effort: Pulmonary effort is normal.      Breath sounds: Normal breath sounds. Abdominal:      General: Bowel sounds are normal. There is no distension. Palpations: Abdomen is soft. Tenderness: There is no abdominal tenderness. Genitourinary:     Comments: No Sharpe catheter  Musculoskeletal:      Cervical back: Neck supple. Right lower leg: No edema. Left lower leg: No edema. Skin:     Findings: No rash. Neurological:      General: No focal deficit present. Mental Status: She is alert and oriented to person, place, and time.    Psychiatric:         Mood and Affect: Mood normal.         Behavior: Behavior normal.         Thought Content: Thought content normal.         Judgment: Judgment normal.       ASSESSMENT/PLAN    Positive blood cultures with Coagulase negative staphylococcus species, consistent with contaminant  Acute exacerbation of COPD  Acute hypoxic respiratory failure  Multiple myeloma    Comment:  Reportedly two blood culture bottles are positive but it is unclear whether they are from same set or two different sets. In any case, I think that they represent contaminants as she has no SIRS, no central line. With no prosthetic  materials at risk of seeding, I do not favor antibiotic intervention. She is on Doxycycline, however, unclear if it would affect blood cultures    No antibiotics recommended for positive blood cultures  Follow-up repeat blood cultures  Follow-up pending blood cultures  Would re-visit if she becomes febrile    Guicho Costa MD

## 2023-04-01 NOTE — PROGRESS NOTES
Progress Note    Patient: Cruz Baires MRN: [id number]  SSN: [id number]    YOB: 1950  Age: 74 y.o. Sex: female      Admit Date: 3/29/2023    LOS: 3 days     Subjective:   Patient followed for positive blood cultures with coagulase negative Staphylococci felt to be contaminant. Today, however, robin WBC and CRP are elevated, though she remains afebrile with normal procal.  Bloodd cultures repeated this morning. Objective:     Vitals:    04/01/23 0746 04/01/23 0800 04/01/23 0900 04/01/23 1105   BP:  109/80  113/65   Pulse:  89 81    Resp:  (!) 31 18    Temp:    98.1 °F (36.7 °C)   SpO2: 96%  95%    Weight:       Height:            Intake and Output:  Current Shift: No intake/output data recorded. Last three shifts: 03/30 1901 - 04/01 0700  In: 1041.9 [P.O.:872; I.V.:169.9]  Out: 1900 [Urine:1900]    Physical Exam:   Vitals and nursing note reviewed. Exam conducted with a chaperone present (). Constitutional:       General: She is not in acute distress. Appearance: She is obese. She is ill-appearing. HENT:      Head: Normocephalic and atraumatic. Right Ear: External ear normal.      Left Ear: External ear normal.      Nose: Nose normal.      Comments: Nasal cannula 2 L/min     Mouth/Throat:      Pharynx: Oropharynx is clear. Eyes:      Pupils: Pupils are equal, round, and reactive to light. Cardiovascular:      Rate and Rhythm: Normal rate and regular rhythm. Heart sounds: No murmur heard. Pulmonary:      Effort: Pulmonary effort is normal.      Breath sounds: Normal breath sounds. Abdominal:      General: Bowel sounds are normal. There is no distension. Palpations: Abdomen is soft. Tenderness: There is no abdominal tenderness. Genitourinary:     Comments: No Sharpe catheter  Musculoskeletal:      Cervical back: Neck supple. Right lower leg: No edema. Left lower leg: No edema. Skin:     Findings: No rash.    Neurological:      General: No focal deficit present. Mental Status: She is alert and oriented to person, place, and time. Psychiatric:         Mood and Affect: Mood normal.         Behavior: Behavior normal.         Thought Content: Thought content normal.         Judgment: Judgment normal    Lab/Data Review:     WBC 12,600     CRP 1.70  Procal <0.05    MRSA nasal PCR Negative    Blood cultures (3/29) Staphylococcus species, coagulase negative (2 of 4 bottles)  Blood cultures (4/1) in process      CXR (4/1) Improved pulmonary edema    Assessment:     Active Problems:    COPD with acute exacerbation (HCC) (3/29/2023)    Positive blood cultures with Coagulase negative staphylococcus species, consistent with contaminant  Leukocytosis and elevated CRP  Acute exacerbation of COPD  Acute hypoxic respiratory failure  Multiple myeloma     Comment:  Situation has changed today as patient, though afebrile, now has leukocytosis with elevated CRP. I am still apprehensive about starting Vancomycin. Plan:   No antibiotics recommended at this timee  Repeat CBC, procal and CRP  Follow-up repeat blood cultures  If she becomes febrile or WBC and CRP continue to increase, would start IV Vancomycin, also if repeat cultures are positive.   Discussed plan with patient    Signed By: Cristhian Dalal MD     April 1, 2023

## 2023-04-02 LAB
BACTERIA SPEC CULT: ABNORMAL
BACTERIA SPEC CULT: ABNORMAL
BASOPHILS # BLD: 0 K/UL (ref 0–0.1)
BASOPHILS NFR BLD: 0 % (ref 0–1)
CRP SERPL-MCNC: 0.91 MG/DL (ref 0–0.6)
DIFFERENTIAL METHOD BLD: ABNORMAL
EOSINOPHIL # BLD: 0.3 K/UL (ref 0–0.4)
EOSINOPHIL NFR BLD: 3 % (ref 0–7)
ERYTHROCYTE [DISTWIDTH] IN BLOOD BY AUTOMATED COUNT: 15.8 % (ref 11.5–14.5)
GLUCOSE BLD STRIP.AUTO-MCNC: 100 MG/DL (ref 65–100)
GLUCOSE BLD STRIP.AUTO-MCNC: 55 MG/DL (ref 65–100)
GLUCOSE BLD STRIP.AUTO-MCNC: 61 MG/DL (ref 65–100)
GLUCOSE BLD STRIP.AUTO-MCNC: 88 MG/DL (ref 65–100)
GLUCOSE BLD STRIP.AUTO-MCNC: 93 MG/DL (ref 65–100)
HCT VFR BLD AUTO: 32.1 % (ref 35–47)
HGB BLD-MCNC: 10.3 G/DL (ref 11.5–16)
IMM GRANULOCYTES # BLD AUTO: 0.1 K/UL (ref 0–0.04)
IMM GRANULOCYTES NFR BLD AUTO: 1 % (ref 0–0.5)
LYMPHOCYTES # BLD: 1.1 K/UL (ref 0.8–3.5)
LYMPHOCYTES NFR BLD: 10 % (ref 12–49)
MCH RBC QN AUTO: 27.8 PG (ref 26–34)
MCHC RBC AUTO-ENTMCNC: 32.1 G/DL (ref 30–36.5)
MCV RBC AUTO: 86.5 FL (ref 80–99)
MONOCYTES # BLD: 1.1 K/UL (ref 0–1)
MONOCYTES NFR BLD: 10 % (ref 5–13)
NEUTS SEG # BLD: 7.8 K/UL (ref 1.8–8)
NEUTS SEG NFR BLD: 76 % (ref 32–75)
NRBC # BLD: 0 K/UL (ref 0–0.01)
NRBC BLD-RTO: 0 PER 100 WBC
PERFORMED BY, TECHID: ABNORMAL
PERFORMED BY, TECHID: ABNORMAL
PERFORMED BY, TECHID: NORMAL
PLATELET # BLD AUTO: 376 K/UL (ref 150–400)
PMV BLD AUTO: 11.1 FL (ref 8.9–12.9)
PROCALCITONIN SERPL-MCNC: <0.05 NG/ML
RBC # BLD AUTO: 3.71 M/UL (ref 3.8–5.2)
SPECIAL REQUESTS,SREQ: ABNORMAL
WBC # BLD AUTO: 10.3 K/UL (ref 3.6–11)

## 2023-04-02 PROCEDURE — 97161 PT EVAL LOW COMPLEX 20 MIN: CPT

## 2023-04-02 PROCEDURE — 74011250637 HC RX REV CODE- 250/637: Performed by: NURSE PRACTITIONER

## 2023-04-02 PROCEDURE — 94761 N-INVAS EAR/PLS OXIMETRY MLT: CPT

## 2023-04-02 PROCEDURE — 74011250637 HC RX REV CODE- 250/637: Performed by: HOSPITALIST

## 2023-04-02 PROCEDURE — 74011000250 HC RX REV CODE- 250: Performed by: HOSPITALIST

## 2023-04-02 PROCEDURE — 84145 PROCALCITONIN (PCT): CPT

## 2023-04-02 PROCEDURE — 85025 COMPLETE CBC W/AUTO DIFF WBC: CPT

## 2023-04-02 PROCEDURE — 77010033678 HC OXYGEN DAILY

## 2023-04-02 PROCEDURE — 97116 GAIT TRAINING THERAPY: CPT

## 2023-04-02 PROCEDURE — 82962 GLUCOSE BLOOD TEST: CPT

## 2023-04-02 PROCEDURE — 65270000029 HC RM PRIVATE

## 2023-04-02 PROCEDURE — 94640 AIRWAY INHALATION TREATMENT: CPT

## 2023-04-02 PROCEDURE — 74011000250 HC RX REV CODE- 250: Performed by: INTERNAL MEDICINE

## 2023-04-02 PROCEDURE — 36415 COLL VENOUS BLD VENIPUNCTURE: CPT

## 2023-04-02 PROCEDURE — 86140 C-REACTIVE PROTEIN: CPT

## 2023-04-02 RX ADMIN — METOPROLOL TARTRATE 25 MG: 25 TABLET, FILM COATED ORAL at 08:42

## 2023-04-02 RX ADMIN — BUDESONIDE AND FORMOTEROL FUMARATE DIHYDRATE 2 PUFF: 160; 4.5 AEROSOL RESPIRATORY (INHALATION) at 19:25

## 2023-04-02 RX ADMIN — IPRATROPIUM BROMIDE AND ALBUTEROL SULFATE 3 ML: 2.5; .5 SOLUTION RESPIRATORY (INHALATION) at 07:54

## 2023-04-02 RX ADMIN — BUDESONIDE AND FORMOTEROL FUMARATE DIHYDRATE 2 PUFF: 160; 4.5 AEROSOL RESPIRATORY (INHALATION) at 07:54

## 2023-04-02 RX ADMIN — GLIPIZIDE 5 MG: 5 TABLET ORAL at 05:28

## 2023-04-02 RX ADMIN — PANTOPRAZOLE SODIUM 40 MG: 40 TABLET, DELAYED RELEASE ORAL at 05:29

## 2023-04-02 RX ADMIN — IPRATROPIUM BROMIDE AND ALBUTEROL SULFATE 3 ML: 2.5; .5 SOLUTION RESPIRATORY (INHALATION) at 13:39

## 2023-04-02 RX ADMIN — SODIUM CHLORIDE, PRESERVATIVE FREE 10 ML: 5 INJECTION INTRAVENOUS at 07:16

## 2023-04-02 RX ADMIN — APIXABAN 5 MG: 5 TABLET, FILM COATED ORAL at 08:42

## 2023-04-02 RX ADMIN — DOXYCYCLINE HYCLATE 100 MG: 100 CAPSULE ORAL at 08:42

## 2023-04-02 RX ADMIN — DOCUSATE SODIUM 100 MG: 100 CAPSULE, LIQUID FILLED ORAL at 20:49

## 2023-04-02 RX ADMIN — LEVOTHYROXINE SODIUM 100 MCG: 0.1 TABLET ORAL at 05:29

## 2023-04-02 RX ADMIN — ANASTROZOLE 1 MG: 1 TABLET ORAL at 08:42

## 2023-04-02 RX ADMIN — ACYCLOVIR 400 MG: 200 CAPSULE ORAL at 20:49

## 2023-04-02 RX ADMIN — ASPIRIN 81 MG: 81 TABLET, COATED ORAL at 08:42

## 2023-04-02 RX ADMIN — CHOLECALCIFEROL TAB 125 MCG (5000 UNIT) 5000 UNITS: 125 TAB at 08:42

## 2023-04-02 RX ADMIN — FUROSEMIDE 40 MG: 40 TABLET ORAL at 08:42

## 2023-04-02 RX ADMIN — VENLAFAXINE HYDROCHLORIDE 150 MG: 75 CAPSULE, EXTENDED RELEASE ORAL at 08:42

## 2023-04-02 RX ADMIN — APIXABAN 5 MG: 5 TABLET, FILM COATED ORAL at 20:49

## 2023-04-02 RX ADMIN — ALOGLIPTIN 12.5 MG: 12.5 TABLET, FILM COATED ORAL at 08:41

## 2023-04-02 RX ADMIN — METOPROLOL TARTRATE 25 MG: 25 TABLET, FILM COATED ORAL at 20:49

## 2023-04-02 RX ADMIN — ACYCLOVIR 400 MG: 200 CAPSULE ORAL at 08:42

## 2023-04-02 RX ADMIN — IPRATROPIUM BROMIDE AND ALBUTEROL SULFATE 3 ML: 2.5; .5 SOLUTION RESPIRATORY (INHALATION) at 19:25

## 2023-04-02 RX ADMIN — DOCUSATE SODIUM 100 MG: 100 CAPSULE, LIQUID FILLED ORAL at 08:42

## 2023-04-02 RX ADMIN — DOXYCYCLINE HYCLATE 100 MG: 100 CAPSULE ORAL at 20:50

## 2023-04-02 NOTE — PROGRESS NOTES
TRANSFER - OUT REPORT:    Verbal report given to Mike Magaña (name) on Sydnee Chioma  being transferred to room 468([address]) for routine progression of care       Report consisted of patients Situation, Background, Assessment and   Recommendations(SBAR). Information from the following report(s) SBAR, Kardex, ED Summary, MAR, Recent Results, Cardiac Rhythm Normal Sinus, and Dual Neuro Assessment was reviewed with the receiving nurse. Lines:   Peripheral IV 03/30/23 Anterior; Left Forearm (Active)   Site Assessment Clean, dry, & intact 04/01/23 2000   Phlebitis Assessment 0 04/01/23 2000   Infiltration Assessment 0 04/01/23 2000   Dressing Status Clean, dry, & intact 04/01/23 2000   Dressing Type Transparent;Tape 04/01/23 2000   Hub Color/Line Status Pink;Capped;Flushed 04/01/23 2000   Action Taken Open ports on tubing capped 04/01/23 2000   Alcohol Cap Used Yes 04/01/23 2000        Opportunity for questions and clarification was provided.       Patient transported with:   O2 @ 2 liters  Registered Nurse

## 2023-04-02 NOTE — PROGRESS NOTES
IMPRESSION:   Acute hypoxic respiratory failure now on 1 L nasal oxygen  A-fib with RVR improved  Acute exacerbation of COPD mild wheezing  Tobacco abuse  Acute kidney injury  Multiple myeloma with lytic lesions to the bone        RECOMMENDATIONS/PLAN:     73-year-old lady came in with shortness of breath  and  found to be in A-fib with RVR started on IV Cardizem and also received beta-blocker IV Cardizem was stopped  3/31  COPD better currently on nebulized DuoNeb 3 times daily Symbicort and oral prednisone  We will check overnight oximetry split study to see if can discontinue oxygen  History of multiple myeloma the patient has bony lytic lesions received chemotherapy  Chest x-ray 4/1 with improved pulmonary edema  Renal function improving Lasix now once a day       [x] High complexity decision making was performed  [x] See my orders for details  HPI  73-year-old lady came in because of difficulty breathing along with having cough past medical history of coronary artery disease multiple myeloma received chemotherapy yesterday and she was complaining of leg swelling shortness of breath and dyspnea she also had a history of COPD was with me regularly along with coronary artery disease, she is starting having wheezing using inhalers she is not on any oxygen at home so came to the hospital got admitted to ICU     PMH:  has a past medical history of Arthritis, Asthma, Cancer ([address] Chronic pain, COPD (chronic obstructive pulmonary disease) ([address] Depressive disorder (9/22/2020), Diabetes ([address] Essential hypertension (9/22/2020), GERD (gastroesophageal reflux disease), Hypothyroidism (9/22/2020), Malignant tumor of breast ([address] (9/22/2020), Osteoarthritis (9/22/2020), Pure hypercholesterolemia (9/22/2020), Sleep apnea, Thyroid disease (1980), and Type 2 diabetes mellitus without complication ([address] (9/36/9579).     PSH:   has a past surgical history that includes hx hymenectomy; hx heent; hx colonoscopy (10/20/2017); hx colonoscopy (2014); hx colonoscopy (2007); pr unlisted procedure breast (Right, 02/2016); pr mastectomy partial (Right, 2016); hx cholecystectomy (2013); and hx orthopaedic (Left, 07/07/2020). FHX: family history includes Cancer in her father; Diabetes in her brother, brother, brother, and sister; Heart Attack in her mother; Heart Disease in her brother, brother, and mother; Hypertension in her mother; Consuelo Limb in her brother; Lung Disease in her brother; Other in her father; Ovarian Cancer in her sister; Thyroid Disease in her mother. SHX:  reports that she has been smoking cigarettes. She has a 50.00 pack-year smoking history. She has never used smokeless tobacco. She reports that she does not currently use alcohol. She reports that she does not use drugs.     ALL:   Allergies   Allergen Reactions    Ppd Black Rubber Mix Other (comments)     Cellulitis    Erythromycin Base Unknown (comments)    Other Medication Other (comments)     TUBERCULOSIS  PT GETS CELLULITIS            Parafon Forte Dsc [Chlorzoxazone] Unknown (comments)    Ampicillin Unknown (comments) and Rash        MEDS:   [x] Reviewed - As Below   [] Not reviewed    Current Facility-Administered Medications   Medication    furosemide (LASIX) tablet 40 mg    apixaban (ELIQUIS) tablet 5 mg    metoprolol tartrate (LOPRESSOR) tablet 25 mg    doxycycline (VIBRAMYCIN) capsule 100 mg    albuterol-ipratropium (DUO-NEB) 2.5 MG-0.5 MG/3 ML    acyclovir (ZOVIRAX) capsule 400 mg    albuterol-ipratropium (DUO-NEB) 2.5 MG-0.5 MG/3 ML    anastrozole (ARIMIDEX) tablet 1 mg    aspirin delayed-release tablet 81 mg    cholecalciferol (VITAMIN D3) (5000 Units/125 mcg) tablet 5,000 Units    venlafaxine-SR (EFFEXOR-XR) capsule 150 mg    glipiZIDE (GLUCOTROL) tablet 5 mg    levothyroxine (SYNTHROID) tablet 100 mcg    hydrOXYzine pamoate (VISTARIL) capsule 25 mg    HYDROcodone-acetaminophen (NORCO) 5-325 mg per tablet 1 Tablet    pantoprazole (PROTONIX) tablet 40 mg    alogliptin (NESINA) tablet 12.5 mg    budesonide-formoteroL (SYMBICORT) 160-4.5 mcg/actuation HFA inhaler 2 Puff    And    [Held by provider] tiotropium bromide (SPIRIVA RESPIMAT) 2.5 mcg /actuation    insulin lispro (HUMALOG) injection    glucose chewable tablet 16 g    glucagon (GLUCAGEN) injection 1 mg    dextrose 10% infusion 0-250 mL    sodium chloride (NS) flush 5-40 mL    albuterol CONCENTRATE 2.5mg/0.5 mL neb soln    acetaminophen (TYLENOL) tablet 650 mg    ondansetron (ZOFRAN ODT) tablet 4 mg    docusate sodium (COLACE) capsule 100 mg      MAR reviewed and pertinent medications noted or modified as needed   Current Facility-Administered Medications   Medication    furosemide (LASIX) tablet 40 mg    apixaban (ELIQUIS) tablet 5 mg    metoprolol tartrate (LOPRESSOR) tablet 25 mg    doxycycline (VIBRAMYCIN) capsule 100 mg    albuterol-ipratropium (DUO-NEB) 2.5 MG-0.5 MG/3 ML    acyclovir (ZOVIRAX) capsule 400 mg    albuterol-ipratropium (DUO-NEB) 2.5 MG-0.5 MG/3 ML    anastrozole (ARIMIDEX) tablet 1 mg    aspirin delayed-release tablet 81 mg    cholecalciferol (VITAMIN D3) (5000 Units/125 mcg) tablet 5,000 Units    venlafaxine-SR (EFFEXOR-XR) capsule 150 mg    glipiZIDE (GLUCOTROL) tablet 5 mg    levothyroxine (SYNTHROID) tablet 100 mcg    hydrOXYzine pamoate (VISTARIL) capsule 25 mg    HYDROcodone-acetaminophen (NORCO) 5-325 mg per tablet 1 Tablet    pantoprazole (PROTONIX) tablet 40 mg    alogliptin (NESINA) tablet 12.5 mg    budesonide-formoteroL (SYMBICORT) 160-4.5 mcg/actuation HFA inhaler 2 Puff    And    [Held by provider] tiotropium bromide (SPIRIVA RESPIMAT) 2.5 mcg /actuation    insulin lispro (HUMALOG) injection    glucose chewable tablet 16 g    glucagon (GLUCAGEN) injection 1 mg    dextrose 10% infusion 0-250 mL    sodium chloride (NS) flush 5-40 mL    albuterol CONCENTRATE 2.5mg/0.5 mL neb soln    acetaminophen (TYLENOL) tablet 650 mg    ondansetron (ZOFRAN ODT) tablet 4 mg    docusate sodium (COLACE) capsule 100 mg      PMH:  has a past medical history of Arthritis, Asthma, Cancer ([facility] Chronic pain, COPD (chronic obstructive pulmonary disease) ([facility] Depressive disorder (9/22/2020), Diabetes ([facility] [facility] Essential hypertension (9/22/2020), GERD (gastroesophageal reflux disease), Hypothyroidism (9/22/2020), Malignant tumor of breast ([facility] (9/22/2020), Osteoarthritis (9/22/2020), Pure hypercholesterolemia (9/22/2020), Sleep apnea, Thyroid disease (1980), and Type 2 diabetes mellitus without complication ([facility] (1/24/3275). PSH:   has a past surgical history that includes hx hymenectomy; hx heent; hx colonoscopy (10/20/2017); hx colonoscopy (2014); hx colonoscopy (2007); pr unlisted procedure breast (Right, 02/2016); pr mastectomy partial (Right, 2016); hx cholecystectomy (2013); and hx orthopaedic (Left, 07/07/2020). FHX: family history includes Cancer in her father; Diabetes in her brother, brother, brother, and sister; Heart Attack in her mother; Heart Disease in her brother, brother, and mother; Hypertension in her mother; Cleta Castles in her brother; Lung Disease in her brother; Other in her father; Ovarian Cancer in her sister; Thyroid Disease in her mother. SHX:  reports that she has been smoking cigarettes. She has a 50.00 pack-year smoking history. She has never used smokeless tobacco. She reports that she does not currently use alcohol. She reports that she does not use drugs. ROS:A comprehensive review of systems was negative except for that written in the HPI. Hemodynamics:    CO:    CI:    CVP:    SVR:   PAP Systolic:    PAP Diastolic:    PVR:    AG76:        Ventilator Settings:      Mode Rate TV Press PEEP FiO2 PIP Min.  Vent               26 %              Vital Signs: Telemetry:    AFIB Intake/Output:   Visit Vitals  BP (!) 108/57 (BP 1 Location: Left upper arm)   Pulse 81   Temp 97.7 °F (36.5 °C)   Resp 20   Ht 5' 4\" (1.626 m)   Wt 103.6 kg (228 lb 6.3 oz)   SpO2 96%   BMI 39.20 kg/m²       Temp (24hrs), Av.5 °F (36.4 °C), Min:96.2 °F (35.7 °C), Max:98.1 °F (36.7 °C)        O2 Device: Nasal cannula O2 Flow Rate (L/min): 1 l/min       Wt Readings from Last 4 Encounters:   23 103.6 kg (228 lb 6.3 oz)   23 105.2 kg (232 lb)   22 103.9 kg (229 lb)   22 101.6 kg (224 lb)          Intake/Output Summary (Last 24 hours) at 2023 1604  Last data filed at 2023 0000  Gross per 24 hour   Intake 1484 ml   Output 800 ml   Net 684 ml         Last shift:      No intake/output data recorded. Last 3 shifts:  1901 -  0700  In: 1706 [P.O.:1706]  Out: 1500 [Urine:1500]       Physical Exam:     General:  female; on oxygen 1 L nasal cannula  HEENT: NCAT, normal oral mucosa  Eyes: anicteric; conjunctiva clear extraocular movements intact  Neck: no nodes,  trach midline; no accessory MM use. Chest: no deformity,   Cardiac: Irregular rhythm rate controlled  Lungs: Clear anterior and laterally with just a few rales posteriorly no wheezing  Abd: Soft positive bowel sounds no tenderness  Ext: no edema; no joint swelling;  No clubbing  : clear urine  Neuro: Alert awake oriented speech is clear moves all 4 extremities  Psych- no agitation, oriented to person;   Skin: warm, dry, no cyanosis;   Pulses: Brachial and radial pulses intact  Capillary: Normal capillary refill      DATA:    MAR reviewed and pertinent medications noted or modified as needed  MEDS:   Current Facility-Administered Medications   Medication    furosemide (LASIX) tablet 40 mg    apixaban (ELIQUIS) tablet 5 mg    metoprolol tartrate (LOPRESSOR) tablet 25 mg    doxycycline (VIBRAMYCIN) capsule 100 mg    albuterol-ipratropium (DUO-NEB) 2.5 MG-0.5 MG/3 ML    acyclovir (ZOVIRAX) capsule 400 mg    albuterol-ipratropium (DUO-NEB) 2.5 MG-0.5 MG/3 ML    anastrozole (ARIMIDEX) tablet 1 mg    aspirin delayed-release tablet 81 mg    cholecalciferol (VITAMIN D3) (5000 Units/125 mcg) tablet 5,000 Units venlafaxine-SR (EFFEXOR-XR) capsule 150 mg    glipiZIDE (GLUCOTROL) tablet 5 mg    levothyroxine (SYNTHROID) tablet 100 mcg    hydrOXYzine pamoate (VISTARIL) capsule 25 mg    HYDROcodone-acetaminophen (NORCO) 5-325 mg per tablet 1 Tablet    pantoprazole (PROTONIX) tablet 40 mg    alogliptin (NESINA) tablet 12.5 mg    budesonide-formoteroL (SYMBICORT) 160-4.5 mcg/actuation HFA inhaler 2 Puff    And    [Held by provider] tiotropium bromide (SPIRIVA RESPIMAT) 2.5 mcg /actuation    insulin lispro (HUMALOG) injection    glucose chewable tablet 16 g    glucagon (GLUCAGEN) injection 1 mg    dextrose 10% infusion 0-250 mL    sodium chloride (NS) flush 5-40 mL    albuterol CONCENTRATE 2.5mg/0.5 mL neb soln    acetaminophen (TYLENOL) tablet 650 mg    ondansetron (ZOFRAN ODT) tablet 4 mg    docusate sodium (COLACE) capsule 100 mg        Labs:    Recent Labs     04/02/23  1117 04/01/23  0400 03/31/23  0339   WBC 10.3 12.6* 9.5   HGB 10.3* 9.5* 9.9*    402* 467*       Recent Labs     04/01/23  0400 03/31/23  0339    135*   K 3.5 3.8    107   CO2 25 21   * 290*   BUN 23* 26*   CREA 1.79* 1.98*   CA 7.6*  7.6* 7.6*   MG 2.0 1.9   PHOS  --  3.4   ALB 2.6* 2.4*   ALT 12  --        Recent Labs     03/31/23  0356   PH 7.35   PCO2 38   PO2 65*   HCO3 21*   FIO2 28       Procalcitonin less than 0.05  Lab Results   Component Value Date/Time    Culture result: (A) 03/29/2023 05:56 PM     Staphylococcus epidermidis (Oxacillin resistant) growing in 2 of 4 bottles drawn No Site Indicated    Culture result:  03/29/2023 05:56 PM     (NOTE) [facility] CALLED TO FERMÍN SAHA, , AT 1351 ON 3/31/23 BY DG    Culture result: MRSA NOT PRESENT 06/23/2020 02:14 PM    Culture result:  06/23/2020 02:14 PM         Screening of patient nares for MRSA is for surveillance purposes and, if positive, to facilitate isolation considerations in high risk settings.  It is not intended for automatic decolonization interventions per se as regimens are not sufficiently effective to warrant routine use. Lab Results   Component Value Date/Time    TSH 3.360 06/01/2022 11:58 AM        Imaging:    Results from Hospital Encounter encounter on 03/29/23    XR CHEST PORT    Narrative  INDICATION: chf    EXAMINATION:  AP CHEST, PORTABLE    COMPARISON: 3/31/2023    FINDINGS: Single AP portable view of the chest demonstrates slightly  underinflated lungs. The cardiomediastinal silhouette is unchanged. Mild  pulmonary edema, improved in the interval. Bibasilar atelectasis. Chronic left  clavicle fracture, nonunited. Impression  Improved pulmonary edema. Results from [place] encounter on 06/09/22    CT CHEST WO CONT    Narrative  CT dose reduction was achieved through use of a standardized protocol tailored  for this examination and automatic exposure control for dose modulation. Noncontrast study is a follow-up from one year ago and shows mild emphysema and  subpleural fibrosis with some geographic aeration differences, matching findings  of previous. No evidence of nodule, edema or consolidation. Central airways are  open. No mediastinal or hilar adenopathy. Normal caliber aorta. No pleural pericardial  effusion. No significant upper abdominal or chest wall finding    Impression  No change.  No active findings      3/31 she is on 2 L nasal cannula Sleepy this a.m. worsening of renal function we will get nephrology consult chest x-ray shows CHF pulmonary edema A-fib with RVR on Cardizem discontinue Solu-Medrol start patient on prednisone  4/1 alert awake feeling better on nasal cannula off IV Cardizem, culture was positive for staph most likely contaminant coagulase-negative, on doxycycline  4/2 awake alert feels much better oxygen down to 1 L we will check overnight oximetry split study  Isamar Hilton MD

## 2023-04-02 NOTE — PROGRESS NOTES
Problem: Diabetes Self-Management  Goal: *Disease process and treatment process  Description: Define diabetes and identify own type of diabetes; list 3 options for treating diabetes. Outcome: Progressing Towards Goal  Goal: *Incorporating nutritional management into lifestyle  Description: Describe effect of type, amount and timing of food on blood glucose; list 3 methods for planning meals. Outcome: Progressing Towards Goal  Goal: *Incorporating physical activity into lifestyle  Description: State effect of exercise on blood glucose levels. Outcome: Progressing Towards Goal  Goal: *Developing strategies to promote health/change behavior  Description: Define the ABC's of diabetes; identify appropriate screenings, schedule and personal plan for screenings. Outcome: Progressing Towards Goal  Goal: *Using medications safely  Description: State effect of diabetes medications on diabetes; name diabetes medication taking, action and side effects. Outcome: Progressing Towards Goal  Goal: *Monitoring blood glucose, interpreting and using results  Description: Identify recommended blood glucose targets  and personal targets. Outcome: Progressing Towards Goal  Goal: *Prevention, detection, treatment of acute complications  Description: List symptoms of hyper- and hypoglycemia; describe how to treat low blood sugar and actions for lowering  high blood glucose level. Outcome: Progressing Towards Goal  Goal: *Prevention, detection and treatment of chronic complications  Description: Define the natural course of diabetes and describe the relationship of blood glucose levels to long term complications of diabetes.   Outcome: Progressing Towards Goal  Goal: *Developing strategies to address psychosocial issues  Description: Describe feelings about living with diabetes; identify support needed and support network  Outcome: Progressing Towards Goal  Goal: *Insulin pump training  Outcome: Progressing Towards Goal  Goal: *Sick day guidelines  Outcome: Progressing Towards Goal  Goal: *Patient Specific Goal (EDIT GOAL, INSERT TEXT)  Outcome: Progressing Towards Goal     Problem: Patient Education: Go to Patient Education Activity  Goal: Patient/Family Education  Outcome: Progressing Towards Goal     Problem: Pressure Injury - Risk of  Goal: *Prevention of pressure injury  Description: Document Faisal Scale and appropriate interventions in the flowsheet. Outcome: Progressing Towards Goal  Note: Pressure Injury Interventions:       Moisture Interventions: Absorbent underpads, Minimize layers    Activity Interventions: Increase time out of bed, PT/OT evaluation    Mobility Interventions: Assess need for specialty bed, Pressure redistribution bed/mattress (bed type), PT/OT evaluation    Nutrition Interventions: Document food/fluid/supplement intake    Friction and Shear Interventions: Minimize layers, Apply protective barrier, creams and emollients                Problem: Patient Education: Go to Patient Education Activity  Goal: Patient/Family Education  Outcome: Progressing Towards Goal     Problem: Falls - Risk of  Goal: *Absence of Falls  Description: Document Guido Fall Risk and appropriate interventions in the flowsheet.   Outcome: Progressing Towards Goal  Note: Fall Risk Interventions:  Mobility Interventions: Bed/chair exit alarm         Medication Interventions: Bed/chair exit alarm, Patient to call before getting OOB, Teach patient to arise slowly    Elimination Interventions: Bed/chair exit alarm, Call light in reach, Toilet paper/wipes in reach, Patient to call for help with toileting needs              Problem: Patient Education: Go to Patient Education Activity  Goal: Patient/Family Education  Outcome: Progressing Towards Goal     Problem: Discharge Planning  Goal: *Discharge to safe environment  Outcome: Progressing Towards Goal  Goal: *Knowledge of medication management  Outcome: Progressing Towards Goal  Goal: *Knowledge of discharge instructions  Outcome: Progressing Towards Goal     Problem: Patient Education: Go to Patient Education Activity  Goal: Patient/Family Education  Outcome: Progressing Towards Goal     Problem: Chronic Obstructive Pulmonary Disease (COPD)  Goal: *Oxygen saturation during activity within specified parameters  Outcome: Progressing Towards Goal  Goal: *Able to remain out of bed as prescribed  Outcome: Progressing Towards Goal  Goal: *Absence of hypoxia  Outcome: Progressing Towards Goal  Goal: *Optimize nutritional status  Outcome: Progressing Towards Goal

## 2023-04-02 NOTE — PROGRESS NOTES
Patient is out of ICU.   Clinically improved  Cardiology input noted  No chemistry although it was requested  Will order renal panel   Our service will re evaluate on Monday

## 2023-04-02 NOTE — PROGRESS NOTES
CARDIOLOGY PROGRESS NOTE      Patient Name: Dietra Dancer  Age: 74 y.o. Gender:female  :1950  MRN: [id number]    Patient seen and examined. This is a patient who is followed for new onset atrial fibrillation. Resting in bed, feeling much better. Some shortness of breath continues, leg swelling improving. No other complaints reported. Telemetry reviewed, converted back to sinus rhythm    Pertinent review of systems items noted above, all other systems are negative. Current medications reviewed. Physical Examination  Allergies   Allergen Reactions    Ppd Black Rubber Mix Other (comments)     Cellulitis    Erythromycin Base Unknown (comments)    Other Medication Other (comments)     TUBERCULOSIS  PT GETS CELLULITIS            Parafon Forte Dsc [Chlorzoxazone] Unknown (comments)    Ampicillin Unknown (comments) and Rash     Visit Vitals  /60 (BP 1 Location: Right upper arm, BP Patient Position: At rest)   Pulse 72   Temp 97.7 °F (36.5 °C)   Resp 20   Ht 5' 4\" (1.626 m)   Wt 103.6 kg (228 lb 6.3 oz)   SpO2 98%   BMI 39.20 kg/m²     Vital signs are stable. No apparent distress. Heart has a regular rate and rhythm. Normal S1, S2, soft murmur  Lungs are diminished bilaterally. Abdomen is soft, nontender, normal bowel sounds. Extremities have mild edema. Skin is dry and warm  Normal affect    Labs reviewed: Lab results reviewed. For significant abnormal values and values requiring intervention, see assessment and plan.   Recent Results (from the past 12 hour(s))   GLUCOSE, POC    Collection Time: 23  7:22 AM   Result Value Ref Range    Glucose (POC) 55 (L) 65 - 100 mg/dL    Performed by Alesia Michelle    GLUCOSE, POC    Collection Time: 23  7:51 AM   Result Value Ref Range    Glucose (POC) 61 (L) 65 - 100 mg/dL    Performed by [address] [place] [address] St, POC    Collection Time: 23 11:08 AM   Result Value Ref Range    Glucose (POC) 88 65 - 100 mg/dL    Performed by Marija Armendariz Atrial fibrillation, new onset rate elevated on admission  Currently in SR, gave her my card for follow up with me for afib management in 1-2 weeks. Agree with lopressor, on eliquis,   Follow up with Dr Cyn Dominguez with [facility] for [employer]. Shortness of breath, likely multifactorial given COPD and AF RVR  Back to sinus rhythm  Echo with preserved EF, mild MR  BP soft, will change to po lasix, strict I&O and weights  Venous duplexes negative for DVT  Followed by Dr. Christiano Caputo for COPD  Nonobstructive CAD per CCTA, no chest pain reported, continue medical management with ASA  Multiple myeloma, hematology following appreciate input  Tobacco abuse, needs to quit    Please do not hesitate to call me if additional questions arise.     Luis Dominguez, DO  4/2/2023

## 2023-04-02 NOTE — PROGRESS NOTES
Progress Note    Patient: Destin Lackey MRN: [id number]  SSN: [id number]    YOB: 1950  Age: 74 y.o. Sex: female      Admit Date: 3/29/2023    LOS: 4 days     Subjective:   Patient followed for positive blood cultures with coagulase negative Staphylococci felt to be contaminant. Yesterday her WBC and CRP were elevated, however, today her WBC is normal again and CRP has decreased without IV Vancomycin. Blood cultures repeated this morning. Objective:     Vitals:    04/02/23 0717 04/02/23 0754 04/02/23 1103 04/02/23 1152   BP: 106/62  111/60    Pulse: 76 78 72 71   Resp: 18  20    Temp: (!) 96.2 °F (35.7 °C)  97.7 °F (36.5 °C)    SpO2: 98% 98% 98%    Weight:       Height:            Intake and Output:  Current Shift: No intake/output data recorded. Last three shifts: 03/31 1901 - 04/02 0700  In: 1706 [P.O.:1706]  Out: 1500 [Urine:1500]    Physical Exam:   Vitals and nursing note reviewed. Exam conducted with a chaperone present (). Constitutional:       General: She is not in acute distress. Appearance: She is obese. She is ill-appearing. HENT:      Head: Normocephalic and atraumatic. Right Ear: External ear normal.      Left Ear: External ear normal.      Nose: Nose normal.      Comments: Nasal cannula 2 L/min     Mouth/Throat:      Pharynx: Oropharynx is clear. Eyes:      Pupils: Pupils are equal, round, and reactive to light. Cardiovascular:      Rate and Rhythm: Normal rate and regular rhythm. Heart sounds: No murmur heard. Pulmonary:      Effort: Pulmonary effort is normal.      Breath sounds: Normal breath sounds. Abdominal:      General: Bowel sounds are normal. There is no distension. Palpations: Abdomen is soft. Tenderness: There is no abdominal tenderness. Genitourinary:     Comments: No Sharpe catheter  Musculoskeletal:      Cervical back: Neck supple. Right lower leg: No edema. Left lower leg: No edema.    Skin:     Findings: No rash. Neurological:      General: No focal deficit present. Mental Status: She is alert and oriented to person, place, and time. Psychiatric:         Mood and Affect: Mood normal.         Behavior: Behavior normal.         Thought Content: Thought content normal.         Judgment: Judgment normal    Lab/Data Review:     WBC 10,300     CRP 0.91 <1.70  Procal <0.05    MRSA nasal PCR Negative    Blood cultures (3/29) Staphylococcus species, coagulase negative (2 of 4 bottles)  Blood cultures (4/1) in process      CXR (4/1) Improved pulmonary edema    Assessment:     Active Problems:    COPD with acute exacerbation ([facility]) (3/29/2023)  Positive blood cultures with Coagulase negative staphylococcus species, consistent with contaminant  Leukocytosis and elevated CRP  Acute exacerbation of COPD  Acute hypoxic respiratory failure  Multiple myeloma     Comment:  WBC again normal and CRP decreasing without starting Vancomycin. Repeat blood culture negative so far.      Plan:   No antibiotics recommended at this timee  In am, repeat  CRP  Follow-up repeat blood cultures      Signed By: Jhonathan Murphy MD     April 2, 2023

## 2023-04-02 NOTE — PROGRESS NOTES
Problem: Mobility Impaired (Adult and Pediatric)  Goal: *Acute Goals and Plan of Care (Insert Text)  Description: I with LE HEP x7 days  Mod I with bed mob x 7 days  Mod I with all transfers x7 days  Amb 50-75ft with LRAD and SBAx1 x7 days without LOB or c/o SOB    Pt stated goal: to go home  Outcome: Not Met    PHYSICAL THERAPY EVALUATION  Patient: Moises Goodrich (50 y.o. female)  Date: 4/2/2023  Primary Diagnosis: COPD with acute exacerbation ([facility] [address] [J44.1]       Precautions: In place during session: pt on 2L O2 via NC    ASSESSMENT    Pt is a 74 y.o. female admitted to hospital with afib with RVR/SOB/COPD presents to PT with decreased bed mob, transfers, LE strength, gt, activity tolerance, and overall functional mobility. Pt semi supine in bed upon PT arrival, agreeable to evaluation. Pt A&O x 4. PLOF listed below. Pt is [place] at times. Based on the objective data described below, the patient presents with generalized weakness, impaired functional mobility, impaired amb, impaired balance, and decreased overall functional mobility and activity tolerance. (See below for objective details and assist levels). Overall pt tolerated session fair today with overall SBA with bed mob and transfers and CGA for ambulation. Pt demos unsteady gt at times and shuffling gt pattern, would likely benefit from using AD next visit to ensure safety, pt has just started using rollator at home. Pt is a retired nurse. No LOB during amb, assisted pt to and from bathroom, pt was observed reaching out to hold onto things while she was ambulating in room. Pt amb on 2 L O2 via NC, SpO2 97% post amb. Pt was educated to use AD especially when she goes home due to weakness and decreased balance, pt agreeable. No SOB noted, slight fatigue reported post amb. Pt will benefit from continued skilled PT to address above deficits and return to PLOF.  Current PT DC recommendation Home with Family Care once medically appropriate, pt does not want HHPT at this time but would likely be beneficial if pt agrees. PLAN :  Recommendations and Planned Interventions: bed mobility training, transfer training, gait training, therapeutic exercises, patient and family training/education, and therapeutic activities      Recommend for staff: Out of bed to chair for meals, Encourage HEP in prep for ADLs/mobility, Amb to bathroom for toileting with gt belt and AD, and Use of bed/chair alarm for safety    Frequency/Duration: Patient will be followed by physical therapy:  3-5x/week to address goals. Recommendation for discharge: (in order for the patient to meet his/her long term goals)  Current PT DC recommendation Home with Family Care once medically appropriate, pt does not want HHPT at this time but would likely be beneficial if pt agrees.                SUBJECTIVE:   Patient semi supine in bed upon PT arrival, agreeable to work with PT    OBJECTIVE DATA SUMMARY:   HISTORY:    Past Medical History:   Diagnosis Date    Arthritis     Asthma     Cancer ([facility] [address]     BREAST CANCER RIGHT BREAST LUMPECTOMY     Chronic pain     COPD (chronic obstructive pulmonary disease) ([facility] [address]     Depressive disorder 9/22/2020    Diabetes ([facility] [address]     Essential hypertension 9/22/2020    GERD (gastroesophageal reflux disease)     Hypothyroidism 9/22/2020    Malignant tumor of breast ([facility] [address] 9/22/2020    Osteoarthritis 9/22/2020    Pure hypercholesterolemia 9/22/2020    Sleep apnea     Thyroid disease 1980    REMOVED     Type 2 diabetes mellitus without complication ([facility] [address] 3/30/9021     Past Surgical History:   Procedure Laterality Date    HX CHOLECYSTECTOMY  2013    HX COLONOSCOPY  10/20/2017    HX COLONOSCOPY  2014    HX COLONOSCOPY  2007    HX HEENT      HX HYMENECTOMY      HX ORTHOPAEDIC Left 07/07/2020    left rotator cuff    MT MASTECTOMY PARTIAL Right 2016    MT UNLISTED PROCEDURE BREAST Right 02/2016    LUMPECTOMY        Home Situation  Home Environment: Private residence  # Steps to Enter: 4  Rails to Enter: Yes  Hand Rails : Bilateral  Wheelchair Ramp: Yes  One/Two Story Residence: One story  Living Alone: No  Support Systems: Spouse/Significant Other  Patient Expects to be Discharged to[de-identified] Home with family assistance  Current DME Used/Available at Home: [employer]    Personal factors and/or comorbidities impacting plan of care:     Home Situation  Home Environment: Private residence  # Steps to Enter: 4  Rails to Enter: Yes  Hand Rails : Bilateral  Wheelchair Ramp: Yes  One/Two Story Residence: One story  Living Alone: No  Support Systems: Spouse/Significant Other  Patient Expects to be Discharged to[de-identified] Home with family assistance  Current DME Used/Available at Home: zan Ryan    EXAMINATION/PRESENTATION/DECISION MAKING:   Critical Behavior:  Neurologic State: Alert  Orientation Level: Oriented X4  Cognition: Follows commands       Skin:  intact where exposed    Edema: none noted    Range Of Motion:  AROM: Within functional limits   B LE                         Strength:    Strength: Generally decreased, functional  Grossly 4-/5 B LE       Tone & Sensation:     Sensation: Intact       Functional Mobility:  Bed Mobility:     Supine to Sit: Stand-by assistance  Sit to Supine: Stand-by assistance  Scooting: Stand-by assistance    Transfers:  Sit to Stand: Stand-by assistance  Stand to Sit: Stand-by assistance  Stand Pivot Transfers: Stand-by assistance          Balance:   Sitting: Intact  Standing: Impaired  Standing - Static: Fair  Standing - Dynamic : Fair    Ambulation/Gait Training:  Distance (ft): 25 Feet (ft)  Assistive Device: Gait belt  Ambulation - Level of Assistance: Contact guard assistance         Functional Measure:  [employer] 6 Clicks         Basic Mobility Inpatient Short Form  How much difficulty does the patient currently have. .. Unable A Lot A Little None   1. Turning over in bed (including adjusting bedclothes, sheets and blankets)?    [] 1 [] 2   [x] 3   [] 4   2. Sitting down on and standing up from a chair with arms ( e.g., wheelchair, bedside commode, etc.)   [] 1   [] 2   [x] 3   [] 4   3. Moving from lying on back to sitting on the side of the bed? [] 1   [] 2   [x] 3   [] 4          How much help from another person does the patient currently need. .. Total A Lot A Little None   4. Moving to and from a bed to a chair (including a wheelchair)? [] 1   [] 2   [x] 3   [] 4   5. Need to walk in hospital room? [] 1   [] 2   [x] 3   [] 4   6. Climbing 3-5 steps with a railing? [] 1   [] 2   [x] 3   [] 4   © 2007, Trustees of [facility], under license to [employer]. All rights reserved     Score:  Initial: 18 Most Recent: X (Date: -- )   Interpretation of Tool:  Represents activities that are increasingly more difficult (i.e. Bed mobility, Transfers, Gait). Score 24 23 22-20 19-15 14-10 9-7 6   Modifier CH CI CJ CK CL CM CN           Physical Therapy Evaluation Charge Determination   History Examination Presentation Decision-Making   MEDIUM  Complexity : 1-2 comorbidities / personal factors will impact the outcome/ POC  MEDIUM Complexity : 3 Standardized tests and measures addressing body structure, function, activity limitation and / or participation in recreation  LOW Complexity : Stable, uncomplicated  Other Functional Measure [facility] 6 MED      Based on the above components, the patient evaluation is determined to be of the following complexity level: LOW     Pain Rating:  No c/o pain during session    Activity Tolerance:   Fair    After treatment patient left in no apparent distress:   Bed locked and in lowest position Supine in bed, Call bell within reach, Bed / chair alarm activated, and Side rails x 3           COMMUNICATION/EDUCATION:   The patients plan of care was discussed with: Registered nurse. Fall prevention education was provided and the patient/caregiver indicated understanding.  and Patient/family agree to work toward stated goals and plan of care.       Thank you for this referral.  Earline Vidal   Time Calculation: 20 mins

## 2023-04-02 NOTE — PROGRESS NOTES
Blood sugar 71mg/dL. Patient is conscious and is not on NPO. Gave a can of soda for hypoglycemia. Repeat accucheck of 84mg/dL.

## 2023-04-02 NOTE — PROGRESS NOTES
Hospitalist Progress Note    NAME: Ruthann Bates   :  1950   MRN:  [id number]     Subjective:   Daily Progress Note: 2023 1:55 PM    Chief complaint: Worsening shortness of breath    3/30/2023:  Patient seen and examined in ED, chart was reviewed. Spoke with  at bedside. Patient progressively getting short of breath for last several days  She was using 's home oxygen. She is noted to have A-fib with RVR on Cardizem drip,   cardiology on case recommending anticoagulation if okay with hematology  Patient continues to have intermittent palpitations and dyspnea on exertion. Patient being transferred to ICU level of care at this time. Bilateral lower extremity Dopplers noted to be negative. 3/31/23: Feels much better. Breathing better. No pain, n/v. Converted to Sinus. Still on Cardizem gtt --> titrating down. Echo with good EF.   2023; Not on Cardizem drip. No pain reported. Still on oxygen with nasal cannula. From ICU to 2W  States that she lives with her  who is 100% disabled . Recounts experience of rash with Revlimid.  -----------------  23- seen and examined. Leg swelling is gone down. Less short of breath. Eating well. Voiding well.          Current Facility-Administered Medications   Medication Dose Route Frequency    furosemide (LASIX) tablet 40 mg  40 mg Oral DAILY    apixaban (ELIQUIS) tablet 5 mg  5 mg Oral BID    metoprolol tartrate (LOPRESSOR) tablet 25 mg  25 mg Oral Q12H    dilTIAZem (CARDIZEM) 125 mg in 0.9% sodium chloride 125 mL ([id number])  0-15 mg/hr IntraVENous TITRATE    doxycycline (VIBRAMYCIN) capsule 100 mg  100 mg Oral Q12H    albuterol-ipratropium (DUO-NEB) 2.5 MG-0.5 MG/3 ML  3 mL Nebulization Q2H PRN    acyclovir (ZOVIRAX) capsule 400 mg  400 mg Oral BID    albuterol-ipratropium (DUO-NEB) 2.5 MG-0.5 MG/3 ML  3 mL Nebulization TID RT    anastrozole (ARIMIDEX) tablet 1 mg  1 mg Oral DAILY    aspirin delayed-release tablet 81 mg  81 mg Oral DAILY    cholecalciferol (VITAMIN D3) (5000 Units/125 mcg) tablet 5,000 Units  5,000 Units Oral DAILY    venlafaxine-SR (EFFEXOR-XR) capsule 150 mg  150 mg Oral DAILY    glipiZIDE (GLUCOTROL) tablet 5 mg  5 mg Oral ACB    levothyroxine (SYNTHROID) tablet 100 mcg  100 mcg Oral ACB    hydrOXYzine pamoate (VISTARIL) capsule 25 mg  25 mg Oral QHS PRN    HYDROcodone-acetaminophen (NORCO) 5-325 mg per tablet 1 Tablet  1 Tablet Oral QID PRN    pantoprazole (PROTONIX) tablet 40 mg  40 mg Oral ACB    alogliptin (NESINA) tablet 12.5 mg  12.5 mg Oral DAILY    budesonide-formoteroL (SYMBICORT) 160-4.5 mcg/actuation HFA inhaler 2 Puff  2 Puff Inhalation BID RT    And    [Held by provider] tiotropium bromide (SPIRIVA RESPIMAT) 2.5 mcg /actuation  2 Puff Inhalation DAILY    insulin lispro (HUMALOG) injection   SubCUTAneous AC&HS    glucose chewable tablet 16 g  4 Tablet Oral PRN    glucagon (GLUCAGEN) injection 1 mg  1 mg IntraMUSCular PRN    dextrose 10% infusion 0-250 mL  0-250 mL IntraVENous PRN    sodium chloride (NS) flush 5-40 mL  5-40 mL IntraVENous PRN    albuterol CONCENTRATE 2.5mg/0.5 mL neb soln  2.5 mg Nebulization Q4H PRN    acetaminophen (TYLENOL) tablet 650 mg  650 mg Oral Q4H PRN    ondansetron (ZOFRAN ODT) tablet 4 mg  4 mg Oral Q6H PRN    docusate sodium (COLACE) capsule 100 mg  100 mg Oral BID          Objective:     Visit Vitals  /62 (BP 1 Location: Right upper arm)   Pulse 78   Temp (!) 96.2 °F (35.7 °C)   Resp 18   Ht 5' 4\" (1.626 m)   Wt 103.6 kg (228 lb 6.3 oz)   SpO2 98%   BMI 39.20 kg/m²    O2 Flow Rate (L/min): 2 l/min O2 Device: Nasal cannula    Temp (24hrs), Av.6 °F (36.4 °C), Min:96.2 °F (35.7 °C), Max:98.1 °F (36.7 °C)      Physical Exam  Vitals and nursing note reviewed. Constitutional:       Appearance: Normal appearance. She is normal weight. HENT:      Head: Normocephalic and atraumatic.       Nose: Nose normal.      Mouth/Throat:      Mouth: Mucous membranes are moist.      Pharynx: Oropharynx is clear. Eyes:      Conjunctiva/sclera: Conjunctivae normal.   Cardiovascular:      Rate and Rhythm: Normal rate and regular rhythm. Pulses: Normal pulses. Heart sounds: Normal heart sounds. Pulmonary:      Effort: Pulmonary effort is normal.      Breath sounds: Normal breath sounds. Abdominal:      General: Abdomen is flat. Palpations: Abdomen is soft. Musculoskeletal:      Right lower leg: Edema present. Left lower leg: Edema present. Skin:     General: Skin is warm and dry. Coloration: Skin is not pale. Findings: No erythema. Neurological:      General: No focal deficit present. Mental Status: She is alert and oriented to person, place, and time. Psychiatric:         Mood and Affect: Mood normal.         Behavior: Behavior normal.      Edema decreased.   On oxygen by nasal cannula  Data Review    Recent Results (from the past 24 hour(s))   EKG, 12 LEAD, INITIAL    Collection Time: 03/29/23  5:36 PM   Result Value Ref Range    Ventricular Rate 87 BPM    Atrial Rate 87 BPM    P-R Interval 164 ms    QRS Duration 76 ms    Q-T Interval 368 ms    QTC Calculation (Bezet) 442 ms    Calculated P Axis 59 degrees    Calculated R Axis 85 degrees    Calculated T Axis 50 degrees    Diagnosis       Normal sinus rhythm  Low voltage QRS  Septal infarct , age undetermined  Abnormal ECG    Confirmed by [facility] [place] ([id number]) on 3/30/2023 12:58:25 PM     CBC WITH AUTOMATED DIFF    Collection Time: 03/29/23  5:56 PM   Result Value Ref Range    WBC 10.9 3.6 - 11.0 K/uL    RBC 4.06 3.80 - 5.20 M/uL    HGB 11.4 (L) 11.5 - 16.0 g/dL    HCT 35.2 35.0 - 47.0 %    MCV 86.7 80.0 - 99.0 FL    MCH 28.1 26.0 - 34.0 PG    MCHC 32.4 30.0 - 36.5 g/dL    RDW 15.7 (H) 11.5 - 14.5 %    PLATELET 937 (H) 901 - 400 K/uL    MPV 10.7 8.9 - 12.9 FL    NRBC 0.0 0.0  WBC    ABSOLUTE NRBC 0.00 0.00 - 0.01 K/uL    NEUTROPHILS 75 32 - 75 %    LYMPHOCYTES 13 12 - 49 % MONOCYTES 9 5 - 13 %    EOSINOPHILS 2 0 - 7 %    BASOPHILS 1 0 - 1 %    IMMATURE GRANULOCYTES 0 0 - 0.5 %    ABS. NEUTROPHILS 8.1 (H) 1.8 - 8.0 K/UL    ABS. LYMPHOCYTES 1.4 0.8 - 3.5 K/UL    ABS. MONOCYTES 1.0 0.0 - 1.0 K/UL    ABS. EOSINOPHILS 0.3 0.0 - 0.4 K/UL    ABS. BASOPHILS 0.1 0.0 - 0.1 K/UL    ABS. IMM. GRANS. 0.0 0.00 - 0.04 K/UL    DF AUTOMATED     METABOLIC PANEL, COMPREHENSIVE    Collection Time: 03/29/23  5:56 PM   Result Value Ref Range    Sodium 139 136 - 145 mmol/L    Potassium 4.0 3.5 - 5.1 mmol/L    Chloride 110 (H) 97 - 108 mmol/L    CO2 22 21 - 32 mmol/L    Anion gap 7 5 - 15 mmol/L    Glucose 96 65 - 100 mg/dL    BUN 18 6 - 20 mg/dL    Creatinine 1.61 (H) 0.55 - 1.02 mg/dL    BUN/Creatinine ratio 11 (L) 12 - 20      eGFR 33 (L) >60 ml/min/1.73m2    Calcium 8.2 (L) 8.5 - 10.1 mg/dL    Bilirubin, total 0.3 0.2 - 1.0 mg/dL    AST (SGOT) 12 (L) 15 - 37 U/L    ALT (SGPT) 15 12 - 78 U/L    Alk.  phosphatase 118 (H) 45 - 117 U/L    Protein, total 6.9 6.4 - 8.2 g/dL    Albumin 2.9 (L) 3.5 - 5.0 g/dL    Globulin 4.0 2.0 - 4.0 g/dL    A-G Ratio 0.7 (L) 1.1 - 2.2     LACTIC ACID    Collection Time: 03/29/23  5:56 PM   Result Value Ref Range    Lactic acid 1.4 0.4 - 2.0 mmol/L   CULTURE, BLOOD, PAIRED    Collection Time: 03/29/23  5:56 PM    Specimen: Blood   Result Value Ref Range    Special Requests: No Special Requests      Culture result: No growth after 4 hours     NT-PRO BNP    Collection Time: 03/29/23  5:56 PM   Result Value Ref Range    NT pro-BNP 2,359 (H) <125 pg/mL   TROPONIN-HIGH SENSITIVITY    Collection Time: 03/29/23  5:56 PM   Result Value Ref Range    Troponin-High Sensitivity 8 0 - 51 ng/L   GLUCOSE, POC    Collection Time: 03/29/23 10:33 PM   Result Value Ref Range    Glucose (POC) 107 (H) 65 - 100 mg/dL    Performed by Irma Davies    HEMOGLOBIN A1C WITH EAG    Collection Time: 03/30/23  3:06 AM   Result Value Ref Range    Hemoglobin A1c 8.1 (H) 4.0 - 5.6 %    Est. average glucose 186 mg/dL   RENAL FUNCTION PANEL    Collection Time: 03/30/23  3:06 AM   Result Value Ref Range    Sodium 141 136 - 145 mmol/L    Potassium 4.2 3.5 - 5.1 mmol/L    Chloride 113 (H) 97 - 108 mmol/L    CO2 18 (L) 21 - 32 mmol/L    Anion gap 10 5 - 15 mmol/L    Glucose 164 (H) 65 - 100 mg/dL    BUN 17 6 - 20 mg/dL    Creatinine 1.50 (H) 0.55 - 1.02 mg/dL    BUN/Creatinine ratio 11 (L) 12 - 20      eGFR 36 (L) >60 ml/min/1.73m2    Calcium 7.5 (L) 8.5 - 10.1 mg/dL    Phosphorus 3.2 2.6 - 4.7 mg/dL    Albumin 2.5 (L) 3.5 - 5.0 g/dL   TROPONIN-HIGH SENSITIVITY    Collection Time: 03/30/23  3:06 AM   Result Value Ref Range    Troponin-High Sensitivity 6 0 - 51 ng/L   URINALYSIS W/ RFLX MICROSCOPIC    Collection Time: 03/30/23  3:19 AM   Result Value Ref Range    Color Yellow/Straw      Appearance Clear Clear      Specific gravity 1.005 1.003 - 1.030      pH (UA) 5.0 5.0 - 8.0      Protein Negative Negative mg/dL    Glucose >300 (A) Negative mg/dL    Ketone 5 (A) Negative mg/dL    Bilirubin Negative Negative      Blood Small (A) Negative      Urobilinogen 0.1 0.1 - 1.0 EU/dL    Nitrites Negative Negative      Leukocyte Esterase Negative Negative      WBC 0-4 0 - 4 /hpf    RBC 0-5 0 - 5 /hpf    Bacteria 1+ (A) Negative /hpf   URINE MICROSCOPIC    Collection Time: 03/30/23  3:19 AM   Result Value Ref Range    WBC 0-4 0 - 4 /hpf    RBC 0-5 0 - 5 /hpf    Bacteria 1+ (A) Negative /hpf   EKG, 12 LEAD, INITIAL    Collection Time: 03/30/23  6:10 AM   Result Value Ref Range    Ventricular Rate 151 BPM    Atrial Rate 153 BPM    QRS Duration 84 ms    Q-T Interval 326 ms    QTC Calculation (Bezet) 516 ms    Calculated R Axis 25 degrees    Calculated T Axis -171 degrees    Diagnosis       Poor data quality, interpretation may be adversely affected  Atrial fibrillation with rapid ventricular response with premature   ventricular or aberrantly conducted complexes  Low voltage QRS  Cannot rule out Anteroseptal infarct (cited on or before 29-MAR-2023)  Abnormal ECG  When compared with ECG of 29-MAR-2023 17:36, (Unconfirmed)  Atrial fibrillation has replaced Sinus rhythm  Vent. rate has increased BY  64 BPM  Questionable change in initial forces of Anterior leads  Nonspecific T wave abnormality no longer evident in Anterior leads  Confirmed by [facility] ([id number]) on 3/30/2023 12:58:16 PM     TROPONIN-HIGH SENSITIVITY    Collection Time: 03/30/23  6:33 AM   Result Value Ref Range    Troponin-High Sensitivity 8 0 - 51 ng/L   GLUCOSE, POC    Collection Time: 03/30/23  8:23 AM   Result Value Ref Range    Glucose (POC) 176 (H) 65 - 100 mg/dL    Performed by [facility]    EKG, 12 LEAD, INITIAL    Collection Time: 03/30/23 10:32 AM   Result Value Ref Range    Ventricular Rate 129 BPM    Atrial Rate 138 BPM    QRS Duration 86 ms    Q-T Interval 306 ms    QTC Calculation (Bezet) 448 ms    Calculated R Axis -15 degrees    Calculated T Axis -129 degrees    Diagnosis       Atrial fibrillation with rapid ventricular response with premature   ventricular or aberrantly conducted complexes  Low voltage QRS  Cannot rule out Anteroseptal infarct , age undetermined  ST & T wave abnormality, consider inferior ischemia  Abnormal ECG    Confirmed by [facility] ([id number]) on 3/30/2023 12:58:43 PM     GLUCOSE, POC    Collection Time: 03/30/23 11:57 AM   Result Value Ref Range    Glucose (POC) 170 (H) 65 - 100 mg/dL    Performed by [facility]      No results found for this visit on 03/29/23.   All Micro Results       Procedure Component Value Units Date/Time    CULTURE, BLOOD, PAIRED [[id number]]  (Abnormal)  (Susceptibility) Collected: 03/29/23 9913    Order Status: Completed Specimen: Blood Updated: 04/02/23 0690     Special Requests: No Special Requests        Culture result:       Staphylococcus epidermidis (Oxacillin resistant) growing in 2 of 4 bottles drawn No Site Indicated            (NOTE) [facility] CALLED TO FERMÍN SAHA, , AT 1351 ON 3/31/23 BY DG    CULTURE, BLOOD, PAIRED [[id number]] Collected: 04/01/23 0400    Order Status: Sent Specimen: Blood Updated: 04/01/23 0447    BLOOD CULTURE ID PANEL [[id number]]  (Abnormal) Collected: 03/29/23 1756    Order Status: Completed Specimen: Blood Updated: 03/31/23 1441     Acc. no. from Micro Order Blood Culture Bottle        Enterococcus faecalis Not detected     Enterococcus faecium Not detected     Listeria monocytogenes Not detected     Staphylococcus Detected        Staphylococcus aureus Not detected     Staph epidermidis Detected        Staph lugdunensis Not detected     Streptococcus Not detected     Streptococcus agalactiae (Group B) Not detected     Streptococcus pneumoniae Not detected     Streptococcus pyogenes (Group A) Not detected     Acinetobacter calcoaceticus-baumanii complex Not detected     Bacteroides fragilis Not detected     Enterobacterales species Not detected     Enterobacter cloacae complex Not detected     Escherichia coli Not detected     Klebsiella aerogenes Not detected     Klebsiella oxytoca Not detected     Klebsiella pneumoniae group Not detected     Proteus Not detected     Salmonella Not detected     Serratia marcescens Not detected     Haemophilus influenzae Not detected     Neisseria meningitidis Not detected     Pseudomonas aeruginosa Not detected     Steno maltophilia Not detected     Candida albicans Not detected     Candida auris Not detected     Candida glabrata Not detected     Candida krusei Not detected     Candida parapsilosis Not detected     Candida tropicalis Not detected     Crypto neoformans/gattii Not detected     RESISTANT GENES:          MECA/C (Methicillin resistant gene) Detected        Comment False positive results may rarely occur.  Correlate with     Comment: clinical,epidemiologic,  and other laboratory findings  Please see BCID Interpretation Guide in EPIC Links         MRSA SCREEN - PCR (NASAL) [[id number]] Collected: 03/30/23 1500    Order Status: Completed Specimen: Swab Updated: 03/30/23 1709     MRSA by PCR, Nasal Not Detected       CULTURE, RESPIRATORY/SPUTUM/BRONCH Laura Angélica [[id number]] Collected: 03/30/23 1645    Order Status: Canceled Specimen: Sputum           CXR-IMPRESSION  Small left pleural effusion with bilateral pulmonary infiltrates. Assessment with MDM and DDx/Plan:     Acute hypoxic respiratory failure  Multifactorial CHF d/t A Fib/RVR + AECOPD; Questionable PNA    A-fib with RVR suspected new diagnosis --> Oral Lopressor twice daily; on [facility] following      Acute kidney injury on CKD-Appreciate nephrology consult. Outpatient follow-up  AECOPD    Contamination and sample bacteremia?-Staph coag negative in 2/4 bottles    Anemia; complex d/t hx MM/Cancer    New/active Dx of Multiple myeloma-hematology consulted; follows with Dr. Enio Chavez    Hx Breast cancer-continue previous medications    DMT2-monitor BG on sliding scale    Hypothyroidism-continue home levothyroxine    CKD stage III-monitor creatinine    Plan: 4/2/2023    Will need PT OT and may need home oxygen. Out of bed to chair  Increase oral intake    Advance directive-full code  DVT prophylaxis-subcu Lovenox  Next of kin-   Social determinants of health-none    Dispo-24 hours to 48 hours.   Determine need for oxygen    Signed By: Franchesca Courtney MD     April 2, 2023

## 2023-04-03 LAB
ALBUMIN SERPL-MCNC: 2.4 G/DL (ref 3.5–5)
ANION GAP SERPL CALC-SCNC: 5 MMOL/L (ref 5–15)
BASOPHILS # BLD: 0 K/UL (ref 0–0.1)
BASOPHILS NFR BLD: 0 % (ref 0–1)
BUN SERPL-MCNC: 16 MG/DL (ref 6–20)
BUN/CREAT SERPL: 13 (ref 12–20)
CA-I BLD-MCNC: 7.1 MG/DL (ref 8.5–10.1)
CHLORIDE SERPL-SCNC: 109 MMOL/L (ref 97–108)
CO2 SERPL-SCNC: 26 MMOL/L (ref 21–32)
CREAT SERPL-MCNC: 1.25 MG/DL (ref 0.55–1.02)
CRP SERPL-MCNC: 1.1 MG/DL (ref 0–0.6)
DIFFERENTIAL METHOD BLD: ABNORMAL
EOSINOPHIL # BLD: 0.4 K/UL (ref 0–0.4)
EOSINOPHIL NFR BLD: 4 % (ref 0–7)
ERYTHROCYTE [DISTWIDTH] IN BLOOD BY AUTOMATED COUNT: 15.9 % (ref 11.5–14.5)
GLUCOSE BLD STRIP.AUTO-MCNC: 67 MG/DL (ref 65–100)
GLUCOSE BLD STRIP.AUTO-MCNC: 88 MG/DL (ref 65–100)
GLUCOSE SERPL-MCNC: 89 MG/DL (ref 65–100)
HCT VFR BLD AUTO: 32.2 % (ref 35–47)
HGB BLD-MCNC: 10.4 G/DL (ref 11.5–16)
IMM GRANULOCYTES # BLD AUTO: 0.1 K/UL (ref 0–0.04)
IMM GRANULOCYTES NFR BLD AUTO: 1 % (ref 0–0.5)
LYMPHOCYTES # BLD: 1.2 K/UL (ref 0.8–3.5)
LYMPHOCYTES NFR BLD: 13 % (ref 12–49)
MCH RBC QN AUTO: 28 PG (ref 26–34)
MCHC RBC AUTO-ENTMCNC: 32.3 G/DL (ref 30–36.5)
MCV RBC AUTO: 86.6 FL (ref 80–99)
MONOCYTES # BLD: 1 K/UL (ref 0–1)
MONOCYTES NFR BLD: 11 % (ref 5–13)
NEUTS SEG # BLD: 6.6 K/UL (ref 1.8–8)
NEUTS SEG NFR BLD: 71 % (ref 32–75)
NRBC # BLD: 0 K/UL (ref 0–0.01)
NRBC BLD-RTO: 0 PER 100 WBC
PERFORMED BY, TECHID: NORMAL
PERFORMED BY, TECHID: NORMAL
PHOSPHATE SERPL-MCNC: 2 MG/DL (ref 2.6–4.7)
PLATELET # BLD AUTO: 340 K/UL (ref 150–400)
PMV BLD AUTO: 11.3 FL (ref 8.9–12.9)
POTASSIUM SERPL-SCNC: 3.6 MMOL/L (ref 3.5–5.1)
RBC # BLD AUTO: 3.72 M/UL (ref 3.8–5.2)
SODIUM SERPL-SCNC: 140 MMOL/L (ref 136–145)
WBC # BLD AUTO: 9.3 K/UL (ref 3.6–11)

## 2023-04-03 PROCEDURE — 74011250637 HC RX REV CODE- 250/637: Performed by: HOSPITALIST

## 2023-04-03 PROCEDURE — 86140 C-REACTIVE PROTEIN: CPT

## 2023-04-03 PROCEDURE — 82962 GLUCOSE BLOOD TEST: CPT

## 2023-04-03 PROCEDURE — 36415 COLL VENOUS BLD VENIPUNCTURE: CPT

## 2023-04-03 PROCEDURE — 80069 RENAL FUNCTION PANEL: CPT

## 2023-04-03 PROCEDURE — 85025 COMPLETE CBC W/AUTO DIFF WBC: CPT

## 2023-04-03 PROCEDURE — 74011250637 HC RX REV CODE- 250/637: Performed by: NURSE PRACTITIONER

## 2023-04-03 PROCEDURE — 94640 AIRWAY INHALATION TREATMENT: CPT

## 2023-04-03 RX ORDER — METOPROLOL SUCCINATE 50 MG/1
50 TABLET, EXTENDED RELEASE ORAL DAILY
Qty: 30 TABLET | Refills: 3 | Status: SHIPPED | OUTPATIENT
Start: 2023-04-03

## 2023-04-03 RX ORDER — FAMOTIDINE 10 MG/1
10 TABLET ORAL AS NEEDED
Qty: 30 TABLET | Refills: 0 | Status: SHIPPED
Start: 2023-04-03

## 2023-04-03 RX ORDER — DOXYCYCLINE 100 MG/1
100 CAPSULE ORAL EVERY 12 HOURS
Qty: 10 CAPSULE | Refills: 0 | Status: SHIPPED | OUTPATIENT
Start: 2023-04-03 | End: 2023-04-08

## 2023-04-03 RX ADMIN — DOCUSATE SODIUM 100 MG: 100 CAPSULE, LIQUID FILLED ORAL at 08:12

## 2023-04-03 RX ADMIN — ANASTROZOLE 1 MG: 1 TABLET ORAL at 08:12

## 2023-04-03 RX ADMIN — FUROSEMIDE 40 MG: 40 TABLET ORAL at 08:12

## 2023-04-03 RX ADMIN — VENLAFAXINE HYDROCHLORIDE 150 MG: 75 CAPSULE, EXTENDED RELEASE ORAL at 08:13

## 2023-04-03 RX ADMIN — IPRATROPIUM BROMIDE AND ALBUTEROL SULFATE 3 ML: 2.5; .5 SOLUTION RESPIRATORY (INHALATION) at 09:05

## 2023-04-03 RX ADMIN — GLIPIZIDE 5 MG: 5 TABLET ORAL at 05:38

## 2023-04-03 RX ADMIN — BUDESONIDE AND FORMOTEROL FUMARATE DIHYDRATE 2 PUFF: 160; 4.5 AEROSOL RESPIRATORY (INHALATION) at 09:06

## 2023-04-03 RX ADMIN — LEVOTHYROXINE SODIUM 100 MCG: 0.1 TABLET ORAL at 05:39

## 2023-04-03 RX ADMIN — APIXABAN 5 MG: 5 TABLET, FILM COATED ORAL at 08:12

## 2023-04-03 RX ADMIN — METOPROLOL TARTRATE 25 MG: 25 TABLET, FILM COATED ORAL at 08:13

## 2023-04-03 RX ADMIN — DOXYCYCLINE HYCLATE 100 MG: 100 CAPSULE ORAL at 08:12

## 2023-04-03 RX ADMIN — ALOGLIPTIN 12.5 MG: 12.5 TABLET, FILM COATED ORAL at 08:12

## 2023-04-03 RX ADMIN — ASPIRIN 81 MG: 81 TABLET, COATED ORAL at 08:12

## 2023-04-03 RX ADMIN — ACYCLOVIR 400 MG: 200 CAPSULE ORAL at 08:12

## 2023-04-03 RX ADMIN — CHOLECALCIFEROL TAB 125 MCG (5000 UNIT) 5000 UNITS: 125 TAB at 08:12

## 2023-04-03 RX ADMIN — PANTOPRAZOLE SODIUM 40 MG: 40 TABLET, DELAYED RELEASE ORAL at 05:38

## 2023-04-03 NOTE — PROGRESS NOTES
CARDIOLOGY PROGRESS NOTE      Patient Name: Ameena Alicea  Age: 74 y.o. Gender:female  :1950  MRN: [id number]    Patient seen and examined. This is a patient who is followed for new onset atrial fibrillation. Resting in bed, feels well. Shortness of breath close to baseline. Swelling better. No other complaints reported. Telemetry reviewed, sinus rhythm    Pertinent review of systems items noted above, all other systems are negative. Current medications reviewed. Physical Examination  Allergies   Allergen Reactions    Ppd Black Rubber Mix Other (comments)     Cellulitis    Erythromycin Base Unknown (comments)    Other Medication Other (comments)     TUBERCULOSIS  PT GETS CELLULITIS            Parafon Forte Dsc [Chlorzoxazone] Unknown (comments)    Ampicillin Unknown (comments) and Rash     Visit Vitals  BP (!) 102/58   Pulse 70   Temp 98 °F (36.7 °C)   Resp 20   Ht 5' 4\" (1.626 m)   Wt 103.6 kg (228 lb 6.3 oz)   SpO2 95%   BMI 39.20 kg/m²     Vital signs are stable. No apparent distress. Heart has a regular rate and rhythm. Normal S1, S2, soft murmur  Lungs are diminished bilaterally. Abdomen is soft, nontender, normal bowel sounds. Extremities have mild edema. Skin is dry and warm  Normal affect    Labs reviewed: Lab results reviewed. For significant abnormal values and values requiring intervention, see assessment and plan.   Recent Results (from the past 12 hour(s))   CBC WITH AUTOMATED DIFF    Collection Time: 23  5:25 AM   Result Value Ref Range    WBC 9.3 3.6 - 11.0 K/uL    RBC 3.72 (L) 3.80 - 5.20 M/uL    HGB 10.4 (L) 11.5 - 16.0 g/dL    HCT 32.2 (L) 35.0 - 47.0 %    MCV 86.6 80.0 - 99.0 FL    MCH 28.0 26.0 - 34.0 PG    MCHC 32.3 30.0 - 36.5 g/dL    RDW 15.9 (H) 11.5 - 14.5 %    PLATELET 192 260 - 396 K/uL    MPV 11.3 8.9 - 12.9 FL    NRBC 0.0 0.0  WBC    ABSOLUTE NRBC 0.00 0.00 - 0.01 K/uL    NEUTROPHILS 71 32 - 75 %    LYMPHOCYTES 13 12 - 49 %    MONOCYTES 11 5 - 13 % EOSINOPHILS 4 0 - 7 %    BASOPHILS 0 0 - 1 %    IMMATURE GRANULOCYTES 1 (H) 0 - 0.5 %    ABS. NEUTROPHILS 6.6 1.8 - 8.0 K/UL    ABS. LYMPHOCYTES 1.2 0.8 - 3.5 K/UL    ABS. MONOCYTES 1.0 0.0 - 1.0 K/UL    ABS. EOSINOPHILS 0.4 0.0 - 0.4 K/UL    ABS. BASOPHILS 0.0 0.0 - 0.1 K/UL    ABS. IMM. GRANS. 0.1 (H) 0.00 - 0.04 K/UL    DF AUTOMATED     C REACTIVE PROTEIN, QT    Collection Time: 04/03/23  5:25 AM   Result Value Ref Range    C-Reactive protein 1.10 (H) 0.00 - 0.60 mg/dL   RENAL FUNCTION PANEL    Collection Time: 04/03/23  5:25 AM   Result Value Ref Range    Sodium 140 136 - 145 mmol/L    Potassium 3.6 3.5 - 5.1 mmol/L    Chloride 109 (H) 97 - 108 mmol/L    CO2 26 21 - 32 mmol/L    Anion gap 5 5 - 15 mmol/L    Glucose 89 65 - 100 mg/dL    BUN 16 6 - 20 mg/dL    Creatinine 1.25 (H) 0.55 - 1.02 mg/dL    BUN/Creatinine ratio 13 12 - 20      eGFR 45 (L) >60 ml/min/1.73m2    Calcium 7.1 (L) 8.5 - 10.1 mg/dL    Phosphorus 2.0 (L) 2.6 - 4.7 mg/dL    Albumin 2.4 (L) 3.5 - 5.0 g/dL   GLUCOSE, POC    Collection Time: 04/03/23  7:26 AM   Result Value Ref Range    Glucose (POC) 88 65 - 100 mg/dL    Performed by [address] [place] [address], POC    Collection Time: 04/03/23 11:29 AM   Result Value Ref Range    Glucose (POC) 67 65 - 100 mg/dL    Performed by Dona Hawkins       Case discussed with Dr. Magda Earl and our impression and recommendations are as follows:  Atrial fibrillation, new onset rate elevated on admission  Currently in SR, gave her my card for follow up with me for afib management in 1-2 weeks.   Agree with lopressor, on eliquis   Follow up with Dr Benitez Olvera [facility] for afib management  Shortness of breath, likely multifactorial given COPD and AF RVR  Back to sinus rhythm  Echo with preserved EF, mild MR  BP tolerating po lasix, strict I&O and weights  Venous duplexes negative for DVT  Followed by Dr. Raphael Almeida for COPD  Nonobstructive CAD per CCTA, no chest pain reported, continue medical management with ASA  Multiple myeloma, hematology following appreciate input  Tobacco abuse, needs to quit    Follow up with Dr. Dax Gilliam in office in 2 weeks. Please do not hesitate to call me if additional questions arise.     [place]illo Camp, SHARI  4/3/2023

## 2023-04-03 NOTE — PROGRESS NOTES
Progress Note    Patient: Dietra Dancer MRN: [id number]  SSN: [id number]    YOB: 1950  Age: 74 y.o. Sex: female      Admit Date: 3/29/2023    LOS: 5 days     Subjective:   Patient followed for positive blood cultures with coagulase negative Staphylococci felt to be contaminant. Yesterday her WBC and CRP were elevated, however, today her WBC is normal again and CRP has decreased without IV Vancomycin. Blood cultures repeated this morning. Patient has been discharged. Objective:     Vitals:    04/03/23 0800 04/03/23 0906 04/03/23 0908 04/03/23 1045   BP:    (!) 102/58   Pulse: 64   70   Resp:    20   Temp:    98 °F (36.7 °C)   SpO2:  96% 96% 95%   Weight:       Height:            Intake and Output:  Current Shift: No intake/output data recorded. Last three shifts: 04/01 1901 - 04/03 0700  In: 1586 [P.O.:1586]  Out: -     Physical Exam:   Vitals and nursing note reviewed. Patient unavailable for re-examination  Constitutional:       General: She is not in acute distress. Appearance: She is obese. She is ill-appearing. HENT:      Head: Normocephalic and atraumatic. Right Ear: External ear normal.      Left Ear: External ear normal.      Nose: Nose normal.      Comments: Nasal cannula 2 L/min     Mouth/Throat:      Pharynx: Oropharynx is clear. Eyes:      Pupils: Pupils are equal, round, and reactive to light. Cardiovascular:      Rate and Rhythm: Normal rate and regular rhythm. Heart sounds: No murmur heard. Pulmonary:      Effort: Pulmonary effort is normal.      Breath sounds: Normal breath sounds. Abdominal:      General: Bowel sounds are normal. There is no distension. Palpations: Abdomen is soft. Tenderness: There is no abdominal tenderness. Genitourinary:     Comments: No Sharpe catheter  Musculoskeletal:      Cervical back: Neck supple. Right lower leg: No edema. Left lower leg: No edema. Skin:     Findings: No rash.    Neurological: General: No focal deficit present. Mental Status: She is alert and oriented to person, place, and time. Psychiatric:         Mood and Affect: Mood normal.         Behavior: Behavior normal.         Thought Content: Thought content normal.         Judgment: Judgment normal    Lab/Data Review:     WBC 9,300     CRP 1.10 <0.91 <1.70  Procal <0.05    MRSA nasal PCR Negative    Blood cultures (3/29) Staphylococcus species, coagulase negative (2 of 4 bottles)  Blood cultures (4/1) No growth 20 hours      CXR (4/1) Improved pulmonary edema    Assessment:     Active Problems:    COPD with acute exacerbation ([facility]) (3/29/2023)  Positive blood cultures with Coagulase negative staphylococcus species, consistent with contaminant  Leukocytosis and elevated CRP  Acute exacerbation of COPD  Acute hypoxic respiratory failure  Multiple myeloma     Comment:  Patient remains afebrile with still normal WBC. CRP increased slightyly. Repeat blood culture negative so far.      Plan:   No antibiotics recommended   Follow-up repeat blood cultures until finalized          Signed By: Dorothy Canas MD     April 3, 2023

## 2023-04-03 NOTE — PROGRESS NOTES
IMPRESSION:   Acute hypoxic respiratory failure now on 1 L nasal oxygen  A-fib with RVR improved  Acute exacerbation of COPD mild wheezing  Tobacco abuse  Acute kidney injury  Multiple myeloma with lytic lesions to the bone      RECOMMENDATIONS/PLAN:     75-year-old lady came in with shortness of breath  and  found to be in A-fib with RVR started on IV Cardizem and also received beta-blocker IV Cardizem was stopped  3/31  COPD better currently on nebulized DuoNeb 3 times daily Symbicort and oral prednisone  We will check overnight oximetry split study to see if can discontinue oxygen  History of multiple myeloma the patient has bony lytic lesions received chemotherapy  Chest x-ray 4/1 with improved pulmonary edema  Renal function improving Lasix now once a day     [x] High complexity decision making was performed  [x] See my orders for details  HPI  75-year-old lady came in because of difficulty breathing along with having cough past medical history of coronary artery disease multiple myeloma received chemotherapy yesterday and she was complaining of leg swelling shortness of breath and dyspnea she also had a history of COPD was with me regularly along with coronary artery disease, she is starting having wheezing using inhalers she is not on any oxygen at home so came to the hospital got admitted to ICU     PMH:  has a past medical history of Arthritis, Asthma, Cancer ([address] Chronic pain, COPD (chronic obstructive pulmonary disease) ([address] Depressive disorder (9/22/2020), Diabetes ([address] Essential hypertension (9/22/2020), GERD (gastroesophageal reflux disease), Hypothyroidism (9/22/2020), Malignant tumor of breast ([address] (9/22/2020), Osteoarthritis (9/22/2020), Pure hypercholesterolemia (9/22/2020), Sleep apnea, Thyroid disease (1980), and Type 2 diabetes mellitus without complication ([address] (5/03/5929).     PSH:   has a past surgical history that includes hx hymenectomy; hx heent; hx colonoscopy (10/20/2017); hx colonoscopy (2014); hx colonoscopy (2007); pr unlisted procedure breast (Right, 02/2016); pr mastectomy partial (Right, 2016); hx cholecystectomy (2013); and hx orthopaedic (Left, 07/07/2020). FHX: family history includes Cancer in her father; Diabetes in her brother, brother, brother, and sister; Heart Attack in her mother; Heart Disease in her brother, brother, and mother; Hypertension in her mother; Sergey Lyme in her brother; Lung Disease in her brother; Other in her father; Ovarian Cancer in her sister; Thyroid Disease in her mother. SHX:  reports that she has been smoking cigarettes. She has a 50.00 pack-year smoking history. She has never used smokeless tobacco. She reports that she does not currently use alcohol. She reports that she does not use drugs.     ALL:   Allergies   Allergen Reactions    Ppd Black Rubber Mix Other (comments)     Cellulitis    Erythromycin Base Unknown (comments)    Other Medication Other (comments)     TUBERCULOSIS  PT GETS CELLULITIS            Parafon Forte Dsc [Chlorzoxazone] Unknown (comments)    Ampicillin Unknown (comments) and Rash        MEDS:   [x] Reviewed - As Below   [] Not reviewed    Current Facility-Administered Medications   Medication    furosemide (LASIX) tablet 40 mg    apixaban (ELIQUIS) tablet 5 mg    metoprolol tartrate (LOPRESSOR) tablet 25 mg    doxycycline (VIBRAMYCIN) capsule 100 mg    albuterol-ipratropium (DUO-NEB) 2.5 MG-0.5 MG/3 ML    acyclovir (ZOVIRAX) capsule 400 mg    albuterol-ipratropium (DUO-NEB) 2.5 MG-0.5 MG/3 ML    anastrozole (ARIMIDEX) tablet 1 mg    aspirin delayed-release tablet 81 mg    cholecalciferol (VITAMIN D3) (5000 Units/125 mcg) tablet 5,000 Units    venlafaxine-SR (EFFEXOR-XR) capsule 150 mg    glipiZIDE (GLUCOTROL) tablet 5 mg    levothyroxine (SYNTHROID) tablet 100 mcg    hydrOXYzine pamoate (VISTARIL) capsule 25 mg    HYDROcodone-acetaminophen (NORCO) 5-325 mg per tablet 1 Tablet    pantoprazole (PROTONIX) tablet 40 mg alogliptin (NESINA) tablet 12.5 mg    budesonide-formoteroL (SYMBICORT) 160-4.5 mcg/actuation HFA inhaler 2 Puff    And    [Held by provider] tiotropium bromide (SPIRIVA RESPIMAT) 2.5 mcg /actuation    insulin lispro (HUMALOG) injection    glucose chewable tablet 16 g    glucagon (GLUCAGEN) injection 1 mg    dextrose 10% infusion 0-250 mL    sodium chloride (NS) flush 5-40 mL    albuterol CONCENTRATE 2.5mg/0.5 mL neb soln    acetaminophen (TYLENOL) tablet 650 mg    ondansetron (ZOFRAN ODT) tablet 4 mg    docusate sodium (COLACE) capsule 100 mg      MAR reviewed and pertinent medications noted or modified as needed   Current Facility-Administered Medications   Medication    furosemide (LASIX) tablet 40 mg    apixaban (ELIQUIS) tablet 5 mg    metoprolol tartrate (LOPRESSOR) tablet 25 mg    doxycycline (VIBRAMYCIN) capsule 100 mg    albuterol-ipratropium (DUO-NEB) 2.5 MG-0.5 MG/3 ML    acyclovir (ZOVIRAX) capsule 400 mg    albuterol-ipratropium (DUO-NEB) 2.5 MG-0.5 MG/3 ML    anastrozole (ARIMIDEX) tablet 1 mg    aspirin delayed-release tablet 81 mg    cholecalciferol (VITAMIN D3) (5000 Units/125 mcg) tablet 5,000 Units    venlafaxine-SR (EFFEXOR-XR) capsule 150 mg    glipiZIDE (GLUCOTROL) tablet 5 mg    levothyroxine (SYNTHROID) tablet 100 mcg    hydrOXYzine pamoate (VISTARIL) capsule 25 mg    HYDROcodone-acetaminophen (NORCO) 5-325 mg per tablet 1 Tablet    pantoprazole (PROTONIX) tablet 40 mg    alogliptin (NESINA) tablet 12.5 mg    budesonide-formoteroL (SYMBICORT) 160-4.5 mcg/actuation HFA inhaler 2 Puff    And    [Held by provider] tiotropium bromide (SPIRIVA RESPIMAT) 2.5 mcg /actuation    insulin lispro (HUMALOG) injection    glucose chewable tablet 16 g    glucagon (GLUCAGEN) injection 1 mg    dextrose 10% infusion 0-250 mL    sodium chloride (NS) flush 5-40 mL    albuterol CONCENTRATE 2.5mg/0.5 mL neb soln    acetaminophen (TYLENOL) tablet 650 mg    ondansetron (ZOFRAN ODT) tablet 4 mg    docusate sodium (COLACE) capsule 100 mg      PMH:  has a past medical history of Arthritis, Asthma, Cancer ([facility] [address] Chronic pain, COPD (chronic obstructive pulmonary disease) ([address] Depressive disorder (9/22/2020), Diabetes ([facility] [address] Essential hypertension (9/22/2020), GERD (gastroesophageal reflux disease), Hypothyroidism (9/22/2020), Malignant tumor of breast ([facility] [address] (9/22/2020), Osteoarthritis (9/22/2020), Pure hypercholesterolemia (9/22/2020), Sleep apnea, Thyroid disease (1980), and Type 2 diabetes mellitus without complication ([facility] [address] (1/51/4016). PSH:   has a past surgical history that includes hx hymenectomy; hx heent; hx colonoscopy (10/20/2017); hx colonoscopy (2014); hx colonoscopy (2007); pr unlisted procedure breast (Right, 02/2016); pr mastectomy partial (Right, 2016); hx cholecystectomy (2013); and hx orthopaedic (Left, 07/07/2020). FHX: family history includes Cancer in her father; Diabetes in her brother, brother, brother, and sister; Heart Attack in her mother; Heart Disease in her brother, brother, and mother; Hypertension in her mother; Amado Bougie in her brother; Lung Disease in her brother; Other in her father; Ovarian Cancer in her sister; Thyroid Disease in her mother. SHX:  reports that she has been smoking cigarettes. She has a 50.00 pack-year smoking history. She has never used smokeless tobacco. She reports that she does not currently use alcohol. She reports that she does not use drugs. ROS:A comprehensive review of systems was negative except for that written in the HPI. Hemodynamics:    CO:    CI:    CVP:    SVR:   PAP Systolic:    PAP Diastolic:    PVR:    UR51:        Ventilator Settings:      Mode Rate TV Press PEEP FiO2 PIP Min.  Vent               26 %              Vital Signs: Telemetry:    AFIB Intake/Output:   Visit Vitals  /64 (BP 1 Location: Left upper arm, BP Patient Position: At rest)   Pulse 64   Temp 97.8 °F (36.6 °C)   Resp 20   Ht 5' 4\" (1.626 m)   Wt 103.6 kg (228 lb 6.3 oz) SpO2 96%   BMI 39.20 kg/m²       Temp (24hrs), Av.8 °F (36.6 °C), Min:97.5 °F (36.4 °C), Max:98.2 °F (36.8 °C)        O2 Device: None (Room air) O2 Flow Rate (L/min): 1 l/min       Wt Readings from Last 4 Encounters:   23 103.6 kg (228 lb 6.3 oz)   23 105.2 kg (232 lb)   22 103.9 kg (229 lb)   22 101.6 kg (224 lb)          Intake/Output Summary (Last 24 hours) at 4/3/2023 1006  Last data filed at 2023 1736  Gross per 24 hour   Intake 902 ml   Output --   Net 902 ml         Last shift:      No intake/output data recorded. Last 3 shifts:  1901 -  0700  In: 12 [P.O.:1586]  Out: -        Physical Exam:     General:  female; on oxygen 1 L nasal cannula  HEENT: NCAT, normal oral mucosa  Eyes: anicteric; conjunctiva clear extraocular movements intact  Neck: no nodes,  trach midline; no accessory MM use. Chest: no deformity,   Cardiac: Irregular rhythm rate controlled  Lungs: Clear anterior and laterally with just a few rales posteriorly no wheezing  Abd: Soft positive bowel sounds no tenderness  Ext: no edema; no joint swelling;  No clubbing  : clear urine  Neuro: Alert awake oriented speech is clear moves all 4 extremities  Psych- no agitation, oriented to person;   Skin: warm, dry, no cyanosis;   Pulses: Brachial and radial pulses intact  Capillary: Normal capillary refill      DATA:    MAR reviewed and pertinent medications noted or modified as needed  MEDS:   Current Facility-Administered Medications   Medication    furosemide (LASIX) tablet 40 mg    apixaban (ELIQUIS) tablet 5 mg    metoprolol tartrate (LOPRESSOR) tablet 25 mg    doxycycline (VIBRAMYCIN) capsule 100 mg    albuterol-ipratropium (DUO-NEB) 2.5 MG-0.5 MG/3 ML    acyclovir (ZOVIRAX) capsule 400 mg    albuterol-ipratropium (DUO-NEB) 2.5 MG-0.5 MG/3 ML    anastrozole (ARIMIDEX) tablet 1 mg    aspirin delayed-release tablet 81 mg    cholecalciferol (VITAMIN D3) (5000 Units/125 mcg) tablet 5,000 Units venlafaxine-SR (EFFEXOR-XR) capsule 150 mg    glipiZIDE (GLUCOTROL) tablet 5 mg    levothyroxine (SYNTHROID) tablet 100 mcg    hydrOXYzine pamoate (VISTARIL) capsule 25 mg    HYDROcodone-acetaminophen (NORCO) 5-325 mg per tablet 1 Tablet    pantoprazole (PROTONIX) tablet 40 mg    alogliptin (NESINA) tablet 12.5 mg    budesonide-formoteroL (SYMBICORT) 160-4.5 mcg/actuation HFA inhaler 2 Puff    And    [Held by provider] tiotropium bromide (SPIRIVA RESPIMAT) 2.5 mcg /actuation    insulin lispro (HUMALOG) injection    glucose chewable tablet 16 g    glucagon (GLUCAGEN) injection 1 mg    dextrose 10% infusion 0-250 mL    sodium chloride (NS) flush 5-40 mL    albuterol CONCENTRATE 2.5mg/0.5 mL neb soln    acetaminophen (TYLENOL) tablet 650 mg    ondansetron (ZOFRAN ODT) tablet 4 mg    docusate sodium (COLACE) capsule 100 mg        Labs:    Recent Labs     04/03/23  0525 04/02/23  1117 04/01/23  0400   WBC 9.3 10.3 12.6*   HGB 10.4* 10.3* 9.5*    376 402*       Recent Labs     04/03/23  0525 04/01/23  0400    138   K 3.6 3.5   * 108   CO2 26 25   GLU 89 191*   BUN 16 23*   CREA 1.25* 1.79*   CA 7.1* 7.6*  7.6*   MG  --  2.0   PHOS 2.0*  --    ALB 2.4* 2.6*   ALT  --  12       No results for input(s): PH, PCO2, PO2, HCO3, FIO2 in the last 72 hours.     Procalcitonin less than 0.05  Lab Results   Component Value Date/Time    Culture result: No growth after 20 hours 04/01/2023 04:00 AM    Culture result: (A) 03/29/2023 05:56 PM     Staphylococcus epidermidis (Oxacillin resistant) growing in 2 of 4 bottles drawn No Site Indicated    Culture result:  03/29/2023 05:56 PM     (NOTE) [facility] CALLED TO FERMÍN SAHA , AT 1351 ON 3/31/23 BY GENESIS     Lab Results   Component Value Date/Time    TSH 3.360 06/01/2022 11:58 AM        Imaging:    Results from [facility] encounter on 03/29/23    XR CHEST PORT    Narrative  INDICATION: chf    EXAMINATION:  AP CHEST, PORTABLE    COMPARISON: 3/31/2023    FINDINGS: Single AP portable view of the chest demonstrates slightly  underinflated lungs. The cardiomediastinal silhouette is unchanged. Mild  pulmonary edema, improved in the interval. Bibasilar atelectasis. Chronic left  clavicle fracture, nonunited. Impression  Improved pulmonary edema. Results from [place] encounter on 06/09/22    CT CHEST WO CONT    Narrative  CT dose reduction was achieved through use of a standardized protocol tailored  for this examination and automatic exposure control for dose modulation. Noncontrast study is a follow-up from one year ago and shows mild emphysema and  subpleural fibrosis with some geographic aeration differences, matching findings  of previous. No evidence of nodule, edema or consolidation. Central airways are  open. No mediastinal or hilar adenopathy. Normal caliber aorta. No pleural pericardial  effusion. No significant upper abdominal or chest wall finding    Impression  No change.  No active findings      3/31 she is on 2 L nasal cannula Sleepy this a.m. worsening of renal function we will get nephrology consult chest x-ray shows CHF pulmonary edema A-fib with RVR on Cardizem discontinue Solu-Medrol start patient on prednisone  4/1 alert awake feeling better on nasal cannula off IV Cardizem, culture was positive for staph most likely contaminant coagulase-negative, on doxycycline  4/2 awake alert feels much better oxygen down to 1 L we will check overnight oximetry split study  4/3 doing better on room air now overnight pulse ox shows no desaturation

## 2023-04-03 NOTE — PROGRESS NOTES
CM met with patient to discuss DC, patient recc for [facility], patient declined, feels she does not need HH at this time. Medicare pt has received, reviewed, and signed 2nd IM letter informing them of their right to appeal the discharge. Signed copied has been placed on pt bedside chart. Patient clear to d/c from CM to home self at this time.

## 2023-04-03 NOTE — PROGRESS NOTES
Patient has discharge orders for today. All physicians cleared patient for discharge. Case management cleared patient to go home with self care. Patient alert, oriented, stable and shows no signs of distress. Discharged with no tele, payne or IV. Educated patient on discharge information and patient verbalized understanding. Discharge plan of care/case management plan validated with provider discharge order.

## 2023-04-03 NOTE — DISCHARGE SUMMARY
Physician Discharge Summary     Patient ID:    Arturo Pina  [id number]  74 y.o.  1950    Admit date: 3/29/2023    Discharge date : 4/3/2023    Chronic Diagnoses:    Problem List as of 4/3/2023 Date Reviewed: 3/29/2023            Codes Class Noted - Resolved    COPD with acute exacerbation [facility]) ICD-10-CM: J44.1  ICD-9-CM: 491.21  3/29/2023 - Present        Multiple myeloma ([facility] ICD-10-CM: C90.00  ICD-9-CM: 203.00  2/23/2023 - Present        Left renal mass ICD-10-CM: N28.89  ICD-9-CM: 593.9  2/23/2023 - Present        Sleep apnea ICD-10-CM: G47.30  ICD-9-CM: 780.57  8/2/2022 - Present        Hyperlipidemia ICD-10-CM: E78.5  ICD-9-CM: 272.4  8/2/2022 - Present        History of breast cancer ICD-10-CM: Z85.3  ICD-9-CM: V10.3  8/2/2022 - Present    Overview Signed 11/22/2022  3:09 PM by Savannah Wolf DO     Right breast. 2016. Status post surgery, chemotherapy, radiation. Followed by [facility]. Type 2 diabetes mellitus with chronic kidney disease ([facility] ICD-10-CM: E11.22  ICD-9-CM: 250.40, 585.9  5/18/2022 - Present        Severe obesity ([facility] ICD-10-CM: E66.01  ICD-9-CM: 278.01  9/24/2020 - Present        Chronic obstructive pulmonary disease ([facility] ICD-10-CM: J44.9  ICD-9-CM: 394  9/24/2020 - Present        Depression ICD-10-CM: F32. A  ICD-9-CM: 412  9/22/2020 - Present        Essential hypertension ICD-10-CM: I10  ICD-9-CM: 401.9  9/22/2020 - Present        Hypothyroidism ICD-10-CM: E03.9  ICD-9-CM: 244.9  9/22/2020 - Present        GERD (gastroesophageal reflux disease) ICD-10-CM: K21.9  ICD-9-CM: 530.81  Unknown - Present        Arthritis ICD-10-CM: M19.90  ICD-9-CM: 716.90  Unknown - Present        Nontraumatic complete tear of left rotator cuff ICD-10-CM: M75.122  ICD-9-CM: 727.61  7/1/2020 - Present        RESOLVED: Moderate episode of recurrent major depressive disorder ([facility] ICD-10-CM: F33.1  ICD-9-CM: 296.32  8/18/2022 - 11/22/2022 RESOLVED: COPD (chronic obstructive pulmonary disease) ([facility] ICD-10-CM: J44.9  ICD-9-CM: 496  9/24/2020 - 8/3/2021        RESOLVED: Screening for alcoholism ICD-10-CM: Z13.39  ICD-9-CM: V79.1  9/24/2020 - 11/22/2022        RESOLVED: Medicare annual wellness visit, subsequent ICD-10-CM: Z00.00  ICD-9-CM: V70.0  9/24/2020 - 10/24/2020        RESOLVED: Screening for depression ICD-10-CM: Z13.31  ICD-9-CM: V79.0  9/24/2020 - 11/22/2022        RESOLVED: Malignant tumor of breast ([facility] ICD-10-CM: C50.919  ICD-9-CM: 174.9  9/22/2020 - 8/2/2022        RESOLVED: Osteoarthritis ICD-10-CM: M19.90  ICD-9-CM: 715.90  9/22/2020 - 8/3/2021        RESOLVED: Pure hypercholesterolemia ICD-10-CM: E78.00  ICD-9-CM: 272.0  9/22/2020 - 11/22/2022 22    Final Diagnoses:   COPD with acute exacerbation ([facility] [J44.1]    Reason for Hospitalization:  A-fib with RVR suspected new diagnosis --> Oral Lopressor twice daily; on [facility] following        Acute kidney injury on CKD-Appreciate nephrology consult. Outpatient follow-up  AECOPD     Contamination and sample bacteremia?-Staph coag negative in 2/4 bottles     Anemia; complex d/t hx MM/Cancer     New/active Dx of Multiple myeloma-hematology consulted; follows with Dr. Liz Harrell     Hx Breast cancer-continue previous medications     DMT2-monitor BG on sliding scale     Hypothyroidism-continue home levothyroxine     CKD stage III-monitor creatinine      Hospital Course:      74 y.o. female with history of COPD with shortness of breath only with activity at baseline on treatment stable condition, diabetes mellitus, hypothyroidism on supplementation, hypertension, history of carcinoma breast treated, currently on adjuvant treatment with anastrozole, recently diagnosed with multiple myeloma with several bone lesions on treatment was sent from oncologist office due to significant shortness of breath noted and lower extremity edema.   States that she started having shortness of breath increased for the last 3 days, wheezing, using her regular inhalers with not much improvement. Denies any fever, chills. Denies any chest pain or palpitations. Admits lower extremity weakness is recently increased, the redness is from scratching the skin when she had allergic reaction to the chemotherapy. She does have orthopnea at baseline with no change. When speaking she was very dyspneic and I can hear the wheezing. She is not on any oxygen at home, started on oxygen here with much improvement. She had an echocardiogram at cardiologist office in June 2022 with normal ejection fraction. States that she is regularly following with cardiology, denies any issues at this time. She was found to have new onset Afib with RVR. Was started on cardizem gtt and required. She was started on eliquis. CXR showed pulmonary edema. She was continued on diuresis. culture was positive for staph received doxycycline. She improved clinically and was changed to room air. Pulmonary and ID following. Given her overnight pulse ox was incomplete she was follow-up pulmonology as OP. INSTRUCTIONS ON DISCHARGE:     Please note that these are the following medicaitons:      ELIQUIS 5 mg twice a day       METOPROLOL 50 mg daily    Please note that the above medication may cause heart burn, so please take FAMOTIDINE as needed. Please take DOXYCYCLINE daily for 5 days              Discharge Medications:   Current Discharge Medication List        START taking these medications    Details   apixaban (ELIQUIS) 5 mg tablet Take 1 Tablet by mouth two (2) times a day for 30 days. Qty: 60 Tablet, Refills: 3  Start date: 4/3/2023, End date: 5/3/2023      doxycycline (VIBRAMYCIN) 100 mg capsule Take 1 Capsule by mouth every twelve (12) hours for 5 days. Qty: 10 Capsule, Refills: 0  Start date: 4/3/2023, End date: 4/8/2023      metoprolol succinate (TOPROL-XL) 50 mg XL tablet Take 1 Tablet by mouth daily.   Qty: 30 Tablet, Refills: 3  Start date: 4/3/2023      famotidine (PEPCID) 10 mg tablet Take 1 Tablet by mouth as needed for Heartburn or Gastroesophageal Reflux Disease (GERD). Qty: 30 Tablet, Refills: 0  Start date: 4/3/2023           CONTINUE these medications which have NOT CHANGED    Details   dexAMETHasone (DECADRON) 4 mg tablet Take 40 mg by mouth every seven (7) days. SITagliptin phosphate (JANUVIA) 100 mg tablet Take 100 mg by mouth daily. HYDROcodone-acetaminophen (NORCO) 5-325 mg per tablet Take 1 Tablet by mouth four (4) times daily as needed for Severe Pain. acyclovir (ZOVIRAX) 400 mg tablet Take 400 mg by mouth two (2) times a day. Start date: 3/29/2023      omeprazole (PRILOSEC) 20 mg capsule Take 20 mg by mouth two (2) times a day. Start date: 3/29/2023      rosuvastatin (CRESTOR) 40 mg tablet Take 1 Tablet by mouth nightly. Indications: high cholesterol and high triglycerides  Qty: 90 Tablet, Refills: 1    Associated Diagnoses: Mixed hyperlipidemia      hydrOXYzine pamoate (VISTARIL) 50 mg capsule TAKE 1 TO 2 CAPSULES BY MOUTH EVERY NIGHT 30 MINUTES BEFORE BEDTIME FOR INSOMNIA      bortezomib (VELCADE INJECTION) by Injection route. lenalidomide 10 mg cap Take  by mouth. glipiZIDE (GLUCOTROL) 5 mg tablet Take 1 tab by mouth 30 min prior to breakfast daily for diabetes. Qty: 90 Tablet, Refills: 1    Associated Diagnoses: Type 2 diabetes mellitus with stage 3a chronic kidney disease, without long-term current use of insulin ([facility])      levothyroxine (SYNTHROID) 100 mcg tablet Take 1 Tablet by mouth Daily (before breakfast). Indications: a condition with low thyroid hormone levels  Qty: 90 Tablet, Refills: 1    Associated Diagnoses: Essential hypertension      empagliflozin (Jardiance) 10 mg tablet Take 1 Tablet by mouth daily. Qty: 90 Tablet, Refills: 3      venlafaxine-SR (EFFEXOR-XR) 150 mg capsule Take 1 Capsule by mouth daily.   Qty: 90 Capsule, Refills: 1    Associated Diagnoses: Moderate episode of recurrent major depressive disorder (HCC)      amLODIPine (NORVASC) 10 mg tablet Take 1 Tablet by mouth daily. Qty: 90 Tablet, Refills: 1    Associated Diagnoses: Primary hypertension      furosemide (LASIX) 20 mg tablet Take 1 Tablet by mouth daily. Qty: 90 Tablet, Refills: 1      Trelegy Ellipta 100-62.5-25 mcg inhaler Take 1 Puff by inhalation daily. anastrozole (ARIMIDEX) 1 mg tablet Take 1 mg by mouth daily. aspirin delayed-release 81 mg tablet Take 81 mg by mouth daily. omega-3 fatty acids/dha/epa (MEGARED PLANT-OMEGA-3 PO) Take 800 mg by mouth daily. vitamin E (AQUA GEMS) 400 unit capsule Take 400 Units by mouth daily. cholecalciferol (VITAMIN D3) (5000 Units/125 mcg) tab tablet Take 5,000 Units by mouth daily. Follow up Care:    1. Candie Darnell DO in 1-2 weeks. Please call to set up an appointment shortly after discharge. Diet:  Cardiac Diet    Disposition:  Home     Advanced Directive:   FULL x   DNR      Discharge Exam:  General:  female; on oxygen 2 L nasal cannula  HEENT: NCAT, poor dentition, lips and mucosa dry  Eyes: anicteric; conjunctiva clear  Neck: no nodes,  trach midline; no accessory MM use. Chest: no deformity,   Cardiac: IR regular; no murmur;   Lungs: distant breath sounds; bilateral wheezes  Abd: soft, NT, hypoactive BS  Ext: no edema; no joint swelling; No clubbing  : NO payne, clear urine  Neuro: Alert awake  Psych- no agitation, oriented to person;   Skin: warm, dry, no cyanosis;   Pulses: 1-2+ Bilateral pedal, radial  Capillary: brisk; pale       CONSULTATIONS: Cardiology    Significant Diagnostic Studies:     Radiologic Studies -   Results from Hospital Encounter encounter on 03/29/23    XR CHEST PORT    Narrative  INDICATION: chf    EXAMINATION:  AP CHEST, PORTABLE    COMPARISON: 3/31/2023    FINDINGS: Single AP portable view of the chest demonstrates slightly  underinflated lungs.  The cardiomediastinal silhouette is unchanged. Mild  pulmonary edema, improved in the interval. Bibasilar atelectasis. Chronic left  clavicle fracture, nonunited. Impression  Improved pulmonary edema.      CT Results  (Last 48 hours)      None                Discharge time spent 35 minutes    Signed:  Teri Pathak MD  4/3/2023  10:36 AM

## 2023-04-04 ENCOUNTER — PATIENT OUTREACH (OUTPATIENT)
Dept: CASE MANAGEMENT | Age: 73
End: 2023-04-04

## 2023-04-04 NOTE — PROGRESS NOTES
Care Transitions Initial Call    Call within 2 business days of discharge: Yes     Patient Current Location: [place]    Patient: Dru Taylor Patient : 1950 MRN: [id number]    Last Discharge  Street       Date Complaint Diagnosis Description Type Department Provider    3/29/23 Shortness of Breath; Peripheral Edema SOB (shortness of breath) . .. ED to Hosp-Admission (Discharged) (ADMIT) [id number] Preston Maynard MD; Eliza Murillo. .. Was this an external facility discharge? No     Challenges to be reviewed by the provider   Additional needs identified to be addressed with provider: yes    AHRF- COPD- current smoker. ID consulted, blood cultures-contaminant. New medications: doxycyclline 100 mg BID for 5 days. Continue:  trelegy 1p daily. Has follow up with PCP on 4/10  Has follow up with Dr. Yoselin Albert  on 5/3. New Afib w/RVR- echo done. New medications: Eliquis 5 mg BID, Toprol XL 50 mg daily. Continue: lasix 20 mg daily, amlodipine 10 mg daily, Januvia 100 mg daily and ASA 81 mg daily. Has follow up with Dr. Lore Mendoza on 5/3. HAYLEY- POA. Follow up with nephrology- she will call office to schedule. Labs:  4/3-cr- 1.25- decreasing. Albumin 2.4-remains low. A1C- 3/30- 8. 1.  continue glipizide 5 mg daily-am.             Method of communication with provider : chart routing to PCP    COVID-19 related testing was not done at this time. Advance Care Planning:   Does patient have an Advance Directive: not on file. Inpatient Readmission Risk score: Unplanned Readmit Risk Score: 13.7    Was this a readmission? no     Patients top risk factors for readmission: medical condition-    MML- currently receiving chemo, new AFib w/RVR episode, HAYLEY, COPD-current smoker.    Interventions to address risk factors: Education of patient/family/caregiver/guardian to support self-management- , Assessment and support for treatment adherence and medication management- , and communication with PCP    Care Transition Nurse (CTN) contacted the patient by telephone to perform post hospital discharge assessment. Verified name and  with patient as identifiers. Provided introduction to self, and explanation of the CTN role. CTN reviewed discharge instructions, medical action plan and red flags with patient who verbalized understanding. Were discharge instructions available to patient? yes. Reviewed appropriate site of care based on symptoms and resources available to patient including: PCP, Specialist, After hours contact number-using patient portal to communicate with providers, hematology NN, and [address]. Patient given an opportunity to ask questions and does not have any further questions or concerns at this time. The parent agrees to contact the PCP and/or specialist office for questions related to their healthcare. Medication reconciliation was performed with patient, who verbalizes understanding of administration of home medications. Advised obtaining a 90-day supply of all daily and as-needed medications. Referral to Pharm D needed: no     Home Health/Outpatient orders at discharge:  declined    Durable Medical Equipment ordered at discharge: None    Was patient discharged with a pulse oximeter? no    Discussed follow-up appointments. If no appointment was previously scheduled, appointment scheduling offered:  she will call Nephrology to schedule follow up . Is follow up appointment scheduled within 7 days of discharge? yes. [facility] follow up appointment(s):   Future Appointments   Date Time Provider Shwetha Campuzano   4/10/2023 11:30 AM Price Martin MD [facility] BS [facility]     Non-[facility] follow up appointment(s):   Hem-oncology- Dr. Kerry Smith- 23. Plan for follow-up call in 5-7 days based on severity of symptoms and risk factors. Plan for next call:   Assess current symptoms including daily monitoring items: BG, BP/HR.    Picked up new medications prescribed at discharge, monitor for bleeding. Attended Hem-Oncology follow up scheduled for 4/5- plan for chemo TX. Lab work done? Appointment in place with Nephrology   Assess if AMD in place, if so, secure copy for EMR. If not, if receptive, refer to assist with completing. CTN provided contact information for future needs. Goals Addressed                   This Visit's Progress       General     Reduce risk for hospitalization        4/4/23- spoke with Ms. Barbi Noriega, she reports no audible wheezing, note moist cough during conversation. She states she feels much better. Reports that she does not need oxygen. Confirms she is still smoking. 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 She checks BG values at home, can check BP and HR. Reviewed contacting cardiology if HR in 40's. She does have some dizziness-lightheadedness, no change. She has not picked up new medications:   Complete 5 day course of doxycycline BID. New Eliquis 5 mg BID. She understands to monitor for bleeding. She had pepcid at home to take. Will  RX today from pharmacy, she received notification they are ready. Reminded her they are BID. Discussion about follow up visits: shared the nephrology had written she should follow up OP-she will call them to check. She will check with Hem-Oncology about scheduled treatment for tomorrow.     LLC

## 2023-04-05 LAB
BACTERIA SPEC CULT: NORMAL
SPECIAL REQUESTS,SREQ: NORMAL

## 2023-04-24 ENCOUNTER — PATIENT OUTREACH (OUTPATIENT)
Dept: CASE MANAGEMENT | Age: 73
End: 2023-04-24

## 2023-04-24 ENCOUNTER — OFFICE VISIT (OUTPATIENT)
Dept: FAMILY MEDICINE CLINIC | Age: 73
End: 2023-04-24
Payer: MEDICARE

## 2023-04-24 VITALS
HEART RATE: 81 BPM | WEIGHT: 221 LBS | BODY MASS INDEX: 37.73 KG/M2 | DIASTOLIC BLOOD PRESSURE: 68 MMHG | RESPIRATION RATE: 18 BRPM | SYSTOLIC BLOOD PRESSURE: 138 MMHG | TEMPERATURE: 97.8 F | OXYGEN SATURATION: 94 % | HEIGHT: 64 IN

## 2023-04-24 DIAGNOSIS — N18.31 TYPE 2 DIABETES MELLITUS WITH STAGE 3A CHRONIC KIDNEY DISEASE, WITHOUT LONG-TERM CURRENT USE OF INSULIN (HCC): ICD-10-CM

## 2023-04-24 DIAGNOSIS — J44.1 COPD WITH ACUTE EXACERBATION (HCC): Primary | ICD-10-CM

## 2023-04-24 DIAGNOSIS — E11.22 TYPE 2 DIABETES MELLITUS WITH STAGE 3A CHRONIC KIDNEY DISEASE, WITHOUT LONG-TERM CURRENT USE OF INSULIN (HCC): ICD-10-CM

## 2023-04-24 DIAGNOSIS — R92.8 ABNORMAL MAMMOGRAM: Primary | ICD-10-CM

## 2023-04-24 DIAGNOSIS — I10 ESSENTIAL HYPERTENSION: ICD-10-CM

## 2023-04-24 DIAGNOSIS — I50.22 CHRONIC SYSTOLIC (CONGESTIVE) HEART FAILURE (HCC): ICD-10-CM

## 2023-04-24 PROCEDURE — 1123F ACP DISCUSS/DSCN MKR DOCD: CPT | Performed by: FAMILY MEDICINE

## 2023-04-24 PROCEDURE — 99214 OFFICE O/P EST MOD 30 MIN: CPT | Performed by: FAMILY MEDICINE

## 2023-04-24 PROCEDURE — 3078F DIAST BP <80 MM HG: CPT | Performed by: FAMILY MEDICINE

## 2023-04-24 PROCEDURE — 3075F SYST BP GE 130 - 139MM HG: CPT | Performed by: FAMILY MEDICINE

## 2023-04-24 PROCEDURE — 3052F HG A1C>EQUAL 8.0%<EQUAL 9.0%: CPT | Performed by: FAMILY MEDICINE

## 2023-04-24 RX ORDER — FUROSEMIDE 40 MG/1
40 TABLET ORAL DAILY
Qty: 90 TABLET | Refills: 2 | Status: SHIPPED | OUTPATIENT
Start: 2023-04-24

## 2023-04-24 RX ORDER — FUROSEMIDE 40 MG/1
40 TABLET ORAL DAILY
COMMUNITY
End: 2023-04-24 | Stop reason: SDUPTHER

## 2023-04-24 NOTE — PATIENT INSTRUCTIONS
Diagnosis(es) discussed to patient satisfaction. Walk 30 minutes after each meal.   Eat healthy with 5 serving (tennis ball size) of green veggies/fresh fruit. Continue with current medications. Get 8 hours of sleep with avoiding drinking, eating and blue screen devices one hour before bedtime. OPAL diet No sugar. No salty foods. No junk food. Weight daily. Call us for 3 pound weight gain. Goal. FBW  mg/dl. @ hours after eating RBS < 140 mg/dl. Avoid low BSs.

## 2023-04-24 NOTE — PROGRESS NOTES
Mattie Wells (: 1950) is a 74 y.o. female, established patient, here for evaluation of the following chief complaint(s):  Hospital Follow Up         ASSESSMENT/PLAN:  Below is the assessment and plan developed based on review of pertinent history, physical exam, labs, studies, and medications. SUBJECTIVE/OBJECTIVE:  Hospital Follow Up  The history is provided by the Patient. This is a chronic (COPD) problem. The problem occurs constantly. The problem has been rapidly worsening (Exacerbation from multiple myeloma treatment medication. ). Associated symptoms include chest pain. Treatments tried: Hospitalized back to back with 2 [facility], before COPD returned to baseline. Review of Systems   Constitutional:  Negative for chills and fever. Cardiovascular:  Positive for chest pain. Gastrointestinal:  Positive for constipation (with hosptilaiztion and now resolved. ). Negative for blood in stool, diarrhea, nausea and vomiting. Physical Exam  Constitutional:       Appearance: Normal appearance. Neck:      Vascular: No carotid bruit. Cardiovascular:      Rate and Rhythm: Normal rate and regular rhythm. Heart sounds: Normal heart sounds. No murmur heard. Pulmonary:      Effort: Respiratory distress (very mind with resting) present. Breath sounds: Normal breath sounds. No wheezing or rhonchi. Abdominal:      General: Abdomen is flat. Bowel sounds are normal. There is no distension. Palpations: Abdomen is soft. There is no mass. Tenderness: There is no abdominal tenderness. Musculoskeletal:      Cervical back: Neck supple. No tenderness. Neurological:      Mental Status: She is alert and oriented to person, place, and time. Gait: Gait abnormal (using walker and uses arms to stand from seated position. ). Psychiatric:         Mood and Affect: Mood normal.         Behavior: Behavior normal.         Thought Content:  Thought content normal. Judgment: Judgment normal.         An electronic signature was used to authenticate this note.   -- Mónica Moreno MD

## 2023-04-24 NOTE — PROGRESS NOTES
1. Have you been to the ER, urgent care clinic since your last visit? Hospitalized since your last visit? [facility] 3/29/23    2. Have you seen or consulted any other health care providers outside of the [address] [place] [address] since your last visit? Include any pap smears or colon screening.  No      Chief Complaint   Patient presents with    Hospital Follow Up     Visit Vitals  /68 (BP 1 Location: Right upper arm, BP Patient Position: Sitting, BP Cuff Size: Large adult)   Pulse 81   Temp 97.8 °F (36.6 °C) (Temporal)   Resp 18   Ht 5' 4\" (1.626 m)   Wt 221 lb (100.2 kg)   SpO2 94%   BMI 37.93 kg/m²

## 2023-04-24 NOTE — PROGRESS NOTES
Care Transitions Follow Up Call    Patient Current Location: [place]    Challenges to be reviewed by the provider   Additional needs identified to be addressed with provider: no  Refer to goals section. Method of communication with provider : none    Care Transition Nurse (CTN) contacted the patient by telephone to follow up after admission on 3/29/23. Left VM asking for return call. 2nd call and reached and verified name and  with patient as identifiers. Addressed changes since last contact: refer to goals section. Follow up appointment completed? yes. Was follow up appointment scheduled within 7 days of discharge? Yes, but she cancelled appointment on 4/10 and rescheduled to  and cancelled, rescheduled to - attended. CTN reviewed current symptoms, discharge instructions, medical action plan and red flags with patient and discussed any barriers to care and/or understanding of plan of care after discharge. Discussed appropriate site of care based on symptoms and resources available to patient including: PCP, Specialist, After hours contact number- , and [employer]. The patient agrees to contact the PCP and/or specialist office for questions related to their healthcare. [facility] follow up appointment(s): No future appointments. Non-[facility] follow up appointment(s):   Cardiology- Dr. Adra Lundborg -5/3 at 3  Pulmonary- Dr. Mariana Carlton- 5/3 at 1:45. Hem-Oncology- Dr. Karli Ivory, saw on  and next was on  - saw. CTN provided contact information for future needs. Plan for follow-up call in 3-5 days based on severity of symptoms and risk factors. Plan for next call:   Assess current symptoms, including daily monitoring items  Response with resumption of lasix 40 mg daily  Lab work and plan from oncology. Ask about AMD.       Goals Addressed                   This Visit's Progress       General     Reduce risk for hospitalization        23- reached Ms. Michael Mazariegos.  She reports rare cough, not productive. Note- CTN can hear \"loose secretion in her voice\" during conversation. She had been out of lasix meds, the PCP did write script for her yesterday- she has been taking 40 mg daily- using 2-20 mg tablets. Encouraged her to ask cardiology for a current RX of dose he wants her to take, which she states is 40 mg daily. She is seeing both cardiology and pulmonary in same day on 5/3/23. She is getting lab work drawn ordered by oncology on 4/26/23- office closed earlier today. [facility]     4/24/23- attempted to reach patient, rang with no answer, no VM . Sent message via [address] Review of EMR shows: she had cancelled 4/10 and rescheduled to 4/17, cancelled and rescheduled till 4/24- attended with provider in PCP office. VS- 138/68, HR 81, 98%, wt- 221 lbs. He documented:  Cardiovascular:      Rate and Rhythm: Normal rate and regular rhythm. Heart sounds: Normal heart sounds. No murmur heard. Pulmonary:      Effort: Respiratory distress (very mind with resting) present. Breath sounds: Normal breath sounds. No wheezing or rhonchi. Gait abnormal (using walker and uses arms to stand from seated position. ). [facility]     4/14/23- spoke with Ms. Montrell Marcano. She changed PCP appt to next week, too early in the day. Now seeing on 4/17 at 1:30. She feels she is making progress, completed abx- cough and SOB better. Using O2 PRN. Monitoring sats- 95-96% on RA. Sleeping pretty well. Has gained almost 4 lbs since discharge, weighing every few days-asked her to monitor closer, may need to reach out to cardiology for guidance. She reports she is taking 40 mg furosemide daily not 20 mg- cardiology had told her she could do that dose. Reports ankle and foot edema- asked her to elevate when possible. Discussion about NA intake: 2000 mg per day, in the setting of eating low NA- drink fluids- water - 64 ounces per day to help reduce edema.     She is not checking BP/HR and BG values- \"as often I should be\". Explained writing down values will help with adjusting treatment. Had chemo last week on 4/5- provider changing from tablets to IV due to continued Rash. Next TX on 4/19. [facility]     4/4/23- spoke with Ms. Dolly Rudolph, she reports no audible wheezing, note moist cough during conversation. She states she feels much better. Reports that she does not need oxygen. Confirms she is still smoking. 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 She checks BG values at home, can check BP and HR. Reviewed contacting cardiology if HR in 40's. She does have some dizziness-lightheadedness, no change. She has not picked up new medications:   Complete 5 day course of doxycycline BID. New Eliquis 5 mg BID. She understands to monitor for bleeding. She had pepcid at home to take. Will  RX today from pharmacy, she received notification they are ready. Reminded her they are BID. Discussion about follow up visits: shared the nephrology had written she should follow up OP-she will call them to check. She will check with Hem-Oncology about scheduled treatment for tomorrow.     LLC

## 2023-04-28 ENCOUNTER — PATIENT OUTREACH (OUTPATIENT)
Dept: CASE MANAGEMENT | Age: 73
End: 2023-04-28

## 2023-04-28 NOTE — PROGRESS NOTES
Care Transitions Follow Up Call    Patient Current Location: [address] [place] [address] to be reviewed by the provider   Additional needs identified to be addressed with provider: no  She states she will call oncology to discuss diarrhea if it does not resolve. Method of communication with provider : none    Care Transition Nurse (CTN) contacted the patient by telephone to follow up after admission on 3/29/23. Verified name and  with patient as identifiers. Addressed changes since last contact: refer to above yellow box and goals section    CTN reviewed current symptoms, discharge instructions, medical action plan and red flags with patient and discussed any barriers to care and/or understanding of plan of care after discharge. Discussed appropriate site of care based on symptoms and resources available to patient including: Specialist, After hours contact number- , and Urgent Care Clinics. The patient agrees to contact the PCP and/or specialist office for questions related to their healthcare. [address] follow up appointment(s): No future appointments. Non-[facility] follow up appointment(s):   Hem-Oncology- 5/3/23  Cardiology- Dr. Cheryl Whittington- 5/3 at 3  Pulmonary- Dr. Teagan Mccormick- 5/3 at 1:45. CTN provided contact information for future needs. Plan for follow-up call in 3-5 days based on severity of symptoms and risk factors. Plan for next call:   Assess current symptoms, follow up on diarrhea-hydration  Clarify appts for next week- especially with pulm and card. Ask about AMD        Goals Addressed                   This Visit's Progress       General     Reduce risk for hospitalization        23- reached Ms. Elio Lam, she sounds much clearer with breathing; since resuming lasix 40 mg daily.  She has developed diarrhea, she is monitoring, has had this SE with chemo in the past, she will monitor hydration, encouraged her to reach out to Hem-Oncology for guidance, asked her if she has tried immodium to [email or web address]. [facility]    4/25/23- reached Ms. Mikie Medina. She reports rare cough, not productive. Note- CTN can hear \"loose secretion in her voice\" during conversation. She had been out of lasix meds, the PCP did write script for her yesterday- she has been taking 40 mg daily- using 2-20 mg tablets. Encouraged her to ask cardiology for a current RX of dose he wants her to take, which she states is 40 mg daily. She is seeing both cardiology and pulmonary in same day on 5/3/23. She is getting lab work drawn ordered by oncology on 4/26/23- office closed earlier today. [facility]     4/24/23- attempted to reach patient, rang with no answer, no VM . Sent message via [address] Review of EMR shows: she had cancelled 4/10 and rescheduled to 4/17, cancelled and rescheduled till 4/24- attended with provider in PCP office. VS- 138/68, HR 81, 98%, wt- 221 lbs. He documented:  Cardiovascular:      Rate and Rhythm: Normal rate and regular rhythm. Heart sounds: Normal heart sounds. No murmur heard. Pulmonary:      Effort: Respiratory distress (very mind with resting) present. Breath sounds: Normal breath sounds. No wheezing or rhonchi. Gait abnormal (using walker and uses arms to stand from seated position. ). [facility]     4/14/23- spoke with Ms. Mikie Medina. She changed PCP appt to next week, too early in the day. Now seeing on 4/17 at 1:30. She feels she is making progress, completed abx- cough and SOB better. Using O2 PRN. Monitoring sats- 95-96% on RA. Sleeping pretty well. Has gained almost 4 lbs since discharge, weighing every few days-asked her to monitor closer, may need to reach out to cardiology for guidance. She reports she is taking 40 mg furosemide daily not 20 mg- cardiology had told her she could do that dose. Reports ankle and foot edema- asked her to elevate when possible.  Discussion about NA intake: 2000 mg per day, in the setting of eating low NA- drink fluids- water - 64 ounces per day to help reduce edema.    She is not checking BP/HR and BG values- \"as often I should be\". Explained writing down values will help with adjusting treatment. Had chemo last week on 4/5- provider changing from tablets to IV due to continued Rash. Next TX on 4/19. [facility]     4/4/23- spoke with Ms. Maya Jin, she reports no audible wheezing, note moist cough during conversation. She states she feels much better. Reports that she does not need oxygen. Confirms she is still smoking. 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 She checks BG values at home, can check BP and HR. Reviewed contacting cardiology if HR in 40's. She does have some dizziness-lightheadedness, no change. She has not picked up new medications:   Complete 5 day course of doxycycline BID. New Eliquis 5 mg BID. She understands to monitor for bleeding. She had pepcid at home to take. Will  RX today from pharmacy, she received notification they are ready. Reminded her they are BID. Discussion about follow up visits: shared the nephrology had written she should follow up OP-she will call them to check. She will check with Hem-Oncology about scheduled treatment for tomorrow.     [employer]

## 2023-05-03 ENCOUNTER — PATIENT OUTREACH (OUTPATIENT)
Dept: CASE MANAGEMENT | Age: 73
End: 2023-05-03

## 2023-05-22 DIAGNOSIS — I10 ESSENTIAL (PRIMARY) HYPERTENSION: Primary | ICD-10-CM

## 2023-05-22 DIAGNOSIS — F41.1 GENERALIZED ANXIETY DISORDER: ICD-10-CM

## 2023-05-22 RX ORDER — AMLODIPINE BESYLATE 10 MG/1
10 TABLET ORAL DAILY
Qty: 30 TABLET | Refills: 0 | Status: SHIPPED | OUTPATIENT
Start: 2023-05-22

## 2023-05-22 RX ORDER — AMLODIPINE BESYLATE 10 MG/1
TABLET ORAL
Qty: 90 TABLET | OUTPATIENT
Start: 2023-05-22

## 2023-05-22 RX ORDER — VENLAFAXINE HYDROCHLORIDE 150 MG/1
150 CAPSULE, EXTENDED RELEASE ORAL DAILY
Qty: 30 CAPSULE | Refills: 0 | Status: SHIPPED | OUTPATIENT
Start: 2023-05-22

## 2023-05-22 RX ORDER — VENLAFAXINE HYDROCHLORIDE 150 MG/1
CAPSULE, EXTENDED RELEASE ORAL
Qty: 90 CAPSULE | OUTPATIENT
Start: 2023-05-22

## 2023-05-22 NOTE — TELEPHONE ENCOUNTER
Requested Prescriptions     Pending Prescriptions Disp Refills    amLODIPine (NORVASC) 10 MG tablet [Pharmacy Med Name: AMLODIPINE BESYLATE 10MG TABLETS] 90 tablet      Sig: TAKE 1 TABLET BY MOUTH DAILY    venlafaxine (EFFEXOR XR) 150 MG extended release capsule [Pharmacy Med Name: VENLAFAXINE ER 150MG CAPSULES] 90 capsule      Sig: TAKE 1 CAPSULE BY MOUTH DAILY          Last OV:4/24/23  Next OV:none  Last Refill:        Amlodipine 11/22/22  Qty 90  Refills 1          Venlafaxine 11/22/22  Qty  90  Refills 1

## 2023-05-22 NOTE — TELEPHONE ENCOUNTER
Spoke to patient per missed appointment and denied refill states she is currently having chemotherapy and is often drained/ to tired to venture out     Requesting reconsideration for refills

## 2023-05-23 RX ORDER — HYDROXYZINE PAMOATE 50 MG/1
CAPSULE ORAL
COMMUNITY
Start: 2023-02-27 | End: 2023-05-24 | Stop reason: SDUPTHER

## 2023-05-23 NOTE — TELEPHONE ENCOUNTER
Requested Prescriptions     Pending Prescriptions Disp Refills    hydrOXYzine pamoate (VISTARIL) 50 MG capsule            Last OV:4/24/23  Next OV:none  Last Refill:12/2/22 historical provider

## 2023-05-24 RX ORDER — ASPIRIN 81 MG/1
81 TABLET ORAL DAILY
COMMUNITY

## 2023-05-24 RX ORDER — LEVOTHYROXINE SODIUM 0.1 MG/1
100 TABLET ORAL
COMMUNITY
Start: 2023-02-03 | End: 2023-07-20

## 2023-05-24 RX ORDER — ANASTROZOLE 1 MG/1
1 TABLET ORAL DAILY
COMMUNITY

## 2023-05-24 RX ORDER — SENNOSIDES 8.6 MG
650 CAPSULE ORAL
COMMUNITY
Start: 2023-03-19

## 2023-05-24 RX ORDER — DEXAMETHASONE 4 MG/1
40 TABLET ORAL
COMMUNITY

## 2023-05-24 RX ORDER — FUROSEMIDE 40 MG/1
40 TABLET ORAL DAILY
COMMUNITY
Start: 2023-04-24

## 2023-05-24 RX ORDER — HYDROCODONE BITARTRATE AND ACETAMINOPHEN 5; 325 MG/1; MG/1
1 TABLET ORAL 4 TIMES DAILY PRN
COMMUNITY
Start: 2023-03-06

## 2023-05-24 RX ORDER — AMLODIPINE BESYLATE 10 MG/1
10 TABLET ORAL DAILY
COMMUNITY
Start: 2022-11-22 | End: 2023-07-20

## 2023-05-24 RX ORDER — ROSUVASTATIN CALCIUM 40 MG/1
TABLET, COATED ORAL
COMMUNITY
Start: 2023-03-22

## 2023-05-24 RX ORDER — HYDROXYZINE PAMOATE 50 MG/1
CAPSULE ORAL
Qty: 90 CAPSULE | Refills: 0 | Status: SHIPPED | OUTPATIENT
Start: 2023-05-24

## 2023-05-24 RX ORDER — LENALIDOMIDE 10 MG/1
CAPSULE ORAL
COMMUNITY

## 2023-05-24 RX ORDER — GLIPIZIDE 5 MG/1
TABLET ORAL
COMMUNITY
Start: 2023-02-03 | End: 2023-07-20

## 2023-05-24 RX ORDER — ACYCLOVIR 400 MG/1
400 TABLET ORAL 2 TIMES DAILY
COMMUNITY
Start: 2023-03-29

## 2023-05-24 RX ORDER — VENLAFAXINE HYDROCHLORIDE 150 MG/1
150 CAPSULE, EXTENDED RELEASE ORAL DAILY
COMMUNITY
Start: 2022-11-22 | End: 2023-06-19 | Stop reason: SDUPTHER

## 2023-05-24 RX ORDER — FAMOTIDINE 10 MG
10 TABLET ORAL PRN
COMMUNITY
Start: 2023-04-03

## 2023-05-24 RX ORDER — METOPROLOL SUCCINATE 50 MG/1
50 TABLET, EXTENDED RELEASE ORAL DAILY
COMMUNITY
Start: 2023-04-03

## 2023-05-24 RX ORDER — HYDROXYZINE PAMOATE 50 MG/1
CAPSULE ORAL
COMMUNITY
Start: 2022-12-02

## 2023-05-24 RX ORDER — OMEPRAZOLE 20 MG/1
20 CAPSULE, DELAYED RELEASE ORAL 2 TIMES DAILY
COMMUNITY
Start: 2023-03-29

## 2023-05-24 RX ORDER — ONDANSETRON 4 MG/1
TABLET, ORALLY DISINTEGRATING ORAL
COMMUNITY
Start: 2023-03-29

## 2023-05-24 RX ORDER — FLUTICASONE FUROATE, UMECLIDINIUM BROMIDE AND VILANTEROL TRIFENATATE 100; 62.5; 25 UG/1; UG/1; UG/1
1 POWDER RESPIRATORY (INHALATION) DAILY
COMMUNITY
Start: 2021-10-28

## 2023-06-01 ENCOUNTER — TELEPHONE (OUTPATIENT)
Facility: CLINIC | Age: 73
End: 2023-06-01

## 2023-06-01 NOTE — TELEPHONE ENCOUNTER
[facility] faxed over a denial for the medication Hydroxyzine. Scanned paperwork into  and gave copy to clinical staff.

## 2023-06-19 DIAGNOSIS — I10 ESSENTIAL (PRIMARY) HYPERTENSION: ICD-10-CM

## 2023-06-19 RX ORDER — AMLODIPINE BESYLATE 10 MG/1
10 TABLET ORAL DAILY
Qty: 30 TABLET | Refills: 0 | Status: SHIPPED | OUTPATIENT
Start: 2023-06-19

## 2023-06-19 RX ORDER — AMLODIPINE BESYLATE 10 MG/1
10 TABLET ORAL DAILY
Qty: 90 TABLET | OUTPATIENT
Start: 2023-06-19

## 2023-06-19 RX ORDER — VENLAFAXINE HYDROCHLORIDE 150 MG/1
150 CAPSULE, EXTENDED RELEASE ORAL DAILY
Qty: 30 CAPSULE | Refills: 1 | Status: SHIPPED | OUTPATIENT
Start: 2023-06-19

## 2023-06-19 NOTE — TELEPHONE ENCOUNTER
Requested Prescriptions     Pending Prescriptions Disp Refills    amLODIPine (NORVASC) 10 MG tablet [Pharmacy Med Name: AMLODIPINE BESYLATE 10MG TABLETS] 90 tablet      Sig: TAKE 1 TABLET BY MOUTH DAILY     Last OV:04/24/2023  Next OV:none  Last Refill:done  Last (labs):    -*Insert any notes here*

## 2023-06-19 NOTE — TELEPHONE ENCOUNTER
Requested Prescriptions     Pending Prescriptions Disp Refills    amLODIPine (NORVASC) 10 MG tablet 30 tablet 0     Sig: Take 1 tablet by mouth daily    venlafaxine (EFFEXOR XR) 150 MG extended release capsule 30 capsule      Sig: Take 1 capsule by mouth daily          Last OV:4/24/23  Next OV:none  Last Refill:          Amlodipine 11/22/22  Qty 90  Refills 1          Venlafaxine 11/22/22  Qty 90  Refills 1

## 2023-06-19 NOTE — TELEPHONE ENCOUNTER
Alexandro sent in a prescription refill request for the below:    Amlodipine Besylate 10 mg tablets    Venlafaxine  mg capsules

## 2023-07-18 DIAGNOSIS — I10 ESSENTIAL (PRIMARY) HYPERTENSION: ICD-10-CM

## 2023-07-18 NOTE — TELEPHONE ENCOUNTER
Requested Prescriptions     Pending Prescriptions Disp Refills    amLODIPine (NORVASC) 10 MG tablet [Pharmacy Med Name: AMLODIPINE BESYLATE 10MG TABLETS] 30 tablet 0     Sig: TAKE 1 TABLET BY MOUTH DAILY     Last OV:04/24/2023  Next OV:none  Last Refill:06/19/2023  Last (labs):04//03/2023    -*Insert any notes here*

## 2023-07-18 NOTE — TELEPHONE ENCOUNTER
Requested Prescriptions     Pending Prescriptions Disp Refills    glipiZIDE (GLUCOTROL) 5 MG tablet [Pharmacy Med Name: GLIPIZIDE 5MG TABLETS] 90 tablet      Sig: TAKE 1 TABLET BY MOUTH DAILY 30 MINUTES BEFORE BREAKFAST FOR DIABETES    levothyroxine (SYNTHROID) 100 MCG tablet [Pharmacy Med Name: LEVOTHYROXINE 0.100MG (100MCG) TAB] 90 tablet      Sig: TAKE 1 TABLET BY MOUTH DAILY BEFORE BREAKFAST FOR HYPOTHYROIDISM     Last OV:04/24/2023  Next OV:none  Last Refill:02/03/2023  Last (labs):04/03/2023    -*Insert any notes here*

## 2023-07-20 RX ORDER — LEVOTHYROXINE SODIUM 0.1 MG/1
TABLET ORAL
Qty: 30 TABLET | Refills: 0 | Status: SHIPPED | OUTPATIENT
Start: 2023-07-20 | End: 2023-07-20

## 2023-07-20 RX ORDER — LEVOTHYROXINE SODIUM 0.1 MG/1
TABLET ORAL
Qty: 90 TABLET | Refills: 0 | Status: SHIPPED | OUTPATIENT
Start: 2023-07-20

## 2023-07-20 RX ORDER — GLIPIZIDE 5 MG/1
TABLET ORAL
Qty: 90 TABLET | Refills: 0 | Status: SHIPPED | OUTPATIENT
Start: 2023-07-20

## 2023-07-20 RX ORDER — AMLODIPINE BESYLATE 10 MG/1
10 TABLET ORAL DAILY
Qty: 90 TABLET | Refills: 0 | Status: SHIPPED | OUTPATIENT
Start: 2023-07-20

## 2023-07-20 NOTE — TELEPHONE ENCOUNTER
Requested Prescriptions     Pending Prescriptions Disp Refills    levothyroxine (SYNTHROID) 100 MCG tablet [Pharmacy Med Name: LEVOTHYROXINE 0.100MG (100MCG) TAB] 90 tablet      Sig: TAKE 1 TABLET BY MOUTH DAILY BEFORE BREAKFAST FOR HYPOTHYROIDISM

## 2023-08-15 NOTE — TELEPHONE ENCOUNTER
Last OV:04/24/2023  Next OV:09/12/2023  Last Refill:06/19/2023  Last (labs):04/03/2023    -*Insert any notes here*  Requested Prescriptions     Pending Prescriptions Disp Refills    venlafaxine (EFFEXOR XR) 150 MG extended release capsule [Pharmacy Med Name: VENLAFAXINE ER 150MG CAPSULES] 30 capsule 1     Sig: TAKE 1 CAPSULE BY MOUTH DAILY

## 2023-08-16 RX ORDER — VENLAFAXINE HYDROCHLORIDE 150 MG/1
150 CAPSULE, EXTENDED RELEASE ORAL DAILY
Qty: 90 CAPSULE | Refills: 1 | Status: SHIPPED | OUTPATIENT
Start: 2023-08-16

## 2023-09-14 NOTE — TELEPHONE ENCOUNTER
Requested Prescriptions     Pending Prescriptions Disp Refills    rosuvastatin (CRESTOR) 40 MG tablet [Pharmacy Med Name: ROSUVASTATIN 40MG TABLETS] 90 tablet      Sig: TAKE 1 TABLET BY MOUTH EVERY NIGHT FOR HIGH CHOLESTEROL

## 2023-09-14 NOTE — TELEPHONE ENCOUNTER
Requested Prescriptions     Pending Prescriptions Disp Refills    JARDIANCE 10 MG tablet [Pharmacy Med Name: JARDIANCE 10MG TABLETS] 90 tablet      Sig: TAKE 1 TABLET BY MOUTH DAILY

## 2023-09-15 RX ORDER — EMPAGLIFLOZIN 10 MG/1
10 TABLET, FILM COATED ORAL DAILY
Qty: 90 TABLET | Refills: 1 | Status: SHIPPED | OUTPATIENT
Start: 2023-09-15

## 2023-09-15 RX ORDER — ROSUVASTATIN CALCIUM 40 MG/1
TABLET, COATED ORAL
Qty: 90 TABLET | Refills: 1 | Status: SHIPPED | OUTPATIENT
Start: 2023-09-15

## 2023-09-30 SDOH — ECONOMIC STABILITY: FOOD INSECURITY: WITHIN THE PAST 12 MONTHS, YOU WORRIED THAT YOUR FOOD WOULD RUN OUT BEFORE YOU GOT MONEY TO BUY MORE.: NEVER TRUE

## 2023-09-30 SDOH — ECONOMIC STABILITY: TRANSPORTATION INSECURITY
IN THE PAST 12 MONTHS, HAS LACK OF TRANSPORTATION KEPT YOU FROM MEETINGS, WORK, OR FROM GETTING THINGS NEEDED FOR DAILY LIVING?: NO

## 2023-09-30 SDOH — ECONOMIC STABILITY: INCOME INSECURITY: HOW HARD IS IT FOR YOU TO PAY FOR THE VERY BASICS LIKE FOOD, HOUSING, MEDICAL CARE, AND HEATING?: NOT VERY HARD

## 2023-09-30 SDOH — ECONOMIC STABILITY: HOUSING INSECURITY
IN THE LAST 12 MONTHS, WAS THERE A TIME WHEN YOU DID NOT HAVE A STEADY PLACE TO SLEEP OR SLEPT IN A SHELTER (INCLUDING NOW)?: NO

## 2023-09-30 SDOH — ECONOMIC STABILITY: FOOD INSECURITY: WITHIN THE PAST 12 MONTHS, THE FOOD YOU BOUGHT JUST DIDN'T LAST AND YOU DIDN'T HAVE MONEY TO GET MORE.: NEVER TRUE

## 2023-09-30 ASSESSMENT — PATIENT HEALTH QUESTIONNAIRE - PHQ9
6. FEELING BAD ABOUT YOURSELF - OR THAT YOU ARE A FAILURE OR HAVE LET YOURSELF OR YOUR FAMILY DOWN: 0
5. POOR APPETITE OR OVEREATING: SEVERAL DAYS
SUM OF ALL RESPONSES TO PHQ QUESTIONS 1-9: 9
9. THOUGHTS THAT YOU WOULD BE BETTER OFF DEAD, OR OF HURTING YOURSELF: 0
1. LITTLE INTEREST OR PLEASURE IN DOING THINGS: 1
4. FEELING TIRED OR HAVING LITTLE ENERGY: NEARLY EVERY DAY
6. FEELING BAD ABOUT YOURSELF - OR THAT YOU ARE A FAILURE OR HAVE LET YOURSELF OR YOUR FAMILY DOWN: NOT AT ALL
3. TROUBLE FALLING OR STAYING ASLEEP: NEARLY EVERY DAY
10. IF YOU CHECKED OFF ANY PROBLEMS, HOW DIFFICULT HAVE THESE PROBLEMS MADE IT FOR YOU TO DO YOUR WORK, TAKE CARE OF THINGS AT HOME, OR GET ALONG WITH OTHER PEOPLE: 0
SUM OF ALL RESPONSES TO PHQ QUESTIONS 1-9: 9
2. FEELING DOWN, DEPRESSED OR HOPELESS: 0
SUM OF ALL RESPONSES TO PHQ9 QUESTIONS 1 & 2: 1
2. FEELING DOWN, DEPRESSED OR HOPELESS: NOT AT ALL
9. THOUGHTS THAT YOU WOULD BE BETTER OFF DEAD, OR OF HURTING YOURSELF: NOT AT ALL
1. LITTLE INTEREST OR PLEASURE IN DOING THINGS: SEVERAL DAYS
SUM OF ALL RESPONSES TO PHQ QUESTIONS 1-9: 9
3. TROUBLE FALLING OR STAYING ASLEEP: 3
SUM OF ALL RESPONSES TO PHQ QUESTIONS 1-9: 9
5. POOR APPETITE OR OVEREATING: 1
10. IF YOU CHECKED OFF ANY PROBLEMS, HOW DIFFICULT HAVE THESE PROBLEMS MADE IT FOR YOU TO DO YOUR WORK, TAKE CARE OF THINGS AT HOME, OR GET ALONG WITH OTHER PEOPLE: NOT DIFFICULT AT ALL
7. TROUBLE CONCENTRATING ON THINGS, SUCH AS READING THE NEWSPAPER OR WATCHING TELEVISION: SEVERAL DAYS
7. TROUBLE CONCENTRATING ON THINGS, SUCH AS READING THE NEWSPAPER OR WATCHING TELEVISION: 1
4. FEELING TIRED OR HAVING LITTLE ENERGY: 3
SUM OF ALL RESPONSES TO PHQ QUESTIONS 1-9: 9
8. MOVING OR SPEAKING SO SLOWLY THAT OTHER PEOPLE COULD HAVE NOTICED. OR THE OPPOSITE, BEING SO FIGETY OR RESTLESS THAT YOU HAVE BEEN MOVING AROUND A LOT MORE THAN USUAL: 0
8. MOVING OR SPEAKING SO SLOWLY THAT OTHER PEOPLE COULD HAVE NOTICED. OR THE OPPOSITE - BEING SO FIDGETY OR RESTLESS THAT YOU HAVE BEEN MOVING AROUND A LOT MORE THAN USUAL: NOT AT ALL

## 2023-10-02 ENCOUNTER — OFFICE VISIT (OUTPATIENT)
Facility: CLINIC | Age: 73
End: 2023-10-02
Payer: MEDICARE

## 2023-10-02 VITALS
DIASTOLIC BLOOD PRESSURE: 64 MMHG | HEIGHT: 64 IN | WEIGHT: 198 LBS | OXYGEN SATURATION: 99 % | RESPIRATION RATE: 18 BRPM | TEMPERATURE: 97.5 F | BODY MASS INDEX: 33.8 KG/M2 | SYSTOLIC BLOOD PRESSURE: 131 MMHG | HEART RATE: 88 BPM

## 2023-10-02 DIAGNOSIS — D61.9 ANEMIA DUE TO BONE MARROW FAILURE, UNSPECIFIED BONE MARROW FAILURE TYPE (HCC): ICD-10-CM

## 2023-10-02 DIAGNOSIS — J44.9 CHRONIC OBSTRUCTIVE PULMONARY DISEASE, UNSPECIFIED COPD TYPE (HCC): ICD-10-CM

## 2023-10-02 DIAGNOSIS — G47.30 SLEEP APNEA, UNSPECIFIED TYPE: ICD-10-CM

## 2023-10-02 DIAGNOSIS — C90.00 MULTIPLE MYELOMA, REMISSION STATUS UNSPECIFIED (HCC): ICD-10-CM

## 2023-10-02 DIAGNOSIS — E11.22 TYPE 2 DIABETES MELLITUS WITH STAGE 3A CHRONIC KIDNEY DISEASE, WITHOUT LONG-TERM CURRENT USE OF INSULIN (HCC): Primary | ICD-10-CM

## 2023-10-02 DIAGNOSIS — N18.31 TYPE 2 DIABETES MELLITUS WITH STAGE 3A CHRONIC KIDNEY DISEASE, WITHOUT LONG-TERM CURRENT USE OF INSULIN (HCC): Primary | ICD-10-CM

## 2023-10-02 DIAGNOSIS — I10 ESSENTIAL HYPERTENSION: ICD-10-CM

## 2023-10-02 DIAGNOSIS — G47.9 SLEEP DISTURBANCE: ICD-10-CM

## 2023-10-02 DIAGNOSIS — E03.9 HYPOTHYROIDISM, UNSPECIFIED TYPE: ICD-10-CM

## 2023-10-02 PROBLEM — M17.12 ARTHRITIS OF LEFT KNEE: Status: ACTIVE | Noted: 2023-10-02

## 2023-10-02 PROBLEM — C79.51 MALIGNANT NEOPLASM METASTATIC TO BONE (HCC): Status: RESOLVED | Noted: 2023-10-02 | Resolved: 2023-10-02

## 2023-10-02 PROBLEM — M84.419D: Status: ACTIVE | Noted: 2023-10-02

## 2023-10-02 PROBLEM — N17.9 AKI (ACUTE KIDNEY INJURY) (HCC): Status: ACTIVE | Noted: 2023-10-02

## 2023-10-02 PROBLEM — J44.1 COPD WITH ACUTE EXACERBATION (HCC): Status: RESOLVED | Noted: 2023-03-29 | Resolved: 2023-10-02

## 2023-10-02 PROBLEM — S46.919A SHOULDER STRAIN: Status: ACTIVE | Noted: 2023-10-02

## 2023-10-02 PROBLEM — C79.51 MALIGNANT NEOPLASM METASTATIC TO BONE (HCC): Status: ACTIVE | Noted: 2023-10-02

## 2023-10-02 PROBLEM — S46.919A SHOULDER STRAIN: Status: RESOLVED | Noted: 2023-10-02 | Resolved: 2023-10-02

## 2023-10-02 PROBLEM — C80.1 MALIGNANT NEOPLASM (HCC): Status: ACTIVE | Noted: 2023-10-02

## 2023-10-02 PROBLEM — M25.512 LEFT SHOULDER PAIN: Status: ACTIVE | Noted: 2023-10-02

## 2023-10-02 PROBLEM — C80.1 MALIGNANT NEOPLASM (HCC): Status: RESOLVED | Noted: 2023-10-02 | Resolved: 2023-10-02

## 2023-10-02 PROBLEM — M25.512 LEFT SHOULDER PAIN: Status: RESOLVED | Noted: 2023-10-02 | Resolved: 2023-10-02

## 2023-10-02 PROBLEM — N17.9 AKI (ACUTE KIDNEY INJURY) (HCC): Status: RESOLVED | Noted: 2023-10-02 | Resolved: 2023-10-02

## 2023-10-02 LAB — HBA1C MFR BLD: 5.6 %

## 2023-10-02 PROCEDURE — G8400 PT W/DXA NO RESULTS DOC: HCPCS | Performed by: FAMILY MEDICINE

## 2023-10-02 PROCEDURE — 3023F SPIROM DOC REV: CPT | Performed by: FAMILY MEDICINE

## 2023-10-02 PROCEDURE — 3075F SYST BP GE 130 - 139MM HG: CPT | Performed by: FAMILY MEDICINE

## 2023-10-02 PROCEDURE — 1090F PRES/ABSN URINE INCON ASSESS: CPT | Performed by: FAMILY MEDICINE

## 2023-10-02 PROCEDURE — 3052F HG A1C>EQUAL 8.0%<EQUAL 9.0%: CPT | Performed by: FAMILY MEDICINE

## 2023-10-02 PROCEDURE — 99214 OFFICE O/P EST MOD 30 MIN: CPT | Performed by: FAMILY MEDICINE

## 2023-10-02 PROCEDURE — 3017F COLORECTAL CA SCREEN DOC REV: CPT | Performed by: FAMILY MEDICINE

## 2023-10-02 PROCEDURE — G8427 DOCREV CUR MEDS BY ELIG CLIN: HCPCS | Performed by: FAMILY MEDICINE

## 2023-10-02 PROCEDURE — 83036 HEMOGLOBIN GLYCOSYLATED A1C: CPT | Performed by: FAMILY MEDICINE

## 2023-10-02 PROCEDURE — G8417 CALC BMI ABV UP PARAM F/U: HCPCS | Performed by: FAMILY MEDICINE

## 2023-10-02 PROCEDURE — 2022F DILAT RTA XM EVC RTNOPTHY: CPT | Performed by: FAMILY MEDICINE

## 2023-10-02 PROCEDURE — 3078F DIAST BP <80 MM HG: CPT | Performed by: FAMILY MEDICINE

## 2023-10-02 PROCEDURE — 4004F PT TOBACCO SCREEN RCVD TLK: CPT | Performed by: FAMILY MEDICINE

## 2023-10-02 PROCEDURE — 1123F ACP DISCUSS/DSCN MKR DOCD: CPT | Performed by: FAMILY MEDICINE

## 2023-10-02 PROCEDURE — G8484 FLU IMMUNIZE NO ADMIN: HCPCS | Performed by: FAMILY MEDICINE

## 2023-10-02 RX ORDER — METOPROLOL SUCCINATE 50 MG/1
50 TABLET, EXTENDED RELEASE ORAL DAILY
Qty: 30 TABLET | Refills: 1 | Status: SHIPPED | OUTPATIENT
Start: 2023-10-02

## 2023-10-02 RX ORDER — GABAPENTIN 100 MG/1
100 CAPSULE ORAL 2 TIMES DAILY
COMMUNITY

## 2023-10-02 SDOH — ECONOMIC STABILITY: TRANSPORTATION INSECURITY
IN THE PAST 12 MONTHS, HAS THE LACK OF TRANSPORTATION KEPT YOU FROM MEDICAL APPOINTMENTS OR FROM GETTING MEDICATIONS?: NO

## 2023-10-02 SDOH — ECONOMIC STABILITY: HOUSING INSECURITY: IN THE LAST 12 MONTHS, HOW MANY PLACES HAVE YOU LIVED?: 1

## 2023-10-02 SDOH — ECONOMIC STABILITY: INCOME INSECURITY: IN THE LAST 12 MONTHS, WAS THERE A TIME WHEN YOU WERE NOT ABLE TO PAY THE MORTGAGE OR RENT ON TIME?: NO

## 2023-10-02 ASSESSMENT — SOCIAL DETERMINANTS OF HEALTH (SDOH)
HOW OFTEN DO YOU ATTEND CHURCH OR RELIGIOUS SERVICES?: NEVER
IN A TYPICAL WEEK, HOW MANY TIMES DO YOU TALK ON THE PHONE WITH FAMILY, FRIENDS, OR NEIGHBORS?: MORE THAN THREE TIMES A WEEK
DO YOU BELONG TO ANY CLUBS OR ORGANIZATIONS SUCH AS CHURCH GROUPS UNIONS, FRATERNAL OR ATHLETIC GROUPS, OR SCHOOL GROUPS?: NO
HOW OFTEN DO YOU ATTENT MEETINGS OF THE CLUB OR ORGANIZATION YOU BELONG TO?: NEVER
HOW OFTEN DO YOU GET TOGETHER WITH FRIENDS OR RELATIVES?: ONCE A WEEK

## 2023-10-02 NOTE — PROGRESS NOTES
Subjective  Chief Complaint   Patient presents with    6 Month Follow-Up     HPI:  Adali Dorsey is a 74 y.o. female with medical problems as listed below who presents for follow up of chronic conditions. DM2:  Since last visit: Taking steroid with chemo but not taking on a daily Diagnosed with multiple myeloma recently. She mostly checks her glucose in the morning and it's been under 150. A1c: 7.8 (06/2022)-->7.7 (11/2022)-->8.1 (03/2023). Due today. Lipids: Total 189, HDL 46, LDL 98 (06/2022)  BMP: Cr 1.21 (08/2022)  Urine microalbumin: Wnl (06/2022)  Eye exam: Has seen [facility]/Dr. Les Holt in the last year. Foot exam: Overdue  ASA/statin: Rosuvastatin, ASA  Medications: Januvia 100mg daily, glipizide 5mg daily, Jardiance 10mg daily    Multiple myeloma: On 5th round of chemo. Dr. Alex Mccoy feels good about progress. Taking gabapentin twice daily which is helping with pain. Seeing [facility]. Last appointment in September. Had 30-day monitor which didn't show anything. Hives from head to toe when she had allergic reaction to chemo. She has lingering flaky scalp. Using head and shoulders which isn't helping. Sleeping difficulty: Trazodone helped a little bit. She has tried melatonin, it didn't work.      Patient Active Problem List   Diagnosis    GERD (gastroesophageal reflux disease)    Hypothyroidism    Nontraumatic complete tear of left rotator cuff    Severe obesity (HCC)    Chronic obstructive pulmonary disease ([address])    Essential hypertension    Sleep apnea    Hyperlipidemia    Depression    History of breast cancer    Multiple myeloma ([address])    Left renal mass    Type 2 diabetes mellitus with stage 3a chronic kidney disease, without long-term current use of insulin (HCC)    Chronic systolic (congestive) heart failure (HCC)    Arthritis of left knee    Pathologic fracture of clavicle with routine healing    Anemia due to bone marrow failure ([address])    Sleep disturbance

## 2023-10-08 DIAGNOSIS — I10 ESSENTIAL (PRIMARY) HYPERTENSION: ICD-10-CM

## 2023-10-09 NOTE — TELEPHONE ENCOUNTER
Requested Prescriptions     Pending Prescriptions Disp Refills    amLODIPine (NORVASC) 10 MG tablet [Pharmacy Med Name: AMLODIPINE BESYLATE 10MG TABLETS] 90 tablet 0     Sig: TAKE 1 TABLET BY MOUTH DAILY    glipiZIDE (GLUCOTROL) 5 MG tablet [Pharmacy Med Name: GLIPIZIDE 5MG TABLETS] 90 tablet 0     Sig: TAKE 1 TABLET BY MOUTH DAILY 30 MINUTES BEFORE BREAKFAST FOR DIABETES    levothyroxine (SYNTHROID) 100 MCG tablet [Pharmacy Med Name: LEVOTHYROXINE 0.100MG (100MCG) TAB] 90 tablet 0     Sig: TAKE 1 TABLET BY MOUTH DAILY BEFORE BREAKFAST FOR HYPOTHYROIDISM

## 2023-10-10 RX ORDER — AMLODIPINE BESYLATE 10 MG/1
10 TABLET ORAL DAILY
Qty: 90 TABLET | Refills: 3 | Status: SHIPPED | OUTPATIENT
Start: 2023-10-10

## 2023-10-10 RX ORDER — LEVOTHYROXINE SODIUM 0.1 MG/1
TABLET ORAL
Qty: 90 TABLET | Refills: 0 | Status: SHIPPED | OUTPATIENT
Start: 2023-10-10

## 2023-10-10 RX ORDER — GLIPIZIDE 5 MG/1
TABLET ORAL
Qty: 90 TABLET | Refills: 0 | OUTPATIENT
Start: 2023-10-10

## 2023-10-10 NOTE — TELEPHONE ENCOUNTER
Glipizide was discontinued. 90-day supply of levothyroxine was sent to pharmacy, but patient still has not completed labwork. Please remind her to get that done. 1year supply of amlodipine sent.

## 2023-11-02 ENCOUNTER — ANESTHESIA (OUTPATIENT)
Facility: HOSPITAL | Age: 73
End: 2023-11-02
Payer: MEDICARE

## 2023-11-02 ENCOUNTER — ANESTHESIA EVENT (OUTPATIENT)
Facility: HOSPITAL | Age: 73
End: 2023-11-02
Payer: MEDICARE

## 2023-11-02 ENCOUNTER — HOSPITAL ENCOUNTER (OUTPATIENT)
Facility: HOSPITAL | Age: 73
Setting detail: OUTPATIENT SURGERY
Discharge: HOME | End: 2023-11-02
Attending: INTERNAL MEDICINE | Admitting: INTERNAL MEDICINE
Payer: MEDICARE

## 2023-11-02 VITALS
HEIGHT: 60 IN | TEMPERATURE: 98.1 F | DIASTOLIC BLOOD PRESSURE: 79 MMHG | BODY MASS INDEX: 37.11 KG/M2 | HEART RATE: 82 BPM | SYSTOLIC BLOOD PRESSURE: 118 MMHG | RESPIRATION RATE: 20 BRPM | WEIGHT: 189 LBS | OXYGEN SATURATION: 99 %

## 2023-11-02 DIAGNOSIS — R93.5 ABNORMAL CT OF THE ABDOMEN: ICD-10-CM

## 2023-11-02 DIAGNOSIS — K21.9 GASTROESOPHAGEAL REFLUX DISEASE, UNSPECIFIED WHETHER ESOPHAGITIS PRESENT: ICD-10-CM

## 2023-11-02 DIAGNOSIS — R10.13 DYSPEPSIA: ICD-10-CM

## 2023-11-02 LAB
GLUCOSE BLD STRIP.AUTO-MCNC: 109 MG/DL (ref 65–100)
PERFORMED BY:: ABNORMAL

## 2023-11-02 PROCEDURE — 6360000002 HC RX W HCPCS: Performed by: NURSE ANESTHETIST, CERTIFIED REGISTERED

## 2023-11-02 PROCEDURE — 2500000003 HC RX 250 WO HCPCS: Performed by: NURSE ANESTHETIST, CERTIFIED REGISTERED

## 2023-11-02 PROCEDURE — 3600007512: Performed by: INTERNAL MEDICINE

## 2023-11-02 PROCEDURE — 3700000001 HC ADD 15 MINUTES (ANESTHESIA): Performed by: INTERNAL MEDICINE

## 2023-11-02 PROCEDURE — 2709999900 HC NON-CHARGEABLE SUPPLY: Performed by: INTERNAL MEDICINE

## 2023-11-02 PROCEDURE — 7100000010 HC PHASE II RECOVERY - FIRST 15 MIN: Performed by: INTERNAL MEDICINE

## 2023-11-02 PROCEDURE — 3700000000 HC ANESTHESIA ATTENDED CARE: Performed by: INTERNAL MEDICINE

## 2023-11-02 PROCEDURE — 82962 GLUCOSE BLOOD TEST: CPT

## 2023-11-02 PROCEDURE — 88305 TISSUE EXAM BY PATHOLOGIST: CPT

## 2023-11-02 PROCEDURE — 3600007502: Performed by: INTERNAL MEDICINE

## 2023-11-02 PROCEDURE — 7100000011 HC PHASE II RECOVERY - ADDTL 15 MIN: Performed by: INTERNAL MEDICINE

## 2023-11-02 PROCEDURE — 2580000003 HC RX 258: Performed by: NURSE ANESTHETIST, CERTIFIED REGISTERED

## 2023-11-02 RX ORDER — SODIUM CHLORIDE, SODIUM LACTATE, POTASSIUM CHLORIDE, CALCIUM CHLORIDE 600; 310; 30; 20 MG/100ML; MG/100ML; MG/100ML; MG/100ML
INJECTION, SOLUTION INTRAVENOUS CONTINUOUS
Status: DISCONTINUED | OUTPATIENT
Start: 2023-11-02 | End: 2023-11-02 | Stop reason: HOSPADM

## 2023-11-02 RX ORDER — SODIUM CHLORIDE, SODIUM LACTATE, POTASSIUM CHLORIDE, CALCIUM CHLORIDE 600; 310; 30; 20 MG/100ML; MG/100ML; MG/100ML; MG/100ML
INJECTION, SOLUTION INTRAVENOUS CONTINUOUS PRN
Status: DISCONTINUED | OUTPATIENT
Start: 2023-11-02 | End: 2023-11-02 | Stop reason: SDUPTHER

## 2023-11-02 RX ADMIN — SODIUM CHLORIDE, POTASSIUM CHLORIDE, SODIUM LACTATE AND CALCIUM CHLORIDE: 600; 310; 30; 20 INJECTION, SOLUTION INTRAVENOUS at 13:11

## 2023-11-02 RX ADMIN — PROPOFOL 50 MG: 10 INJECTION, EMULSION INTRAVENOUS at 13:18

## 2023-11-02 RX ADMIN — PROPOFOL 50 MG: 10 INJECTION, EMULSION INTRAVENOUS at 13:15

## 2023-11-02 RX ADMIN — LIDOCAINE HYDROCHLORIDE 5 ML: 20 INJECTION, SOLUTION EPIDURAL; INFILTRATION; INTRACAUDAL; PERINEURAL at 13:15

## 2023-11-02 ASSESSMENT — PAIN - FUNCTIONAL ASSESSMENT: PAIN_FUNCTIONAL_ASSESSMENT: 0-10

## 2023-11-02 NOTE — ANESTHESIA PRE PROCEDURE
Department of Anesthesiology  Preprocedure Note       Name:  Yoanna Florentino   Age:  74 y.o.  :  1950                                          MRN:  [id number]         Date:  2023      Surgeon: Dang Hawley):  Tim Villagomez MD    Procedure: Procedure(s):  EGD ESOPHAGOGASTRODUODENOSCOPY    Medications prior to admission:   Prior to Admission medications    Medication Sig Start Date End Date Taking? Authorizing Provider   amLODIPine (NORVASC) 10 MG tablet TAKE 1 TABLET BY MOUTH DAILY 10/10/23   Helen Tucker,    levothyroxine (SYNTHROID) 100 MCG tablet TAKE 1 TABLET BY MOUTH DAILY BEFORE BREAKFAST FOR HYPOTHYROIDISM 10/10/23   Helen Tucker DO   apixaban (ELIQUIS) 5 MG TABS tablet Take by mouth 2 times daily    Provider, MD Zuleika   metoprolol succinate (TOPROL XL) 50 MG extended release tablet Take 1 tablet by mouth daily 10/2/23   Helen Tucker DO   gabapentin (NEURONTIN) 100 MG capsule Take 1 capsule by mouth in the morning and at bedtime.     Provider, MD Zuleika   empagliflozin (JARDIANCE) 10 MG tablet TAKE 1 TABLET BY MOUTH DAILY 9/15/23   Helen Tucker DO   rosuvastatin (CRESTOR) 40 MG tablet TAKE 1 TABLET BY MOUTH EVERY NIGHT FOR HIGH CHOLESTEROL 9/15/23   Helen Tucker DO   venlafaxine (EFFEXOR XR) 150 MG extended release capsule TAKE 1 CAPSULE BY MOUTH DAILY 23   Helen Tucker DO   hydrOXYzine pamoate (VISTARIL) 50 MG capsule TAKE 1 TO 2 CAPSULES BY MOUTH EVERY NIGHT 30 MINUTES BEFORE BEDTIME FOR INSOMNIA    Daneen Nyhan, APRN - CNP   acetaminophen (TYLENOL) 650 MG extended release tablet 1 tablet 3/19/23   Automatic Reconciliation, Ar   acyclovir (ZOVIRAX) 400 MG tablet Take 1 tablet by mouth 2 times daily 3/29/23   Automatic Reconciliation, Ar   anastrozole (ARIMIDEX) 1 MG tablet Take 1 tablet by mouth daily    Automatic Reconciliation, Ar   aspirin 81 MG EC tablet Take 1 tablet by mouth daily  Patient not taking: Reported

## 2023-11-02 NOTE — PROGRESS NOTES
Limb alert  applied  to  right  arm   and pink armband  applied  limited   for  iv  sticks  sign  at   head  of  bed

## 2023-11-02 NOTE — PERIOP NOTE
Discharge instructions reviewed with patient and her . Both verbalized understanding. Pt vital signs stable and she shows no signs of distress. Pt ambulated to the bathroom with no difficulty. IV was removed without complication. Per endo nurse, Dr. Jannette Oneal did not need to see patient at bedside. Pt ambulated downstairs with staff and left in a private vehicle with her .

## 2023-11-03 NOTE — ANESTHESIA POSTPROCEDURE EVALUATION
Department of Anesthesiology  Postprocedure Note    Patient: Rosmery Briggs  MRN: [id number]  YOB: 1950  Date of evaluation: 11/3/2023      Procedure Summary     Date: 11/02/23 Room / Location: [facility] ENDO 04 / [facility] ENDOSCOPY    Anesthesia Start: 1311 Anesthesia Stop: 2547    Procedure: EGD ESOPHAGOGASTRODUODENOSCOPY (Upper GI Region) Diagnosis:       Abnormal CT of the abdomen      Dyspepsia      Gastroesophageal reflux disease, unspecified whether esophagitis present      (Abnormal CT of the abdomen [R93.5])      (Dyspepsia [R10.13])      (Gastroesophageal reflux disease, unspecified whether esophagitis present [K21.9])    Surgeons: Lukas Ricci MD Responsible Provider: Donnie Castaneda MD    Anesthesia Type: MAC ASA Status: 3          Anesthesia Type: MAC    Kati Phase I: Kati Score: 10    Kati Phase II: Kati Score: 10      Anesthesia Post Evaluation    Patient location during evaluation: bedside  Patient participation: complete - patient participated  Level of consciousness: awake  Airway patency: patent  Nausea & Vomiting: no nausea and no vomiting  Complications: no  Cardiovascular status: hemodynamically stable  Respiratory status: acceptable  Hydration status: euvolemic  Pain management: adequate

## 2023-12-06 NOTE — TELEPHONE ENCOUNTER
Requested Prescriptions     Pending Prescriptions Disp Refills    metoprolol succinate (TOPROL XL) 50 MG extended release tablet [Pharmacy Med Name: METOPROLOL ER SUCCINATE 50MG TABS] 30 tablet 1     Sig: TAKE 1 TABLET BY MOUTH DAILY

## 2023-12-08 RX ORDER — METOPROLOL SUCCINATE 50 MG/1
50 TABLET, EXTENDED RELEASE ORAL DAILY
Qty: 30 TABLET | Refills: 1 | Status: SHIPPED | OUTPATIENT
Start: 2023-12-08

## 2024-01-05 DIAGNOSIS — I10 ESSENTIAL HYPERTENSION: Primary | ICD-10-CM

## 2024-01-05 DIAGNOSIS — E03.9 HYPOTHYROIDISM, UNSPECIFIED TYPE: ICD-10-CM

## 2024-01-05 DIAGNOSIS — C90.00 MULTIPLE MYELOMA, REMISSION STATUS UNSPECIFIED (HCC): ICD-10-CM

## 2024-01-05 NOTE — TELEPHONE ENCOUNTER
Requested Prescriptions     Pending Prescriptions Disp Refills    furosemide (LASIX) 40 MG tablet 60 tablet      Sig: Take 1 tablet by mouth daily

## 2024-01-08 DIAGNOSIS — C90.00 MULTIPLE MYELOMA, REMISSION STATUS UNSPECIFIED (HCC): ICD-10-CM

## 2024-01-08 DIAGNOSIS — I10 ESSENTIAL HYPERTENSION: ICD-10-CM

## 2024-01-08 RX ORDER — FUROSEMIDE 40 MG/1
40 TABLET ORAL DAILY
Qty: 90 TABLET | Refills: 0 | Status: SHIPPED | OUTPATIENT
Start: 2024-01-08

## 2024-01-08 NOTE — TELEPHONE ENCOUNTER
Patient needs to go have labs done that were ordered in October. I have resent the [facility] orders. I have sent a 90 day supply of the Lasix but we really need to keep an eye on electrolytes and kidney function.

## 2024-01-10 NOTE — TELEPHONE ENCOUNTER
Requested Prescriptions     Pending Prescriptions Disp Refills    levothyroxine (SYNTHROID) 100 MCG tablet [Pharmacy Med Name: LEVOTHYROXINE 0.100MG (100MCG) TAB] 90 tablet 0     Sig: TAKE 1 TABLET BY MOUTH DAILY BEFORE BREAKFAST FOR HYPOTHYROIDISM

## 2024-01-11 RX ORDER — LEVOTHYROXINE SODIUM 0.1 MG/1
TABLET ORAL
Qty: 90 TABLET | Refills: 0 | Status: SHIPPED | OUTPATIENT
Start: 2024-01-11

## 2024-02-09 DIAGNOSIS — F41.1 GENERALIZED ANXIETY DISORDER: ICD-10-CM

## 2024-02-09 DIAGNOSIS — E11.22 TYPE 2 DIABETES MELLITUS WITH STAGE 3A CHRONIC KIDNEY DISEASE, WITHOUT LONG-TERM CURRENT USE OF INSULIN (HCC): Primary | ICD-10-CM

## 2024-02-09 DIAGNOSIS — N18.31 TYPE 2 DIABETES MELLITUS WITH STAGE 3A CHRONIC KIDNEY DISEASE, WITHOUT LONG-TERM CURRENT USE OF INSULIN (HCC): Primary | ICD-10-CM

## 2024-02-12 RX ORDER — VENLAFAXINE HYDROCHLORIDE 150 MG/1
150 CAPSULE, EXTENDED RELEASE ORAL DAILY
Qty: 90 CAPSULE | Refills: 1 | Status: SHIPPED | OUTPATIENT
Start: 2024-02-12

## 2024-02-21 ENCOUNTER — OFFICE VISIT (OUTPATIENT)
Facility: CLINIC | Age: 74
End: 2024-02-21
Payer: MEDICARE

## 2024-02-21 VITALS
RESPIRATION RATE: 16 BRPM | TEMPERATURE: 97.3 F | WEIGHT: 201 LBS | BODY MASS INDEX: 39.46 KG/M2 | HEIGHT: 60 IN | SYSTOLIC BLOOD PRESSURE: 128 MMHG | DIASTOLIC BLOOD PRESSURE: 64 MMHG | HEART RATE: 74 BPM

## 2024-02-21 DIAGNOSIS — R05.1 ACUTE COUGH: Primary | ICD-10-CM

## 2024-02-21 DIAGNOSIS — C90.00 MULTIPLE MYELOMA, REMISSION STATUS UNSPECIFIED (HCC): ICD-10-CM

## 2024-02-21 DIAGNOSIS — J44.1 COPD EXACERBATION (HCC): ICD-10-CM

## 2024-02-21 DIAGNOSIS — R07.81 RIB PAIN: ICD-10-CM

## 2024-02-21 DIAGNOSIS — E66.01 SEVERE OBESITY (BMI 35.0-39.9) WITH COMORBIDITY (HCC): ICD-10-CM

## 2024-02-21 DIAGNOSIS — Z85.3 HISTORY OF BREAST CANCER: ICD-10-CM

## 2024-02-21 PROCEDURE — 3074F SYST BP LT 130 MM HG: CPT | Performed by: STUDENT IN AN ORGANIZED HEALTH CARE EDUCATION/TRAINING PROGRAM

## 2024-02-21 PROCEDURE — G8400 PT W/DXA NO RESULTS DOC: HCPCS | Performed by: STUDENT IN AN ORGANIZED HEALTH CARE EDUCATION/TRAINING PROGRAM

## 2024-02-21 PROCEDURE — G8484 FLU IMMUNIZE NO ADMIN: HCPCS | Performed by: STUDENT IN AN ORGANIZED HEALTH CARE EDUCATION/TRAINING PROGRAM

## 2024-02-21 PROCEDURE — 3017F COLORECTAL CA SCREEN DOC REV: CPT | Performed by: STUDENT IN AN ORGANIZED HEALTH CARE EDUCATION/TRAINING PROGRAM

## 2024-02-21 PROCEDURE — 3078F DIAST BP <80 MM HG: CPT | Performed by: STUDENT IN AN ORGANIZED HEALTH CARE EDUCATION/TRAINING PROGRAM

## 2024-02-21 PROCEDURE — G8417 CALC BMI ABV UP PARAM F/U: HCPCS | Performed by: STUDENT IN AN ORGANIZED HEALTH CARE EDUCATION/TRAINING PROGRAM

## 2024-02-21 PROCEDURE — 4004F PT TOBACCO SCREEN RCVD TLK: CPT | Performed by: STUDENT IN AN ORGANIZED HEALTH CARE EDUCATION/TRAINING PROGRAM

## 2024-02-21 PROCEDURE — 3023F SPIROM DOC REV: CPT | Performed by: STUDENT IN AN ORGANIZED HEALTH CARE EDUCATION/TRAINING PROGRAM

## 2024-02-21 PROCEDURE — 1090F PRES/ABSN URINE INCON ASSESS: CPT | Performed by: STUDENT IN AN ORGANIZED HEALTH CARE EDUCATION/TRAINING PROGRAM

## 2024-02-21 PROCEDURE — 1123F ACP DISCUSS/DSCN MKR DOCD: CPT | Performed by: STUDENT IN AN ORGANIZED HEALTH CARE EDUCATION/TRAINING PROGRAM

## 2024-02-21 PROCEDURE — G8427 DOCREV CUR MEDS BY ELIG CLIN: HCPCS | Performed by: STUDENT IN AN ORGANIZED HEALTH CARE EDUCATION/TRAINING PROGRAM

## 2024-02-21 PROCEDURE — 99214 OFFICE O/P EST MOD 30 MIN: CPT | Performed by: STUDENT IN AN ORGANIZED HEALTH CARE EDUCATION/TRAINING PROGRAM

## 2024-02-21 RX ORDER — ALBUTEROL SULFATE 90 UG/1
2 AEROSOL, METERED RESPIRATORY (INHALATION) EVERY 6 HOURS PRN
COMMUNITY

## 2024-02-21 RX ORDER — GABAPENTIN 300 MG/1
300 CAPSULE ORAL 3 TIMES DAILY
COMMUNITY
Start: 2024-01-22

## 2024-02-21 RX ORDER — CALCIUM CARBONATE 500(1250)
500 TABLET ORAL DAILY
COMMUNITY

## 2024-02-21 RX ORDER — AZITHROMYCIN 250 MG/1
TABLET, FILM COATED ORAL
Qty: 6 TABLET | Refills: 0 | Status: SHIPPED | OUTPATIENT
Start: 2024-02-21 | End: 2024-03-02

## 2024-02-21 RX ORDER — GUAIFENESIN 600 MG/1
600 TABLET, EXTENDED RELEASE ORAL 2 TIMES DAILY
Qty: 30 TABLET | Refills: 0 | Status: SHIPPED | OUTPATIENT
Start: 2024-02-21 | End: 2024-03-07

## 2024-02-21 RX ORDER — BENZONATATE 100 MG/1
100 CAPSULE ORAL 3 TIMES DAILY PRN
Qty: 30 CAPSULE | Refills: 0 | Status: SHIPPED | OUTPATIENT
Start: 2024-02-21 | End: 2024-03-02

## 2024-02-21 RX ORDER — PREDNISONE 20 MG/1
20 TABLET ORAL 2 TIMES DAILY
Qty: 10 TABLET | Refills: 0 | Status: SHIPPED | OUTPATIENT
Start: 2024-02-21 | End: 2024-02-26

## 2024-02-21 ASSESSMENT — PATIENT HEALTH QUESTIONNAIRE - PHQ9
SUM OF ALL RESPONSES TO PHQ QUESTIONS 1-9: 2
5. POOR APPETITE OR OVEREATING: 0
SUM OF ALL RESPONSES TO PHQ QUESTIONS 1-9: 2
1. LITTLE INTEREST OR PLEASURE IN DOING THINGS: 0
8. MOVING OR SPEAKING SO SLOWLY THAT OTHER PEOPLE COULD HAVE NOTICED. OR THE OPPOSITE, BEING SO FIGETY OR RESTLESS THAT YOU HAVE BEEN MOVING AROUND A LOT MORE THAN USUAL: 0
3. TROUBLE FALLING OR STAYING ASLEEP: 2
SUM OF ALL RESPONSES TO PHQ9 QUESTIONS 1 & 2: 0
6. FEELING BAD ABOUT YOURSELF - OR THAT YOU ARE A FAILURE OR HAVE LET YOURSELF OR YOUR FAMILY DOWN: 0
10. IF YOU CHECKED OFF ANY PROBLEMS, HOW DIFFICULT HAVE THESE PROBLEMS MADE IT FOR YOU TO DO YOUR WORK, TAKE CARE OF THINGS AT HOME, OR GET ALONG WITH OTHER PEOPLE: 0
4. FEELING TIRED OR HAVING LITTLE ENERGY: 0
7. TROUBLE CONCENTRATING ON THINGS, SUCH AS READING THE NEWSPAPER OR WATCHING TELEVISION: 0
9. THOUGHTS THAT YOU WOULD BE BETTER OFF DEAD, OR OF HURTING YOURSELF: 0
SUM OF ALL RESPONSES TO PHQ QUESTIONS 1-9: 2
SUM OF ALL RESPONSES TO PHQ QUESTIONS 1-9: 2
2. FEELING DOWN, DEPRESSED OR HOPELESS: 0

## 2024-02-21 NOTE — PROGRESS NOTES
Chief Complaint   Patient presents with    Chest Congestion     Since 2/13/2024     \"Have you been to the ER, urgent care clinic since your last visit?  Hospitalized since your last visit?\"    NO    “Have you seen or consulted any other health care providers outside of [facility] since your last visit?”    YES - When: approximately 2  weeks ago.  Where and Why: Oncology .  /64 (Site: Left Upper Arm, Position: Sitting, Cuff Size: Large Adult)   Pulse 74   Temp 97.3 °F (36.3 °C) (Temporal)   Resp 16   Ht 1.524 m (5')   Wt 91.2 kg (201 lb)   BMI 39.26 kg/m²

## 2024-02-21 NOTE — PROGRESS NOTES
[facility]  Subjective       Chief Complaint   Patient presents with    Chest Congestion     Since 2/13/2024     HPI:  Mara Salinas is a 73 y.o. female.    New to provider  Patient was an LPN and worked an OB/GYN    Significant PMH of Multiple Myeloma and Breast Cancer as well as COPD    Says she has been having \"lung congestion\"  Says she has had been coughing up some mucus     Says she had some \"temperatures\" 102F (last Wednesday) and then down to 100.1F and then down to 99.2F  No fevers last couple of days    Does have COPD.  Reports that last week her lungs sounded like a \"symphony\" and she was wheezing but this has really improved.    Uses Trelegy Inhaler, takes this daily  Uses her husbands albuterol- has used it a handful of times over last week.     She has not tried any otc cough medications      Past Medical History:   Diagnosis Date    Arthritis     Asthma     Cancer (HCC)     BREAST CANCER RIGHT BREAST LUMPECTOMY     Chronic pain     COPD (chronic obstructive pulmonary disease) ([facility])     Depressive disorder 9/22/2020    Diabetes ([facility])     Essential hypertension 9/22/2020    GERD (gastroesophageal reflux disease)     Hypothyroidism 9/22/2020    Malignant tumor of breast ([facility]) 9/22/2020    Osteoarthritis 9/22/2020    Pure hypercholesterolemia 9/22/2020    Sleep apnea     Thyroid disease 1980    REMOVED     Type 2 diabetes mellitus without complication ([facility]) 9/22/2020     Current Outpatient Medications   Medication Sig Dispense Refill    gabapentin (NEURONTIN) 300 MG capsule Take 1 capsule by mouth 3 times daily.      calcium carbonate (OSCAL) 500 MG TABS tablet Take 1 tablet by mouth daily      albuterol sulfate HFA (VENTOLIN HFA) 108 (90 Base) MCG/ACT inhaler Inhale 2 puffs into the lungs every 6 hours as needed for Wheezing      guaiFENesin (MUCINEX) 600 MG extended release tablet Take 1 tablet by mouth 2 times daily for 15 days 30 tablet 0    predniSONE (DELTASONE) 20 MG

## 2024-03-10 DIAGNOSIS — E78.5 HYPERLIPIDEMIA, UNSPECIFIED HYPERLIPIDEMIA TYPE: Primary | ICD-10-CM

## 2024-03-11 NOTE — TELEPHONE ENCOUNTER
Requested Prescriptions     Pending Prescriptions Disp Refills    rosuvastatin (CRESTOR) 40 MG tablet [Pharmacy Med Name: ROSUVASTATIN 40MG TABLETS] 90 tablet 1     Sig: TAKE 1 TABLET BY MOUTH EVERY NIGHT FOR HIGH CHOLESTEROL

## 2024-03-12 DIAGNOSIS — E78.5 HYPERLIPIDEMIA, UNSPECIFIED HYPERLIPIDEMIA TYPE: ICD-10-CM

## 2024-03-12 RX ORDER — ROSUVASTATIN CALCIUM 40 MG/1
TABLET, COATED ORAL
Qty: 90 TABLET | Refills: 0 | Status: SHIPPED | OUTPATIENT
Start: 2024-03-12

## 2024-03-13 NOTE — TELEPHONE ENCOUNTER
I don't see that patient has a recent cholesterol panel. Have any of her specialists ordered this blood work?    I see that I have active lab orders since January. Please ask that patient go get the blood work done. I have sent a 3 month supply of the requested medicaiton in the meantime.

## 2024-04-05 ENCOUNTER — TELEPHONE (OUTPATIENT)
Facility: CLINIC | Age: 74
End: 2024-04-05

## 2024-04-11 RX ORDER — LEVOTHYROXINE SODIUM 0.1 MG/1
TABLET ORAL
Qty: 30 TABLET | Refills: 0 | Status: SHIPPED | OUTPATIENT
Start: 2024-04-11

## 2024-04-11 NOTE — TELEPHONE ENCOUNTER
Patient needs to have her labs done. I have sent a 30 day supply. Please advise her to go to [facility] as soon as possible.

## 2024-04-16 NOTE — ED PROVIDER NOTES
[facility] EMERGENCY DEPT   EMERGENCY DEPARTMENT HISTORY AND PHYSICAL EXAM      Date: 3/29/2023  Patient Name: Yi Miller      History of Presenting Illness     Chief Complaint   Patient presents with    Shortness of Breath    Peripheral Edema       History Provided By: Patient    Means of Arrival: by private vehicle. Location/Duration/Severity/Modifying factors   75-year-old female who presents to the emergency room with a chief complaint of shortness of breath. History of multiple myeloma, getting chemotherapy. She also notes bilateral pitting edema both lower extremities over the past few days. Was referred in because of her progressive shortness of breath. She states for the past 3 days its been progressively worse. Worse with both laying flat and exertion. She denies any chest pain or any change from her baseline cough due to COPD. There are no other complaints, changes, or physical findings at this time. PCP: Adolfo Arellano, DO    Current Facility-Administered Medications   Medication Dose Route Frequency Provider Last Rate Last Admin    sodium chloride 0.9 % bolus infusion 500 mL  500 mL IntraVENous ONCE Thomasene Counter, DO         Current Outpatient Medications   Medication Sig Dispense Refill    rosuvastatin (CRESTOR) 40 mg tablet Take 1 Tablet by mouth nightly. Indications: high cholesterol and high triglycerides 90 Tablet 1    hydrOXYzine pamoate (VISTARIL) 50 mg capsule TAKE 1 TO 2 CAPSULES BY MOUTH EVERY NIGHT 30 MINUTES BEFORE BEDTIME FOR INSOMNIA      bortezomib (VELCADE INJECTION) by Injection route. Dexamethasone 1.5 mg (21 tabs) DsPk Take  by mouth.      lenalidomide (Revlimid) 10 mg cap Take  by mouth. SITagliptin phosphate (JANUVIA) 100 mg tablet Take 1 Tablet by mouth daily. Stop janumet-we are stopping metformin altogether 90 Tablet 3    glipiZIDE (GLUCOTROL) 5 mg tablet Take 1 tab by mouth 30 min prior to breakfast daily for diabetes.  90 Tablet 1    levothyroxine (SYNTHROID) 100 mcg tablet Take 1 Tablet by mouth Daily (before breakfast). Indications: a condition with low thyroid hormone levels 90 Tablet 1    empagliflozin (Jardiance) 10 mg tablet Take 1 Tablet by mouth daily. 90 Tablet 3    venlafaxine-SR (EFFEXOR-XR) 150 mg capsule Take 1 Capsule by mouth daily. 90 Capsule 1    amLODIPine (NORVASC) 10 mg tablet Take 1 Tablet by mouth daily. 90 Tablet 1    furosemide (LASIX) 20 mg tablet Take 1 Tablet by mouth daily. 90 Tablet 1    pantoprazole (PROTONIX) 40 mg tablet Take 1 Tablet by mouth daily as needed (GERD). Indications: gastroesophageal reflux disease 90 Tablet 1    Trelegy Ellipta 100-62.5-25 mcg inhaler INHALE 1 PUFF BY MOUTH EVERY DAY      anastrozole (ARIMIDEX) 1 mg tablet Take 1 mg by mouth daily. aspirin delayed-release 81 mg tablet Take 81 mg by mouth daily. omega-3 fatty acids/dha/epa (MEGARED PLANT-OMEGA-3 PO) Take 800 mg by mouth daily. vitamin E (AQUA GEMS) 400 unit capsule Take 400 Units by mouth daily. cholecalciferol (VITAMIN D3) (5000 Units/125 mcg) tab tablet Take 5,000 Units by mouth daily.          Past History     Past Medical History:  Past Medical History:   Diagnosis Date    Arthritis     Asthma     Cancer ([address]     BREAST CANCER RIGHT BREAST LUMPECTOMY     Chronic pain     COPD (chronic obstructive pulmonary disease) ([address]     Depressive disorder 9/22/2020    Diabetes ([address]     Essential hypertension 9/22/2020    GERD (gastroesophageal reflux disease)     Hypothyroidism 9/22/2020    Malignant tumor of breast ([address] 9/22/2020    Osteoarthritis 9/22/2020    Pure hypercholesterolemia 9/22/2020    Sleep apnea     Thyroid disease 1980    REMOVED     Type 2 diabetes mellitus without complication ([address] 4/78/3040       Past Surgical History:  Past Surgical History:   Procedure Laterality Date    HX CHOLECYSTECTOMY  2013    HX COLONOSCOPY  10/20/2017    HX COLONOSCOPY  2014    HX COLONOSCOPY  2007    HX HEENT      HX HYMENECTOMY      HX ORTHOPAEDIC Left 07/07/2020    left rotator cuff    UT MASTECTOMY PARTIAL Right 2016    UT UNLISTED PROCEDURE BREAST Right 02/2016    LUMPECTOMY        Family History:  Family History   Problem Relation Age of Onset    Thyroid Disease Mother     Heart Disease Mother     Hypertension Mother     Heart Attack Mother     Cancer Father         SPINE     Other Father         COMMITTED SUICIDE     Diabetes Sister     Ovarian Cancer Sister     Diabetes Brother     Heart Disease Brother     Diabetes Brother     Heart Disease Brother     Lung Disease Brother     Diabetes Brother     Lung Cancer Brother     Anesth Problems Neg Hx        Social History:  Social History     Tobacco Use    Smoking status: Every Day     Packs/day: 1.00     Years: 50.00     Pack years: 50.00     Types: Cigarettes    Smokeless tobacco: Never   Vaping Use    Vaping Use: Never used   Substance Use Topics    Alcohol use: Not Currently    Drug use: Never       Allergies: Allergies   Allergen Reactions    Erythromycin Base Unknown (comments)    Other Medication Other (comments)     TUBERCULOSIS  PT GETS CELLULITIS            Parafon Forte Dsc [Chlorzoxazone] Unknown (comments)       Medications:  Previous Medications    AMLODIPINE (NORVASC) 10 MG TABLET    Take 1 Tablet by mouth daily. ANASTROZOLE (ARIMIDEX) 1 MG TABLET    Take 1 mg by mouth daily. ASPIRIN DELAYED-RELEASE 81 MG TABLET    Take 81 mg by mouth daily. BORTEZOMIB (VELCADE INJECTION)    by Injection route. CHOLECALCIFEROL (VITAMIN D3) (5000 UNITS/125 MCG) TAB TABLET    Take 5,000 Units by mouth daily. DEXAMETHASONE 1.5 MG (21 TABS) DSPK    Take  by mouth. EMPAGLIFLOZIN (JARDIANCE) 10 MG TABLET    Take 1 Tablet by mouth daily. FUROSEMIDE (LASIX) 20 MG TABLET    Take 1 Tablet by mouth daily. GLIPIZIDE (GLUCOTROL) 5 MG TABLET    Take 1 tab by mouth 30 min prior to breakfast daily for diabetes.     HYDROXYZINE PAMOATE (VISTARIL) 50 MG CAPSULE    TAKE 1 TO 2 CAPSULES BY MOUTH EVERY NIGHT 30 MINUTES BEFORE BEDTIME FOR INSOMNIA    LENALIDOMIDE (REVLIMID) 10 MG CAP    Take  by mouth. LEVOTHYROXINE (SYNTHROID) 100 MCG TABLET    Take 1 Tablet by mouth Daily (before breakfast). Indications: a condition with low thyroid hormone levels    OMEGA-3 FATTY ACIDS/DHA/EPA (MEGARED PLANT-OMEGA-3 PO)    Take 800 mg by mouth daily. PANTOPRAZOLE (PROTONIX) 40 MG TABLET    Take 1 Tablet by mouth daily as needed (GERD). Indications: gastroesophageal reflux disease    ROSUVASTATIN (CRESTOR) 40 MG TABLET    Take 1 Tablet by mouth nightly. Indications: high cholesterol and high triglycerides    SITAGLIPTIN PHOSPHATE (JANUVIA) 100 MG TABLET    Take 1 Tablet by mouth daily. Stop janumet-we are stopping metformin altogether    TRELEGY ELLIPTA 100-62.5-25 MCG INHALER    INHALE 1 PUFF BY MOUTH EVERY DAY    VENLAFAXINE-SR (EFFEXOR-XR) 150 MG CAPSULE    Take 1 Capsule by mouth daily. VITAMIN E (AQUA GEMS) 400 UNIT CAPSULE    Take 400 Units by mouth daily. Social Determinants of Health:  Social Determinants of Health     Tobacco Use: High Risk    Smoking Tobacco Use: Every Day    Smokeless Tobacco Use: Never    Passive Exposure: Not on file   Alcohol Use: Not At Risk    Frequency of Alcohol Consumption: Monthly or less    Average Number of Drinks: 3 or 4    Frequency of Binge Drinking: Never   Financial Resource Strain: Low Risk     Difficulty of Paying Living Expenses: Not very hard   Food Insecurity: No Food Insecurity    Worried About Running Out of Food in the Last Year: Never true    Ran Out of Food in the Last Year: Never true   Transportation Needs: No Transportation Needs    Lack of Transportation (Medical): No    Lack of Transportation (Non-Medical): No   Physical Activity: Inactive    Days of Exercise per Week: 0 days    Minutes of Exercise per Session: 0 min   Stress: No Stress Concern Present    Feeling of Stress :  Only a little   Social Connections: Not on file   Intimate Partner No qualified resident/fellow available to assist Violence: Not At Risk    Fear of Current or Ex-Partner: No    Emotionally Abused: No    Physically Abused: No    Sexually Abused: No   Depression: Not at risk    PHQ-2 Score: 0   Housing Stability: Low Risk     Unable to Pay for Housing in the Last Year: No    Number of Places Lived in the Last Year: 1    Unstable Housing in the Last Year: No     Good access to primary and/or specialist care. Reports generally stable financial, home and living environment. Physical Exam     ED Triage Vitals [03/29/23 1732]   ED Encounter Vitals Group      BP (!) 162/74      Pulse (Heart Rate) 95      Resp Rate 28      Temp 98 °F (36.7 °C)      Temp src       O2 Sat (%) 94 %      Weight 229 lb      Height 5' 4\"      Physical Exam  Vitals and nursing note reviewed. Constitutional:       General: She is not in acute distress. Appearance: Normal appearance. HENT:      Head: Normocephalic and atraumatic. Right Ear: External ear normal.      Left Ear: External ear normal.      Nose: Nose normal.      Mouth/Throat:      Mouth: Mucous membranes are moist.   Eyes:      Conjunctiva/sclera: Conjunctivae normal.   Cardiovascular:      Rate and Rhythm: Normal rate and regular rhythm. Pulses: Normal pulses. Heart sounds: Normal heart sounds. No murmur heard. Pulmonary:      Effort: Pulmonary effort is normal.      Breath sounds: Examination of the right-middle field reveals wheezing. Examination of the left-middle field reveals wheezing. Examination of the right-lower field reveals wheezing and rales. Examination of the left-lower field reveals wheezing and rales. Wheezing and rales present. No rhonchi. Abdominal:      General: Abdomen is flat. Tenderness: There is no abdominal tenderness. There is no guarding or rebound. Musculoskeletal:         General: No swelling or tenderness. Normal range of motion. Cervical back: Normal range of motion and neck supple. Right lower leg: Edema present.       Left lower leg: Edema present. Comments: 2+ edema both lower extremities without erythema   Skin:     General: Skin is warm and dry. Capillary Refill: Capillary refill takes less than 2 seconds. Findings: No rash. Neurological:      General: No focal deficit present. Mental Status: She is alert. Lab and Diagnostic Study Results     Labs -  Recent Results (from the past 24 hour(s))   CBC WITH AUTOMATED DIFF    Collection Time: 03/29/23  5:56 PM   Result Value Ref Range    WBC 10.9 3.6 - 11.0 K/uL    RBC 4.06 3.80 - 5.20 M/uL    HGB 11.4 (L) 11.5 - 16.0 g/dL    HCT 35.2 35.0 - 47.0 %    MCV 86.7 80.0 - 99.0 FL    MCH 28.1 26.0 - 34.0 PG    MCHC 32.4 30.0 - 36.5 g/dL    RDW 15.7 (H) 11.5 - 14.5 %    PLATELET 484 (H) 985 - 400 K/uL    MPV 10.7 8.9 - 12.9 FL    NRBC 0.0 0.0  WBC    ABSOLUTE NRBC 0.00 0.00 - 0.01 K/uL    NEUTROPHILS 75 32 - 75 %    LYMPHOCYTES 13 12 - 49 %    MONOCYTES 9 5 - 13 %    EOSINOPHILS 2 0 - 7 %    BASOPHILS 1 0 - 1 %    IMMATURE GRANULOCYTES 0 0 - 0.5 %    ABS. NEUTROPHILS 8.1 (H) 1.8 - 8.0 K/UL    ABS. LYMPHOCYTES 1.4 0.8 - 3.5 K/UL    ABS. MONOCYTES 1.0 0.0 - 1.0 K/UL    ABS. EOSINOPHILS 0.3 0.0 - 0.4 K/UL    ABS. BASOPHILS 0.1 0.0 - 0.1 K/UL    ABS. IMM. GRANS. 0.0 0.00 - 0.04 K/UL    DF AUTOMATED     METABOLIC PANEL, COMPREHENSIVE    Collection Time: 03/29/23  5:56 PM   Result Value Ref Range    Sodium 139 136 - 145 mmol/L    Potassium 4.0 3.5 - 5.1 mmol/L    Chloride 110 (H) 97 - 108 mmol/L    CO2 22 21 - 32 mmol/L    Anion gap 7 5 - 15 mmol/L    Glucose 96 65 - 100 mg/dL    BUN 18 6 - 20 mg/dL    Creatinine 1.61 (H) 0.55 - 1.02 mg/dL    BUN/Creatinine ratio 11 (L) 12 - 20      eGFR 33 (L) >60 ml/min/1.73m2    Calcium 8.2 (L) 8.5 - 10.1 mg/dL    Bilirubin, total 0.3 0.2 - 1.0 mg/dL    AST (SGOT) 12 (L) 15 - 37 U/L    ALT (SGPT) 15 12 - 78 U/L    Alk.  phosphatase 118 (H) 45 - 117 U/L    Protein, total 6.9 6.4 - 8.2 g/dL    Albumin 2.9 (L) 3.5 - 5.0 g/dL Globulin 4.0 2.0 - 4.0 g/dL    A-G Ratio 0.7 (L) 1.1 - 2.2     LACTIC ACID    Collection Time: 03/29/23  5:56 PM   Result Value Ref Range    Lactic acid 1.4 0.4 - 2.0 mmol/L   NT-PRO BNP    Collection Time: 03/29/23  5:56 PM   Result Value Ref Range    NT pro-BNP 2,359 (H) <125 pg/mL   TROPONIN-HIGH SENSITIVITY    Collection Time: 03/29/23  5:56 PM   Result Value Ref Range    Troponin-High Sensitivity 8 0 - 51 ng/L         Radiologic Studies -   XR CHEST SNGL V   Final Result   Small left pleural effusion with bilateral pulmonary infiltrates. Non-plain film images such as CT, Ultrasound and MRI are read by the radiologist. Plain radiographic images are visualized and preliminarily interpreted by the ED Provider with the below findings:    Please see the full medical record for comprehensive list of labs and imaging obtained during the visit. All labs and imaging studies were personally reviewed. Cardiac Monitor:  Cardiac Monitor Interpretation:     Rate: 85-90 BPM,   Rhythm: Sinus Rhythm    Pulse Oximetry Interpretation: 94% on Nasal Cannula      Medical Decision Making and ED Course   - I am the first and primary provider for this patient AND AM THE PRIMARY PROVIDER OF RECORD. - I reviewed the vital signs, available nursing notes, past medical history, past surgical history, family history and social history. - Initial assessment performed. The patients presenting problems have been discussed, and the staff are in agreement with the care plan formulated and outlined with them. I have encouraged them to ask questions as they arise throughout their visit. Vital Signs-Reviewed the patient's vital signs.     Patient was given the following medications:  Medications   sodium chloride 0.9 % bolus infusion 500 mL (has no administration in time range)       Patient Vitals for the past 24 hrs:   Temp Pulse Resp BP SpO2   03/29/23 1816 -- -- -- -- 95 %   03/29/23 1732 98 °F (36.7 °C) 95 28 (!) 162/74 94 %       Social Determinants affecting Dx or Tx: None    Records Reviewed: None    Additional Considerations:    None    Provider Notes (Medical Decision Making):     MDM  Number of Diagnoses or Management Options  Acute pulmonary edema (HCC)  SOB (shortness of breath)  Diagnosis management comments: Is a 49-year-old female presenting with shortness of breath. Patient's results demonstrate left pleural effusion and pulmonary infiltrates suspect likely due to CHF given her extremity edema and elevated BNP. She says she had a cardiac work-up earlier in the year that was reportedly negative. DDx: Bronchitis, bronchospasm, COPD, asthma, acute coronary syndrome, CHF, pleural effusion, pneumothorax, pneumonia, COVID-19, viral illness, etc.             ED Course:         ED Course as of 03/29/23 2043   Wed Mar 29, 2023   2017 Lab work-up shows elevated BNP. Negative troponin. HAYLEY with creatinine 1.61. Chest x-ray shows left lateral effusion and pulmonary edema/infiltrates. [CALVIN]   2018 Suspect likely due to CHF. PE considered also however given GFR of 33, went to hold off on contrast administration, may need VQ scan although clinically suspect more likely pulmonary edema/CHF. [CALVIN]      ED Course User Index  [CALVIN] Tram Castro DO     This appears to be an acute condition.  ------------------------------------------------------------------------------------------------------------        Consultations:       Consultations: -  Hospitalist Consultant: Dr. Guille Wyman: We have asked for emergent assistance with regard to this patient. We have discussed the patients HPI, ROS, PE and results this far. They will come and evaluate the patient for admission. See the ED course section, if applicable for details on the content of consultations requested.     Procedures and Critical Care       Performed by: Bree Rojo DO    Procedures             CRITICAL CARE NOTE :  7:31 PM  CRITICAL CARE TIME: I have spent 31 minutes of critical care time involved in lab review, consultations with specialist, family decision-making, and documentation. During this entire length of time I was immediately available to the patient. Critical Care: The reason for providing this level of medical care for this critically ill patient was due a critical illness that impaired one or more vital organ systems such that there was a high probability of imminent or life threatening deterioration in the patients condition. This care involved high complexity decision making to assess, manipulate, and support vital system functions, to treat this vital organ system failure and to prevent further life threatening deterioration of the patients condition. Aggregate critical care time is exclusive of any separately billable procedures and teaching time. Jerri Sia, DO      Jerri Sia, DO        Disposition     DISPOSITION: Admit to Floor    ADMISSION: Patient is stable for Admission to the Hospital at this time. Discussed with admitting provider at time of hand off. Diagnosis: No diagnosis found. Follow-up Information    None         Patient's Medications   Start Taking    No medications on file   Continue Taking    AMLODIPINE (NORVASC) 10 MG TABLET    Take 1 Tablet by mouth daily. ANASTROZOLE (ARIMIDEX) 1 MG TABLET    Take 1 mg by mouth daily. ASPIRIN DELAYED-RELEASE 81 MG TABLET    Take 81 mg by mouth daily. BORTEZOMIB (VELCADE INJECTION)    by Injection route. CHOLECALCIFEROL (VITAMIN D3) (5000 UNITS/125 MCG) TAB TABLET    Take 5,000 Units by mouth daily. DEXAMETHASONE 1.5 MG (21 TABS) DSPK    Take  by mouth. EMPAGLIFLOZIN (JARDIANCE) 10 MG TABLET    Take 1 Tablet by mouth daily. FUROSEMIDE (LASIX) 20 MG TABLET    Take 1 Tablet by mouth daily. GLIPIZIDE (GLUCOTROL) 5 MG TABLET    Take 1 tab by mouth 30 min prior to breakfast daily for diabetes.     HYDROXYZINE PAMOATE (VISTARIL) 50 MG CAPSULE    TAKE 1 TO 2 CAPSULES BY MOUTH EVERY NIGHT 30 MINUTES BEFORE BEDTIME FOR INSOMNIA    LENALIDOMIDE (REVLIMID) 10 MG CAP    Take  by mouth. LEVOTHYROXINE (SYNTHROID) 100 MCG TABLET    Take 1 Tablet by mouth Daily (before breakfast). Indications: a condition with low thyroid hormone levels    OMEGA-3 FATTY ACIDS/DHA/EPA (MEGARED PLANT-OMEGA-3 PO)    Take 800 mg by mouth daily. PANTOPRAZOLE (PROTONIX) 40 MG TABLET    Take 1 Tablet by mouth daily as needed (GERD). Indications: gastroesophageal reflux disease    ROSUVASTATIN (CRESTOR) 40 MG TABLET    Take 1 Tablet by mouth nightly. Indications: high cholesterol and high triglycerides    SITAGLIPTIN PHOSPHATE (JANUVIA) 100 MG TABLET    Take 1 Tablet by mouth daily. Stop janumet-we are stopping metformin altogether    TRELEGY ELLIPTA 100-62.5-25 MCG INHALER    INHALE 1 PUFF BY MOUTH EVERY DAY    VENLAFAXINE-SR (EFFEXOR-XR) 150 MG CAPSULE    Take 1 Capsule by mouth daily. VITAMIN E (AQUA GEMS) 400 UNIT CAPSULE    Take 400 Units by mouth daily. These Medications have changed    No medications on file   Stop Taking    No medications on file                 Diagnosis     Clinical Impression: No diagnosis found. Attestations:    Mesha Leon, DO    Please note that this dictation was completed with [employer], the computer voice recognition software. Quite often unanticipated grammatical, syntax, homophones, and other interpretive errors are inadvertently transcribed by the computer software. Please disregard these errors. Please excuse any errors that have escaped final proofreading. Thank you.

## 2024-04-19 ENCOUNTER — TELEPHONE (OUTPATIENT)
Facility: CLINIC | Age: 74
End: 2024-04-19

## 2024-04-19 NOTE — TELEPHONE ENCOUNTER
Lisa from [facility] is calling about paperwork to be signed by Dr. Tucker.  They are wanting to know if this will be signed.  Please call and advise at [phone].  The paperwork is in Dr. Tucker's inbox.

## 2024-05-08 RX ORDER — LEVOTHYROXINE SODIUM 0.1 MG/1
TABLET ORAL
Qty: 30 TABLET | Refills: 0 | Status: SHIPPED | OUTPATIENT
Start: 2024-05-08 | End: 2024-06-06

## 2024-06-03 NOTE — TELEPHONE ENCOUNTER
Requested Prescriptions     Pending Prescriptions Disp Refills    JARDIANCE 10 MG tablet [Pharmacy Med Name: JARDIANCE 10MG TABLETS] 90 tablet 1     Sig: TAKE 1 TABLET BY MOUTH DAILY    rosuvastatin (CRESTOR) 40 MG tablet [Pharmacy Med Name: ROSUVASTATIN 40MG TABLETS] 90 tablet 0     Sig: TAKE 1 TABLET BY MOUTH EVERY NIGHT FOR HIGH CHOLESTEROL    levothyroxine (SYNTHROID) 100 MCG tablet [Pharmacy Med Name: LEVOTHYROXINE 0.100MG (100MCG) TAB] 90 tablet      Sig: TAKE 1 TABLET BY MOUTH DAILY BEFORE BREAKFAST FOR HYPOTHYROIDISM

## 2024-06-06 RX ORDER — LEVOTHYROXINE SODIUM 0.1 MG/1
TABLET ORAL
Qty: 90 TABLET | Refills: 0 | Status: SHIPPED | OUTPATIENT
Start: 2024-06-06

## 2024-06-06 RX ORDER — ROSUVASTATIN CALCIUM 40 MG/1
TABLET, COATED ORAL
Qty: 90 TABLET | Refills: 0 | Status: SHIPPED | OUTPATIENT
Start: 2024-06-06

## 2024-06-06 RX ORDER — EMPAGLIFLOZIN 10 MG/1
10 TABLET, FILM COATED ORAL DAILY
Qty: 90 TABLET | Refills: 3 | Status: SHIPPED | OUTPATIENT
Start: 2024-06-06

## 2024-06-06 NOTE — TELEPHONE ENCOUNTER
Patient needs to get labs done. They were ordered in January. I sent a 3 month supply of medications in the meantime.

## 2024-06-10 NOTE — TELEPHONE ENCOUNTER
Requested Prescriptions     Pending Prescriptions Disp Refills    furosemide (LASIX) 40 MG tablet [Pharmacy Med Name: FUROSEMIDE 40MG TABLETS] 90 tablet 0     Sig: TAKE 1 TABLET BY MOUTH DAILY

## 2024-06-13 RX ORDER — FUROSEMIDE 40 MG/1
40 TABLET ORAL DAILY
Qty: 90 TABLET | Refills: 0 | Status: SHIPPED | OUTPATIENT
Start: 2024-06-13

## 2024-06-13 NOTE — TELEPHONE ENCOUNTER
Can we try to pull labs from [facility]? I imagine her oncologist has ordered labs recently. I would like to review this before I send in a bunch of Lasix.

## 2024-06-14 LAB
ALBUMIN SERPL-MCNC: 3.5 G/DL (ref 3.8–4.8)
ALBUMIN/GLOB SERPL: 1.8 {RATIO}
ALP SERPL-CCNC: 76 IU/L (ref 44–121)
ALT SERPL-CCNC: 6 IU/L (ref 0–32)
AST SERPL-CCNC: 13 IU/L (ref 0–40)
BILIRUB SERPL-MCNC: <0.2 MG/DL (ref 0–1.2)
BUN SERPL-MCNC: 9 MG/DL (ref 8–27)
BUN/CREAT SERPL: 7 (ref 12–28)
CALCIUM SERPL-MCNC: 9.9 MG/DL (ref 8.7–10.3)
CHLORIDE SERPL-SCNC: 104 MMOL/L (ref 96–106)
CO2 SERPL-SCNC: 28 MMOL/L (ref 20–29)
CREAT SERPL-MCNC: 1.27 MG/DL (ref 0.57–1)
EGFRCR SERPLBLD CKD-EPI 2021: 44 ML/MIN/1.73
ERYTHROCYTE [DISTWIDTH] IN BLOOD BY AUTOMATED COUNT: 13.1 % (ref 11.7–15.4)
GLOBULIN SER CALC-MCNC: 2 G/DL (ref 1.5–4.5)
GLUCOSE SERPL-MCNC: 89 MG/DL (ref 70–99)
HCT VFR BLD AUTO: 39.2 % (ref 34–46.6)
HGB BLD-MCNC: 12.8 G/DL (ref 11.1–15.9)
MCH RBC QN AUTO: 32.5 PG (ref 26.6–33)
MCHC RBC AUTO-ENTMCNC: 32.7 G/DL (ref 31.5–35.7)
MCV RBC AUTO: 100 FL (ref 79–97)
PLATELET # BLD AUTO: 442 X10E3/UL (ref 150–450)
POTASSIUM SERPL-SCNC: 5.5 MMOL/L (ref 3.5–5.2)
PROT SERPL-MCNC: 5.5 G/DL (ref 6–8.5)
RBC # BLD AUTO: 3.94 X10E6/UL (ref 3.77–5.28)
SODIUM SERPL-SCNC: 141 MMOL/L (ref 134–144)
T4 FREE SERPL-MCNC: 1.07 NG/DL (ref 0.82–1.77)
TSH SERPL DL<=0.005 MIU/L-ACNC: 16.4 UIU/ML (ref 0.45–4.5)
WBC # BLD AUTO: 8.9 X10E3/UL (ref 3.4–10.8)

## 2024-06-17 ENCOUNTER — OFFICE VISIT (OUTPATIENT)
Facility: CLINIC | Age: 74
End: 2024-06-17
Payer: MEDICARE

## 2024-06-17 VITALS
HEIGHT: 64 IN | WEIGHT: 209 LBS | TEMPERATURE: 95.4 F | DIASTOLIC BLOOD PRESSURE: 82 MMHG | SYSTOLIC BLOOD PRESSURE: 119 MMHG | RESPIRATION RATE: 18 BRPM | BODY MASS INDEX: 35.68 KG/M2 | HEART RATE: 74 BPM

## 2024-06-17 DIAGNOSIS — C90.00 MULTIPLE MYELOMA, REMISSION STATUS UNSPECIFIED (HCC): ICD-10-CM

## 2024-06-17 DIAGNOSIS — I50.22 CHRONIC SYSTOLIC (CONGESTIVE) HEART FAILURE (HCC): ICD-10-CM

## 2024-06-17 DIAGNOSIS — E03.9 HYPOTHYROIDISM, UNSPECIFIED TYPE: ICD-10-CM

## 2024-06-17 DIAGNOSIS — I10 ESSENTIAL HYPERTENSION: ICD-10-CM

## 2024-06-17 DIAGNOSIS — Z00.00 MEDICARE ANNUAL WELLNESS VISIT, SUBSEQUENT: Primary | ICD-10-CM

## 2024-06-17 DIAGNOSIS — I48.91 ATRIAL FIBRILLATION, UNSPECIFIED TYPE (HCC): ICD-10-CM

## 2024-06-17 DIAGNOSIS — E78.5 HYPERLIPIDEMIA, UNSPECIFIED HYPERLIPIDEMIA TYPE: ICD-10-CM

## 2024-06-17 DIAGNOSIS — E11.22 TYPE 2 DIABETES MELLITUS WITH STAGE 3A CHRONIC KIDNEY DISEASE, WITHOUT LONG-TERM CURRENT USE OF INSULIN (HCC): ICD-10-CM

## 2024-06-17 DIAGNOSIS — N18.31 TYPE 2 DIABETES MELLITUS WITH STAGE 3A CHRONIC KIDNEY DISEASE, WITHOUT LONG-TERM CURRENT USE OF INSULIN (HCC): ICD-10-CM

## 2024-06-17 DIAGNOSIS — J44.9 CHRONIC OBSTRUCTIVE PULMONARY DISEASE, UNSPECIFIED COPD TYPE (HCC): ICD-10-CM

## 2024-06-17 DIAGNOSIS — D61.9 ANEMIA DUE TO BONE MARROW FAILURE, UNSPECIFIED BONE MARROW FAILURE TYPE (HCC): ICD-10-CM

## 2024-06-17 LAB — HBA1C MFR BLD: 5.8 %

## 2024-06-17 PROCEDURE — G8427 DOCREV CUR MEDS BY ELIG CLIN: HCPCS | Performed by: FAMILY MEDICINE

## 2024-06-17 PROCEDURE — G0439 PPPS, SUBSEQ VISIT: HCPCS | Performed by: FAMILY MEDICINE

## 2024-06-17 PROCEDURE — 3074F SYST BP LT 130 MM HG: CPT | Performed by: FAMILY MEDICINE

## 2024-06-17 PROCEDURE — 99214 OFFICE O/P EST MOD 30 MIN: CPT | Performed by: FAMILY MEDICINE

## 2024-06-17 PROCEDURE — 83036 HEMOGLOBIN GLYCOSYLATED A1C: CPT | Performed by: FAMILY MEDICINE

## 2024-06-17 PROCEDURE — 3046F HEMOGLOBIN A1C LEVEL >9.0%: CPT | Performed by: FAMILY MEDICINE

## 2024-06-17 PROCEDURE — 3079F DIAST BP 80-89 MM HG: CPT | Performed by: FAMILY MEDICINE

## 2024-06-17 PROCEDURE — 1090F PRES/ABSN URINE INCON ASSESS: CPT | Performed by: FAMILY MEDICINE

## 2024-06-17 PROCEDURE — G8400 PT W/DXA NO RESULTS DOC: HCPCS | Performed by: FAMILY MEDICINE

## 2024-06-17 PROCEDURE — 3023F SPIROM DOC REV: CPT | Performed by: FAMILY MEDICINE

## 2024-06-17 PROCEDURE — G8417 CALC BMI ABV UP PARAM F/U: HCPCS | Performed by: FAMILY MEDICINE

## 2024-06-17 PROCEDURE — 4004F PT TOBACCO SCREEN RCVD TLK: CPT | Performed by: FAMILY MEDICINE

## 2024-06-17 PROCEDURE — 3017F COLORECTAL CA SCREEN DOC REV: CPT | Performed by: FAMILY MEDICINE

## 2024-06-17 PROCEDURE — 2022F DILAT RTA XM EVC RTNOPTHY: CPT | Performed by: FAMILY MEDICINE

## 2024-06-17 PROCEDURE — 1123F ACP DISCUSS/DSCN MKR DOCD: CPT | Performed by: FAMILY MEDICINE

## 2024-06-17 RX ORDER — DOXYCYCLINE HYCLATE 100 MG
100 TABLET ORAL 2 TIMES DAILY
COMMUNITY
Start: 2024-05-14 | End: 2024-06-17

## 2024-06-17 SDOH — HEALTH STABILITY: PHYSICAL HEALTH: ON AVERAGE, HOW MANY DAYS PER WEEK DO YOU ENGAGE IN MODERATE TO STRENUOUS EXERCISE (LIKE A BRISK WALK)?: 0 DAYS

## 2024-06-17 SDOH — HEALTH STABILITY: PHYSICAL HEALTH: ON AVERAGE, HOW MANY MINUTES DO YOU ENGAGE IN EXERCISE AT THIS LEVEL?: 0 MIN

## 2024-06-17 ASSESSMENT — PATIENT HEALTH QUESTIONNAIRE - PHQ9
4. FEELING TIRED OR HAVING LITTLE ENERGY: NOT AT ALL
SUM OF ALL RESPONSES TO PHQ9 QUESTIONS 1 & 2: 0
SUM OF ALL RESPONSES TO PHQ QUESTIONS 1-9: 0
SUM OF ALL RESPONSES TO PHQ9 QUESTIONS 1 & 2: 0
5. POOR APPETITE OR OVEREATING: NOT AT ALL
2. FEELING DOWN, DEPRESSED OR HOPELESS: NOT AT ALL
8. MOVING OR SPEAKING SO SLOWLY THAT OTHER PEOPLE COULD HAVE NOTICED. OR THE OPPOSITE, BEING SO FIGETY OR RESTLESS THAT YOU HAVE BEEN MOVING AROUND A LOT MORE THAN USUAL: NOT AT ALL
6. FEELING BAD ABOUT YOURSELF - OR THAT YOU ARE A FAILURE OR HAVE LET YOURSELF OR YOUR FAMILY DOWN: NOT AT ALL
SUM OF ALL RESPONSES TO PHQ QUESTIONS 1-9: 0
2. FEELING DOWN, DEPRESSED OR HOPELESS: NOT AT ALL
SUM OF ALL RESPONSES TO PHQ QUESTIONS 1-9: 0
9. THOUGHTS THAT YOU WOULD BE BETTER OFF DEAD, OR OF HURTING YOURSELF: NOT AT ALL
SUM OF ALL RESPONSES TO PHQ QUESTIONS 1-9: 0
7. TROUBLE CONCENTRATING ON THINGS, SUCH AS READING THE NEWSPAPER OR WATCHING TELEVISION: NOT AT ALL
SUM OF ALL RESPONSES TO PHQ QUESTIONS 1-9: 0
1. LITTLE INTEREST OR PLEASURE IN DOING THINGS: NOT AT ALL
3. TROUBLE FALLING OR STAYING ASLEEP: NOT AT ALL
1. LITTLE INTEREST OR PLEASURE IN DOING THINGS: NOT AT ALL
SUM OF ALL RESPONSES TO PHQ QUESTIONS 1-9: 0

## 2024-06-17 ASSESSMENT — LIFESTYLE VARIABLES
HOW OFTEN DO YOU HAVE A DRINK CONTAINING ALCOHOL: NEVER
HOW MANY STANDARD DRINKS CONTAINING ALCOHOL DO YOU HAVE ON A TYPICAL DAY: PATIENT DOES NOT DRINK
HOW OFTEN DO YOU HAVE A DRINK CONTAINING ALCOHOL: 1
HOW OFTEN DO YOU HAVE SIX OR MORE DRINKS ON ONE OCCASION: 1
HOW MANY STANDARD DRINKS CONTAINING ALCOHOL DO YOU HAVE ON A TYPICAL DAY: 0

## 2024-06-17 NOTE — PATIENT INSTRUCTIONS
in the bathroom with you.   Where can you learn more?  Go to https://[email or web address].net/patientEd and enter G117 to learn more about \"Preventing Falls: Care Instructions.\"  Current as of: July 17, 2023  Content Version: 14.1  © 5959-3908 [employer].   Care instructions adapted under license by [employer]. If you have questions about a medical condition or this instruction, always ask your healthcare professional. [employer] disclaims any warranty or liability for your use of this information.           Learning About Being Active as an Older Adult  Why is being active important as you get older?     Being active is one of the best things you can do for your health. And it's never too late to start. Being active--or getting active, if you aren't already--has definite benefits. It can:  Give you more energy,  Keep your mind sharp.  Improve balance to reduce your risk of falls.  Help you manage chronic illness with fewer medicines.  No matter how old you are, how fit you are, or what health problems you have, there is a form of activity that will work for you. And the more physical activity you can do, the better your overall health will be.  What kinds of activity can help you stay healthy?  Being more active will make your daily activities easier. Physical activity includes planned exercise and things you do in daily life. There are four types of activity:  Aerobic.  Doing aerobic activity makes your heart and lungs strong.  Includes walking, dancing, and gardening.  Aim for at least 2½ hours spread throughout the week.  It improves your energy and can help you sleep better.  Muscle-strengthening.  This type of activity can help maintain muscle and strengthen bones.  Includes climbing stairs, using resistance bands, and lifting or carrying heavy loads.  Aim for at least twice a week.  It can help protect the knees and other joints.  Stretching.  Stretching gives you better range of

## 2024-06-17 NOTE — PROGRESS NOTES
\"Have you been to the ER, urgent care clinic since your last visit?  Hospitalized since your last visit?\"    NO    “Have you seen or consulted any other health care providers outside of [facility] since your last visit?”    NO    Chief Complaint   Patient presents with    Medicare AWV     /82 (Site: Left Upper Arm, Position: Sitting, Cuff Size: Large Adult)   Pulse 74   Temp (!) 95.4 °F (35.2 °C) (Temporal)   Resp 18   Ht 1.626 m (5' 4\")   Wt 94.8 kg (209 lb)   BMI 35.87 kg/m²               Click Here for Release of Records Request    
reaction(s): Unknown (comments)    Erythromycin Base      Other reaction(s): Unknown (comments)    Ampicillin Rash     Other reaction(s): Unknown (comments)    Hydrocodone Nausea And Vomiting    Tuberculin Ppd Nausea And Vomiting     Prior to Visit Medications    Medication Sig Taking? Authorizing Provider   furosemide (LASIX) 40 MG tablet TAKE 1 TABLET BY MOUTH DAILY Yes Char Tucker DO   empagliflozin (JARDIANCE) 10 MG tablet TAKE 1 TABLET BY MOUTH DAILY Yes Char Tucker DO   rosuvastatin (CRESTOR) 40 MG tablet TAKE 1 TABLET BY MOUTH EVERY NIGHT FOR HIGH CHOLESTEROL Yes Char Tucker DO   levothyroxine (SYNTHROID) 100 MCG tablet TAKE 1 TABLET BY MOUTH DAILY BEFORE BREAKFAST FOR HYPOTHYROIDISM Yes Char Tucker DO   gabapentin (NEURONTIN) 300 MG capsule Take 1 capsule by mouth 3 times daily. Yes Provider, MD Zuleika   calcium carbonate (OSCAL) 500 MG TABS tablet Take 1 tablet by mouth daily Yes Provider, MD Zuleika   albuterol sulfate HFA (VENTOLIN HFA) 108 (90 Base) MCG/ACT inhaler Inhale 2 puffs into the lungs every 6 hours as needed for Wheezing Yes Provider, Historical, MD   SITagliptin (JANUVIA) 100 MG tablet Take 1 tablet by mouth daily Yes Char Tucker DO   venlafaxine (EFFEXOR XR) 150 MG extended release capsule TAKE 1 CAPSULE BY MOUTH DAILY Yes Char Tucker DO   metoprolol succinate (TOPROL XL) 50 MG extended release tablet TAKE 1 TABLET BY MOUTH DAILY Yes Char Tucker DO   amLODIPine (NORVASC) 10 MG tablet TAKE 1 TABLET BY MOUTH DAILY Yes Char Tucker DO   apixaban (ELIQUIS) 5 MG TABS tablet Take by mouth 2 times daily Yes Provider, MD Zuleika   acyclovir (ZOVIRAX) 400 MG tablet Take 1 tablet by mouth 2 times daily Yes Automatic Reconciliation, Ar   anastrozole (ARIMIDEX) 1 MG tablet Take 1 tablet by mouth daily Yes Automatic Reconciliation, Ar   vitamin D3 (CHOLECALCIFEROL) 125 MCG (5000 UT) TABS tablet Take 1 tablet by

## 2024-06-27 ENCOUNTER — HOSPITAL ENCOUNTER (OUTPATIENT)
Facility: HOSPITAL | Age: 74
Discharge: HOME | End: 2024-06-27
Attending: INTERNAL MEDICINE
Payer: MEDICARE

## 2024-06-27 DIAGNOSIS — Z85.3 PERSONAL HISTORY OF MALIGNANT NEOPLASM OF BREAST: ICD-10-CM

## 2024-06-27 PROCEDURE — 71250 CT THORAX DX C-: CPT

## 2024-07-01 PROBLEM — G47.9 SLEEP DISTURBANCE: Status: RESOLVED | Noted: 2023-10-02 | Resolved: 2024-07-01

## 2024-07-01 PROBLEM — I48.91 ATRIAL FIBRILLATION (HCC): Status: ACTIVE | Noted: 2024-07-01

## 2024-07-26 DIAGNOSIS — F41.1 GENERALIZED ANXIETY DISORDER: ICD-10-CM

## 2024-07-26 DIAGNOSIS — E11.22 TYPE 2 DIABETES MELLITUS WITH STAGE 3A CHRONIC KIDNEY DISEASE, WITHOUT LONG-TERM CURRENT USE OF INSULIN (HCC): ICD-10-CM

## 2024-07-26 DIAGNOSIS — N18.31 TYPE 2 DIABETES MELLITUS WITH STAGE 3A CHRONIC KIDNEY DISEASE, WITHOUT LONG-TERM CURRENT USE OF INSULIN (HCC): ICD-10-CM

## 2024-07-26 NOTE — TELEPHONE ENCOUNTER
Requested Prescriptions     Pending Prescriptions Disp Refills    JANUVIA 100 MG tablet [Pharmacy Med Name: JANUVIA 100MG TABLETS] 90 tablet 1     Sig: TAKE 1 TABLET BY MOUTH DAILY    venlafaxine (EFFEXOR XR) 150 MG extended release capsule [Pharmacy Med Name: VENLAFAXINE ER 150MG CAPSULES] 90 capsule 1     Sig: TAKE 1 CAPSULE BY MOUTH DAILY

## 2024-07-29 RX ORDER — SITAGLIPTIN 100 MG/1
100 TABLET, FILM COATED ORAL DAILY
Qty: 90 TABLET | Refills: 1 | Status: SHIPPED | OUTPATIENT
Start: 2024-07-29

## 2024-07-29 RX ORDER — VENLAFAXINE HYDROCHLORIDE 150 MG/1
150 CAPSULE, EXTENDED RELEASE ORAL DAILY
Qty: 90 CAPSULE | Refills: 1 | Status: SHIPPED | OUTPATIENT
Start: 2024-07-29

## 2024-08-26 NOTE — TELEPHONE ENCOUNTER
Requested Prescriptions     Pending Prescriptions Disp Refills    rosuvastatin (CRESTOR) 40 MG tablet [Pharmacy Med Name: ROSUVASTATIN 40MG TABLETS] 90 tablet 0     Sig: TAKE 1 TABLET BY MOUTH EVERY NIGHT FOR HIGH CHOLESTEROL    levothyroxine (SYNTHROID) 100 MCG tablet [Pharmacy Med Name: LEVOTHYROXINE 0.100MG (100MCG) TAB] 90 tablet 0     Sig: TAKE 1 TABLET BY MOUTH DAILY BEFORE BREAKFAST FOR HYPOTHYROIDISM

## 2024-08-29 RX ORDER — ROSUVASTATIN CALCIUM 40 MG/1
TABLET, COATED ORAL
Qty: 90 TABLET | Refills: 0 | Status: SHIPPED | OUTPATIENT
Start: 2024-08-29

## 2024-08-29 RX ORDER — LEVOTHYROXINE SODIUM 100 UG/1
TABLET ORAL
Qty: 90 TABLET | Refills: 0 | Status: SHIPPED | OUTPATIENT
Start: 2024-08-29

## 2024-09-09 RX ORDER — FUROSEMIDE 40 MG
40 TABLET ORAL DAILY
Qty: 90 TABLET | Refills: 1 | Status: SHIPPED | OUTPATIENT
Start: 2024-09-09

## 2024-09-28 DIAGNOSIS — I10 ESSENTIAL (PRIMARY) HYPERTENSION: ICD-10-CM

## 2024-09-30 NOTE — TELEPHONE ENCOUNTER
Requested Prescriptions     Pending Prescriptions Disp Refills    amLODIPine (NORVASC) 10 MG tablet [Pharmacy Med Name: AMLODIPINE BESYLATE 10MG TABLETS] 90 tablet 3     Sig: TAKE 1 TABLET BY MOUTH DAILY              Date of last OV:6/17/24  Future OV visit scheduled:  [x] Yes -> Date: 11/18/24  [] No    Last Refill: [] N/A  Date:10/10/23  Qty:90  # of refills:3    Med pending for provider review:  [x] Yes  [] No (provide reason why):     Requested Pharmacy updated in ENC:  [x] Yes

## 2024-10-02 RX ORDER — AMLODIPINE BESYLATE 10 MG/1
10 TABLET ORAL DAILY
Qty: 90 TABLET | Refills: 3 | Status: SHIPPED | OUTPATIENT
Start: 2024-10-02

## 2024-11-16 SDOH — ECONOMIC STABILITY: FOOD INSECURITY: WITHIN THE PAST 12 MONTHS, THE FOOD YOU BOUGHT JUST DIDN'T LAST AND YOU DIDN'T HAVE MONEY TO GET MORE.: NEVER TRUE

## 2024-11-16 SDOH — ECONOMIC STABILITY: FOOD INSECURITY: WITHIN THE PAST 12 MONTHS, YOU WORRIED THAT YOUR FOOD WOULD RUN OUT BEFORE YOU GOT MONEY TO BUY MORE.: NEVER TRUE

## 2024-11-16 SDOH — ECONOMIC STABILITY: INCOME INSECURITY: HOW HARD IS IT FOR YOU TO PAY FOR THE VERY BASICS LIKE FOOD, HOUSING, MEDICAL CARE, AND HEATING?: NOT HARD AT ALL

## 2024-11-18 ENCOUNTER — OFFICE VISIT (OUTPATIENT)
Facility: CLINIC | Age: 74
End: 2024-11-18
Payer: MEDICARE

## 2024-11-18 VITALS
HEIGHT: 60 IN | WEIGHT: 224 LBS | HEART RATE: 87 BPM | BODY MASS INDEX: 43.98 KG/M2 | SYSTOLIC BLOOD PRESSURE: 113 MMHG | RESPIRATION RATE: 16 BRPM | DIASTOLIC BLOOD PRESSURE: 84 MMHG | TEMPERATURE: 97.8 F

## 2024-11-18 DIAGNOSIS — N18.9 CHRONIC KIDNEY DISEASE, UNSPECIFIED CKD STAGE: ICD-10-CM

## 2024-11-18 DIAGNOSIS — E03.9 HYPOTHYROIDISM, UNSPECIFIED TYPE: ICD-10-CM

## 2024-11-18 DIAGNOSIS — C90.00 MULTIPLE MYELOMA, REMISSION STATUS UNSPECIFIED (HCC): ICD-10-CM

## 2024-11-18 DIAGNOSIS — E11.22 TYPE 2 DIABETES MELLITUS WITH STAGE 3A CHRONIC KIDNEY DISEASE, WITHOUT LONG-TERM CURRENT USE OF INSULIN (HCC): Primary | ICD-10-CM

## 2024-11-18 DIAGNOSIS — I50.22 CHRONIC SYSTOLIC (CONGESTIVE) HEART FAILURE (HCC): ICD-10-CM

## 2024-11-18 DIAGNOSIS — I48.91 ATRIAL FIBRILLATION, UNSPECIFIED TYPE (HCC): ICD-10-CM

## 2024-11-18 DIAGNOSIS — N18.31 TYPE 2 DIABETES MELLITUS WITH STAGE 3A CHRONIC KIDNEY DISEASE, WITHOUT LONG-TERM CURRENT USE OF INSULIN (HCC): Primary | ICD-10-CM

## 2024-11-18 DIAGNOSIS — Z11.59 ENCOUNTER FOR HEPATITIS C SCREENING TEST FOR LOW RISK PATIENT: ICD-10-CM

## 2024-11-18 DIAGNOSIS — J44.9 CHRONIC OBSTRUCTIVE PULMONARY DISEASE, UNSPECIFIED COPD TYPE (HCC): ICD-10-CM

## 2024-11-18 DIAGNOSIS — E78.5 HYPERLIPIDEMIA, UNSPECIFIED HYPERLIPIDEMIA TYPE: ICD-10-CM

## 2024-11-18 LAB — HBA1C MFR BLD: 6.3 %

## 2024-11-18 PROCEDURE — 99214 OFFICE O/P EST MOD 30 MIN: CPT | Performed by: FAMILY MEDICINE

## 2024-11-18 PROCEDURE — 3079F DIAST BP 80-89 MM HG: CPT | Performed by: FAMILY MEDICINE

## 2024-11-18 PROCEDURE — 1160F RVW MEDS BY RX/DR IN RCRD: CPT | Performed by: FAMILY MEDICINE

## 2024-11-18 PROCEDURE — 1090F PRES/ABSN URINE INCON ASSESS: CPT | Performed by: FAMILY MEDICINE

## 2024-11-18 PROCEDURE — 1159F MED LIST DOCD IN RCRD: CPT | Performed by: FAMILY MEDICINE

## 2024-11-18 PROCEDURE — 4004F PT TOBACCO SCREEN RCVD TLK: CPT | Performed by: FAMILY MEDICINE

## 2024-11-18 PROCEDURE — 83036 HEMOGLOBIN GLYCOSYLATED A1C: CPT | Performed by: FAMILY MEDICINE

## 2024-11-18 PROCEDURE — 3017F COLORECTAL CA SCREEN DOC REV: CPT | Performed by: FAMILY MEDICINE

## 2024-11-18 PROCEDURE — 3023F SPIROM DOC REV: CPT | Performed by: FAMILY MEDICINE

## 2024-11-18 PROCEDURE — G8427 DOCREV CUR MEDS BY ELIG CLIN: HCPCS | Performed by: FAMILY MEDICINE

## 2024-11-18 PROCEDURE — 1123F ACP DISCUSS/DSCN MKR DOCD: CPT | Performed by: FAMILY MEDICINE

## 2024-11-18 PROCEDURE — G8417 CALC BMI ABV UP PARAM F/U: HCPCS | Performed by: FAMILY MEDICINE

## 2024-11-18 PROCEDURE — 3074F SYST BP LT 130 MM HG: CPT | Performed by: FAMILY MEDICINE

## 2024-11-18 PROCEDURE — G8484 FLU IMMUNIZE NO ADMIN: HCPCS | Performed by: FAMILY MEDICINE

## 2024-11-18 PROCEDURE — 3046F HEMOGLOBIN A1C LEVEL >9.0%: CPT | Performed by: FAMILY MEDICINE

## 2024-11-18 PROCEDURE — 2022F DILAT RTA XM EVC RTNOPTHY: CPT | Performed by: FAMILY MEDICINE

## 2024-11-18 PROCEDURE — G8400 PT W/DXA NO RESULTS DOC: HCPCS | Performed by: FAMILY MEDICINE

## 2024-11-18 SDOH — ECONOMIC STABILITY: INCOME INSECURITY: HOW HARD IS IT FOR YOU TO PAY FOR THE VERY BASICS LIKE FOOD, HOUSING, MEDICAL CARE, AND HEATING?: PATIENT DECLINED

## 2024-11-18 SDOH — ECONOMIC STABILITY: FOOD INSECURITY: WITHIN THE PAST 12 MONTHS, YOU WORRIED THAT YOUR FOOD WOULD RUN OUT BEFORE YOU GOT MONEY TO BUY MORE.: PATIENT DECLINED

## 2024-11-18 SDOH — ECONOMIC STABILITY: FOOD INSECURITY: WITHIN THE PAST 12 MONTHS, THE FOOD YOU BOUGHT JUST DIDN'T LAST AND YOU DIDN'T HAVE MONEY TO GET MORE.: PATIENT DECLINED

## 2024-11-18 NOTE — PROGRESS NOTES
\"Have you been to the ER, urgent care clinic since your last visit?  Hospitalized since your last visit?\"    NO    “Have you seen or consulted any other health care providers outside our system since your last visit?”    NO      “Have you had a diabetic eye exam?”    no    No diabetic eye exam on file   Chief Complaint   Patient presents with    Follow-up     /84 (Site: Left Upper Arm, Position: Sitting, Cuff Size: Large Adult)   Pulse 87   Temp 97.8 °F (36.6 °C) (Temporal)   Resp 16   Ht 1.524 m (5')   Wt 101.6 kg (224 lb)   BMI 43.75 kg/m²

## 2024-11-18 NOTE — PROGRESS NOTES
Subjective  Chief Complaint   Patient presents with    Follow-up     HPI:  Mara Salinas is a 74 y.o. female with medical problems as listed below who presents for chronic conditions.     Multiple myeloma: Reports this is in remission. She completed chemotherapy. She still feels fatigued. Sees Dr. Gates q3-4 months.      DM2: Taking Jardiance, Januvia. Checks fasting glucose, typically 90's.      CKD: Last Cr in EMR at baseline. She is having blood work every 2-3 months through Oncology.      CHF/HTN/atrial fibrillation: Followed by [facility]. Last appointment a couple of weeks ago. Taking Eliquis. No recent changes to plan of management. BP readings going straight to Cardiology.      COPD: Followed by Dr. Abdi. She reports that she is supposed to get a CT scan soon.      Hypothyroidism: Last TSH was elevated, normal fT4 in 06/2024.     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

## 2024-11-19 LAB
CHOLEST SERPL-MCNC: 157 MG/DL (ref 100–199)
HCV IGG SERPL QL IA: NON REACTIVE
HDLC SERPL-MCNC: 61 MG/DL
LDLC SERPL CALC-MCNC: 77 MG/DL (ref 0–99)
TRIGL SERPL-MCNC: 103 MG/DL (ref 0–149)
TSH SERPL DL<=0.005 MIU/L-ACNC: 4.3 UIU/ML (ref 0.45–4.5)
VLDLC SERPL CALC-MCNC: 19 MG/DL (ref 5–40)

## 2024-11-19 NOTE — TELEPHONE ENCOUNTER
Requested Prescriptions     Pending Prescriptions Disp Refills    rosuvastatin (CRESTOR) 40 MG tablet [Pharmacy Med Name: ROSUVASTATIN 40MG TABLETS] 90 tablet 0     Sig: TAKE 1 TABLET BY MOUTH EVERY NIGHT FOR HIGH CHOLESTEROL    levothyroxine (SYNTHROID) 100 MCG tablet [Pharmacy Med Name: LEVOTHYROXINE 0.100MG (100MCG) TAB] 90 tablet 0     Sig: TAKE 1 TABLET BY MOUTH DAILY BEFORE BREAKFAST FOR HYPOTHYROIDISM     Date of last OV: 11/18/2024  Future OV visit scheduled:  [x] Yes -> Date: 06/20/2025  [] No    Last Refill: [] N/A both medications   Date: 08/29/2024  Qty:  90  # of refills: 0    Med pending for provider review:  [x] Yes  [] No (provide reason why):     Requested Pharmacy updated in ENC:  [] Yes    Applicable labs (provide date of completion):  [] Diabetic med- A1c:  [] Cholesterol med- Lipids:  [] BP med- CMP or BMP:   [] Thyroid med- TSH:  [] Gout med- Uric acid:  [] Prostate med- PSA:  [] Other (provide what type of med and lab):    Additional notes:

## 2024-11-20 LAB
ALBUMIN/CREAT UR: 85 MG/G CREAT (ref 0–29)
CREAT UR-MCNC: 10.7 MG/DL
MICROALBUMIN UR-MCNC: 9.1 UG/ML

## 2024-11-22 RX ORDER — ROSUVASTATIN CALCIUM 40 MG/1
TABLET, COATED ORAL
Qty: 90 TABLET | Refills: 3 | Status: SHIPPED | OUTPATIENT
Start: 2024-11-22

## 2024-11-22 RX ORDER — LEVOTHYROXINE SODIUM 100 UG/1
TABLET ORAL
Qty: 90 TABLET | Refills: 3 | Status: SHIPPED | OUTPATIENT
Start: 2024-11-22

## 2024-12-19 RX ORDER — FUROSEMIDE 40 MG/1
40 TABLET ORAL DAILY
Qty: 90 TABLET | Refills: 1 | Status: SHIPPED | OUTPATIENT
Start: 2024-12-19

## 2024-12-19 NOTE — TELEPHONE ENCOUNTER
Requested Prescriptions     Pending Prescriptions Disp Refills    furosemide (LASIX) 40 MG tablet [Pharmacy Med Name: FUROSEMIDE 40MG TABLETS] 90 tablet 1     Sig: TAKE 1 TABLET BY MOUTH DAILY          Date of last OV:11/18/24  Future OV visit scheduled:  [x] Yes -> Date: 6/20/25  [] No    Last Refill: [] N/A  Date:9/9/24  Qty:90  # of refills:1    Med pending for provider review:  [x] Yes  [] No (provide reason why):     Requested Pharmacy updated in ENC:  [x] Yes    Applicable labs (provide date of completion):  [] Diabetic med- A1c:  [] Cholesterol med- Lipids:  [] BP med- CMP or BMP:   [] Thyroid med- TSH:  [] Gout med- Uric acid:  [] Prostate med- PSA:  [] Other (provide what type of med and lab):    Additional notes:

## 2025-01-18 DIAGNOSIS — E11.22 TYPE 2 DIABETES MELLITUS WITH STAGE 3A CHRONIC KIDNEY DISEASE, WITHOUT LONG-TERM CURRENT USE OF INSULIN (HCC): ICD-10-CM

## 2025-01-18 DIAGNOSIS — N18.31 TYPE 2 DIABETES MELLITUS WITH STAGE 3A CHRONIC KIDNEY DISEASE, WITHOUT LONG-TERM CURRENT USE OF INSULIN (HCC): ICD-10-CM

## 2025-01-18 DIAGNOSIS — F41.1 GENERALIZED ANXIETY DISORDER: ICD-10-CM

## 2025-01-20 NOTE — TELEPHONE ENCOUNTER
Requested Prescriptions     Pending Prescriptions Disp Refills    JANUVIA 100 MG tablet [Pharmacy Med Name: JANUVIA 100MG TABLETS] 90 tablet 1     Sig: TAKE 1 TABLET BY MOUTH DAILY    venlafaxine (EFFEXOR XR) 150 MG extended release capsule [Pharmacy Med Name: VENLAFAXINE ER 150MG CAPSULES] 90 capsule 1     Sig: TAKE 1 CAPSULE BY MOUTH DAILY     Date of last OV:11/18/24  Future OV visit scheduled:  [x] Yes -> Date: 6/20/25  [] No    Last Refill: [] N/A  Date:7/29/24  Qty:90  # of refills:1    Med pending for provider review:  [x] Yes  [] No (provide reason why):     Requested Pharmacy updated in ENC:  [x] Yes    Applicable labs (provide date of completion):  [x] Diabetic med- A1c:  [x] Cholesterol med- Lipids:  [x] BP med- CMP or BMP:   [x] Thyroid med- TSH:  [] Gout med- Uric acid:  [] Prostate med- PSA:  [] Other (provide what type of med and lab):  11/18/24  Additional notes:

## 2025-01-21 RX ORDER — VENLAFAXINE HYDROCHLORIDE 150 MG/1
150 CAPSULE, EXTENDED RELEASE ORAL DAILY
Qty: 90 CAPSULE | Refills: 1 | Status: SHIPPED | OUTPATIENT
Start: 2025-01-21

## 2025-01-21 RX ORDER — SITAGLIPTIN 100 MG/1
100 TABLET, FILM COATED ORAL DAILY
Qty: 90 TABLET | Refills: 1 | Status: SHIPPED | OUTPATIENT
Start: 2025-01-21

## 2025-01-30 PROBLEM — Z86.0100 HISTORY OF COLONIC POLYPS: Status: ACTIVE | Noted: 2025-01-30

## 2025-01-30 PROBLEM — M17.12 ARTHRITIS OF LEFT KNEE: Status: RESOLVED | Noted: 2023-10-02 | Resolved: 2025-01-30

## 2025-01-30 PROBLEM — N18.9 CKD (CHRONIC KIDNEY DISEASE): Status: ACTIVE | Noted: 2025-01-30

## 2025-05-21 ENCOUNTER — HOSPITAL ENCOUNTER (OUTPATIENT)
Facility: HOSPITAL | Age: 75
Setting detail: SPECIMEN
Discharge: HOME | End: 2025-05-24

## 2025-05-21 LAB
BASOPHILS # BLD: 0.01 K/UL (ref 0–0.1)
BASOPHILS NFR BLD: 0.1 % (ref 0–1)
DIFFERENTIAL METHOD BLD: ABNORMAL
EOSINOPHIL # BLD: 0.13 K/UL (ref 0–0.4)
EOSINOPHIL NFR BLD: 1.6 % (ref 0–7)
ERYTHROCYTE [DISTWIDTH] IN BLOOD BY AUTOMATED COUNT: 15.3 % (ref 11.5–14.5)
HCT VFR BLD AUTO: 43.9 % (ref 35–47)
HGB BLD-MCNC: 13 G/DL (ref 11.5–16)
IMM GRANULOCYTES # BLD AUTO: 0.12 K/UL (ref 0–0.04)
IMM GRANULOCYTES NFR BLD AUTO: 1.5 % (ref 0–0.5)
LYMPHOCYTES # BLD: 0.7 K/UL (ref 0.8–3.5)
LYMPHOCYTES NFR BLD: 8.5 % (ref 12–49)
MCH RBC QN AUTO: 28.4 PG (ref 26–34)
MCHC RBC AUTO-ENTMCNC: 29.6 G/DL (ref 30–36.5)
MCV RBC AUTO: 95.9 FL (ref 80–99)
MONOCYTES # BLD: 1.05 K/UL (ref 0–1)
MONOCYTES NFR BLD: 12.7 % (ref 5–13)
NEUTS SEG # BLD: 6.23 K/UL (ref 1.8–8)
NEUTS SEG NFR BLD: 75.6 % (ref 32–75)
NRBC # BLD: 0 K/UL (ref 0–0.01)
NRBC BLD-RTO: 0 PER 100 WBC
PLATELET # BLD AUTO: 226 K/UL (ref 150–400)
PMV BLD AUTO: 11.6 FL (ref 8.9–12.9)
RBC # BLD AUTO: 4.58 M/UL (ref 3.8–5.2)
WBC # BLD AUTO: 8.2 K/UL (ref 3.6–11)

## 2025-05-21 PROCEDURE — 85025 COMPLETE CBC W/AUTO DIFF WBC: CPT

## 2025-05-22 NOTE — TELEPHONE ENCOUNTER
Requested Prescriptions     Pending Prescriptions Disp Refills    JARDIANCE 10 MG tablet [Pharmacy Med Name: JARDIANCE 10MG TABLETS] 90 tablet 3     Sig: TAKE 1 TABLET BY MOUTH DAILY

## 2025-05-29 RX ORDER — EMPAGLIFLOZIN 10 MG/1
10 TABLET, FILM COATED ORAL DAILY
Qty: 90 TABLET | Refills: 1 | Status: SHIPPED | OUTPATIENT
Start: 2025-05-29

## (undated) DEVICE — ARTHROSCOPY RICHMOND-LF: Brand: MEDLINE INDUSTRIES, INC.

## (undated) DEVICE — HANDLE LT SNAP ON ULT DURABLE LENS FOR TRUMPF ALC DISPOSABLE

## (undated) DEVICE — SPONGE GZ W4XL4IN COT 12 PLY TYP VII WVN C FLD DSGN

## (undated) DEVICE — PREP SKN CHLRAPRP APL 26ML STR --

## (undated) DEVICE — STERILE POLYISOPRENE POWDER-FREE SURGICAL GLOVES: Brand: PROTEXIS

## (undated) DEVICE — GARMENT,MEDLINE,DVT,INT,CALF,MED, GEN2: Brand: MEDLINE

## (undated) DEVICE — PAD,ABDOMINAL,5"X9",ST,LF,25/BX: Brand: MEDLINE INDUSTRIES, INC.

## (undated) DEVICE — SUTURE MCRYL SZ 4-0 L18IN ABSRB UD L16MM PC-3 3/8 CIR PRIM Y845G

## (undated) DEVICE — 4.5 MM INCISOR PLUS STRAIGHT                                    BLADES, POWER/EP-1, VIOLET, PACKAGED                                    6 PER BOX, STERILE

## (undated) DEVICE — MAT SUCT W36XL48IN FLD CTRL DISP

## (undated) DEVICE — 4.5 MM HELICUT STRAIGHT BURRS,                                    POWER/EP-1, SLATE, 5000 MAXIMUM RPM,                                    PACKAGED 6 PER BOX, STERILE: Brand: DYONICS HELICUT

## (undated) DEVICE — SOLUTION IRRIG 3000ML LAC R FLX CONT

## (undated) DEVICE — 4-PORT MANIFOLD: Brand: NEPTUNE 2

## (undated) DEVICE — SHOULDER SUSPENSION KIT 6 PER BOX

## (undated) DEVICE — SUTURE VCRL SZ 0 L27IN ABSRB UD L26MM CT-2 1/2 CIR J270H

## (undated) DEVICE — SUTURE FIBERTAPE FIBERWIRE SZ 2-0 30IN NONABSORB BLU AR72377

## (undated) DEVICE — SUTURE VCRL SZ 2-0 L27IN ABSRB UD L26MM SH 1/2 CIR J417H

## (undated) DEVICE — SUT ETHLN 4-0 18IN PS2 BLK --

## (undated) DEVICE — DERMABOND SKIN ADH 0.7ML -- DERMABOND ADVANCED 12/BX

## (undated) DEVICE — DRAPE,EXTREMITY,89X128,STERILE: Brand: MEDLINE

## (undated) DEVICE — KIT ENDOSCOPIC  PROC VIA

## (undated) DEVICE — CANNULA NSL O2 AD 7 FT END-TIDAL CARBON DIOX VENTFLO

## (undated) DEVICE — ROCKER SWITCH PENCIL BLADE ELECTRODE, HOLSTER: Brand: EDGE

## (undated) DEVICE — DYONICS 25 INFLOW/OUTFLOW TUBE                                    SET, SINGLE SUCTION, 3 PER BOX

## (undated) DEVICE — INFECTION CONTROL KIT SYS

## (undated) DEVICE — SUTURE TIGERTAPE TIGERWIRE SZ 2-0 L30IN NONABSORBABLE AR72377T

## (undated) DEVICE — DRAPE,REIN 53X77,STERILE: Brand: MEDLINE

## (undated) DEVICE — NDL SUT TAPR PT MAYO 0.5 CIR 5 --

## (undated) DEVICE — MOUTHPIECE ENDOSCP L CTRL OPN AND SIDE PORTS DISP

## (undated) DEVICE — VULCAN SCULPTOR-S 90 DEG 3.0MM: Brand: SCULPTOR

## (undated) DEVICE — SUTURE FIBERWIRE SZ 2 W/ TAPERED NEEDLE BLUE L38IN NONABSORB BLU L26.5MM 1/2 CIRCLE AR7200

## (undated) DEVICE — 3M™ STERI-DRAPE™ U-DRAPE 1015: Brand: STERI-DRAPE™